# Patient Record
Sex: FEMALE | Race: WHITE | NOT HISPANIC OR LATINO | Employment: OTHER | URBAN - METROPOLITAN AREA
[De-identification: names, ages, dates, MRNs, and addresses within clinical notes are randomized per-mention and may not be internally consistent; named-entity substitution may affect disease eponyms.]

---

## 2019-06-17 ENCOUNTER — OFFICE VISIT (OUTPATIENT)
Dept: URGENT CARE | Facility: CLINIC | Age: 84
End: 2019-06-17
Payer: MEDICARE

## 2019-06-17 VITALS
OXYGEN SATURATION: 92 % | HEIGHT: 63 IN | TEMPERATURE: 98.2 F | RESPIRATION RATE: 18 BRPM | BODY MASS INDEX: 33.13 KG/M2 | DIASTOLIC BLOOD PRESSURE: 70 MMHG | SYSTOLIC BLOOD PRESSURE: 130 MMHG | HEART RATE: 72 BPM | WEIGHT: 187 LBS

## 2019-06-17 DIAGNOSIS — L23.7 ALLERGIC DERMATITIS DUE TO POISON IVY: Primary | ICD-10-CM

## 2019-06-17 PROCEDURE — G0463 HOSPITAL OUTPT CLINIC VISIT: HCPCS | Performed by: NURSE PRACTITIONER

## 2019-06-17 PROCEDURE — 99213 OFFICE O/P EST LOW 20 MIN: CPT | Performed by: NURSE PRACTITIONER

## 2019-06-17 RX ORDER — SIMVASTATIN 40 MG
TABLET ORAL
COMMUNITY
Start: 2019-05-01

## 2019-06-17 RX ORDER — CHLORAL HYDRATE 500 MG
1000 CAPSULE ORAL DAILY
COMMUNITY

## 2019-06-17 RX ORDER — MULTIVIT WITH MINERALS/LUTEIN
1000 TABLET ORAL DAILY
COMMUNITY

## 2019-06-17 RX ORDER — ACETAMINOPHEN,DIPHENHYDRAMINE HCL 500; 25 MG/1; MG/1
1 TABLET, FILM COATED ORAL
COMMUNITY

## 2019-06-17 RX ORDER — MULTIVITAMIN
1 TABLET ORAL DAILY
COMMUNITY

## 2019-06-17 RX ORDER — LISINOPRIL AND HYDROCHLOROTHIAZIDE 20; 12.5 MG/1; MG/1
TABLET ORAL
COMMUNITY
Start: 2019-06-11

## 2019-06-17 RX ORDER — PREDNISONE 10 MG/1
TABLET ORAL
Qty: 18 TABLET | Refills: 0 | Status: SHIPPED | OUTPATIENT
Start: 2019-06-17

## 2019-06-17 RX ORDER — SERTRALINE HYDROCHLORIDE 25 MG/1
TABLET, FILM COATED ORAL
COMMUNITY
Start: 2019-04-30

## 2019-06-17 RX ORDER — LANOLIN ALCOHOL/MO/W.PET/CERES
1 CREAM (GRAM) TOPICAL 2 TIMES DAILY
COMMUNITY

## 2021-03-15 ENCOUNTER — IMMUNIZATIONS (OUTPATIENT)
Dept: FAMILY MEDICINE CLINIC | Facility: HOSPITAL | Age: 86
End: 2021-03-15

## 2021-03-15 DIAGNOSIS — Z23 ENCOUNTER FOR IMMUNIZATION: Primary | ICD-10-CM

## 2021-03-15 PROCEDURE — 91300 SARS-COV-2 / COVID-19 MRNA VACCINE (PFIZER-BIONTECH) 30 MCG: CPT

## 2021-03-15 PROCEDURE — 0001A SARS-COV-2 / COVID-19 MRNA VACCINE (PFIZER-BIONTECH) 30 MCG: CPT

## 2021-04-08 ENCOUNTER — IMMUNIZATIONS (OUTPATIENT)
Dept: FAMILY MEDICINE CLINIC | Facility: HOSPITAL | Age: 86
End: 2021-04-08

## 2021-04-08 DIAGNOSIS — Z23 ENCOUNTER FOR IMMUNIZATION: Primary | ICD-10-CM

## 2021-04-08 PROCEDURE — 0002A SARS-COV-2 / COVID-19 MRNA VACCINE (PFIZER-BIONTECH) 30 MCG: CPT

## 2021-04-08 PROCEDURE — 91300 SARS-COV-2 / COVID-19 MRNA VACCINE (PFIZER-BIONTECH) 30 MCG: CPT

## 2024-01-01 ENCOUNTER — HOSPITAL ENCOUNTER (INPATIENT)
Facility: HOSPITAL | Age: 89
LOS: 7 days | Discharge: HOME HEALTH | DRG: 871 | End: 2024-01-08
Attending: EMERGENCY MEDICINE | Admitting: INTERNAL MEDICINE
Payer: MEDICARE

## 2024-01-01 ENCOUNTER — APPOINTMENT (EMERGENCY)
Dept: RADIOLOGY | Facility: HOSPITAL | Age: 89
DRG: 871 | End: 2024-01-01
Payer: MEDICARE

## 2024-01-01 ENCOUNTER — APPOINTMENT (INPATIENT)
Dept: CT IMAGING | Facility: HOSPITAL | Age: 89
DRG: 871 | End: 2024-01-01
Payer: MEDICARE

## 2024-01-01 DIAGNOSIS — J18.9 PNEUMONIA: Primary | ICD-10-CM

## 2024-01-01 DIAGNOSIS — J96.01 ACUTE HYPOXIC RESPIRATORY FAILURE (HCC): ICD-10-CM

## 2024-01-01 DIAGNOSIS — R09.02 HYPOXIA: ICD-10-CM

## 2024-01-01 DIAGNOSIS — J96.90 RESPIRATORY FAILURE (HCC): ICD-10-CM

## 2024-01-01 PROBLEM — R79.89 ELEVATED D-DIMER: Status: ACTIVE | Noted: 2024-01-01

## 2024-01-01 PROBLEM — A41.9 SEPSIS DUE TO PNEUMONIA (HCC): Status: ACTIVE | Noted: 2024-01-01

## 2024-01-01 PROBLEM — E87.1 HYPONATREMIA: Status: ACTIVE | Noted: 2024-01-01

## 2024-01-01 LAB
ALBUMIN SERPL BCP-MCNC: 4 G/DL (ref 3.5–5)
ALP SERPL-CCNC: 61 U/L (ref 34–104)
ALT SERPL W P-5'-P-CCNC: 23 U/L (ref 7–52)
ANION GAP SERPL CALCULATED.3IONS-SCNC: 9 MMOL/L
APTT PPP: 29 SECONDS (ref 23–37)
AST SERPL W P-5'-P-CCNC: 29 U/L (ref 13–39)
BASE EX.OXY STD BLDV CALC-SCNC: 93.9 % (ref 60–80)
BASE EXCESS BLDV CALC-SCNC: 2.4 MMOL/L
BASOPHILS # BLD AUTO: 0.04 THOUSANDS/ÂΜL (ref 0–0.1)
BASOPHILS NFR BLD AUTO: 0 % (ref 0–1)
BILIRUB SERPL-MCNC: 0.57 MG/DL (ref 0.2–1)
BUN SERPL-MCNC: 24 MG/DL (ref 5–25)
CALCIUM SERPL-MCNC: 9.2 MG/DL (ref 8.4–10.2)
CHLORIDE SERPL-SCNC: 85 MMOL/L (ref 96–108)
CO2 SERPL-SCNC: 27 MMOL/L (ref 21–32)
CREAT SERPL-MCNC: 1.11 MG/DL (ref 0.6–1.3)
D DIMER PPP FEU-MCNC: 2.98 UG/ML FEU
EOSINOPHIL # BLD AUTO: 0.03 THOUSAND/ÂΜL (ref 0–0.61)
EOSINOPHIL NFR BLD AUTO: 0 % (ref 0–6)
ERYTHROCYTE [DISTWIDTH] IN BLOOD BY AUTOMATED COUNT: 13.1 % (ref 11.6–15.1)
FLUAV RNA RESP QL NAA+PROBE: NEGATIVE
FLUBV RNA RESP QL NAA+PROBE: NEGATIVE
GFR SERPL CREATININE-BSD FRML MDRD: 43 ML/MIN/1.73SQ M
GLUCOSE SERPL-MCNC: 152 MG/DL (ref 65–140)
GLUCOSE SERPL-MCNC: 261 MG/DL (ref 65–140)
HCO3 BLDV-SCNC: 27.3 MMOL/L (ref 24–30)
HCT VFR BLD AUTO: 41.6 % (ref 34.8–46.1)
HGB BLD-MCNC: 13.7 G/DL (ref 11.5–15.4)
IMM GRANULOCYTES # BLD AUTO: 0.06 THOUSAND/UL (ref 0–0.2)
IMM GRANULOCYTES NFR BLD AUTO: 0 % (ref 0–2)
INR PPP: 1.06 (ref 0.84–1.19)
LACTATE SERPL-SCNC: 1.8 MMOL/L (ref 0.5–2)
LYMPHOCYTES # BLD AUTO: 1.22 THOUSANDS/ÂΜL (ref 0.6–4.47)
LYMPHOCYTES NFR BLD AUTO: 7 % (ref 14–44)
MAGNESIUM SERPL-MCNC: 1.8 MG/DL (ref 1.9–2.7)
MCH RBC QN AUTO: 30.3 PG (ref 26.8–34.3)
MCHC RBC AUTO-ENTMCNC: 32.9 G/DL (ref 31.4–37.4)
MCV RBC AUTO: 92 FL (ref 82–98)
MONOCYTES # BLD AUTO: 1.18 THOUSAND/ÂΜL (ref 0.17–1.22)
MONOCYTES NFR BLD AUTO: 7 % (ref 4–12)
NEUTROPHILS # BLD AUTO: 14.5 THOUSANDS/ÂΜL (ref 1.85–7.62)
NEUTS SEG NFR BLD AUTO: 86 % (ref 43–75)
NRBC BLD AUTO-RTO: 0 /100 WBCS
O2 CT BLDV-SCNC: 19.3 ML/DL
PCO2 BLDV: 43.5 MM HG (ref 42–50)
PH BLDV: 7.42 [PH] (ref 7.3–7.4)
PLATELET # BLD AUTO: 258 THOUSANDS/UL (ref 149–390)
PMV BLD AUTO: 9.3 FL (ref 8.9–12.7)
PO2 BLDV: 79.4 MM HG (ref 35–45)
POTASSIUM SERPL-SCNC: 3.7 MMOL/L (ref 3.5–5.3)
PROCALCITONIN SERPL-MCNC: 0.11 NG/ML
PROT SERPL-MCNC: 7.5 G/DL (ref 6.4–8.4)
PROTHROMBIN TIME: 13.9 SECONDS (ref 11.6–14.5)
RBC # BLD AUTO: 4.52 MILLION/UL (ref 3.81–5.12)
RSV RNA RESP QL NAA+PROBE: NEGATIVE
SARS-COV-2 RNA RESP QL NAA+PROBE: NEGATIVE
SODIUM SERPL-SCNC: 121 MMOL/L (ref 135–147)
WBC # BLD AUTO: 17.03 THOUSAND/UL (ref 4.31–10.16)

## 2024-01-01 PROCEDURE — 94762 N-INVAS EAR/PLS OXIMTRY CONT: CPT

## 2024-01-01 PROCEDURE — 71045 X-RAY EXAM CHEST 1 VIEW: CPT

## 2024-01-01 PROCEDURE — 80053 COMPREHEN METABOLIC PANEL: CPT | Performed by: EMERGENCY MEDICINE

## 2024-01-01 PROCEDURE — 87040 BLOOD CULTURE FOR BACTERIA: CPT | Performed by: EMERGENCY MEDICINE

## 2024-01-01 PROCEDURE — 5A1945Z RESPIRATORY VENTILATION, 24-96 CONSECUTIVE HOURS: ICD-10-PCS | Performed by: STUDENT IN AN ORGANIZED HEALTH CARE EDUCATION/TRAINING PROGRAM

## 2024-01-01 PROCEDURE — 71275 CT ANGIOGRAPHY CHEST: CPT

## 2024-01-01 PROCEDURE — 83735 ASSAY OF MAGNESIUM: CPT | Performed by: EMERGENCY MEDICINE

## 2024-01-01 PROCEDURE — 83036 HEMOGLOBIN GLYCOSYLATED A1C: CPT | Performed by: PHYSICIAN ASSISTANT

## 2024-01-01 PROCEDURE — 94664 DEMO&/EVAL PT USE INHALER: CPT

## 2024-01-01 PROCEDURE — 96365 THER/PROPH/DIAG IV INF INIT: CPT

## 2024-01-01 PROCEDURE — 96375 TX/PRO/DX INJ NEW DRUG ADDON: CPT

## 2024-01-01 PROCEDURE — 82948 REAGENT STRIP/BLOOD GLUCOSE: CPT

## 2024-01-01 PROCEDURE — 83605 ASSAY OF LACTIC ACID: CPT | Performed by: EMERGENCY MEDICINE

## 2024-01-01 PROCEDURE — 99291 CRITICAL CARE FIRST HOUR: CPT | Performed by: EMERGENCY MEDICINE

## 2024-01-01 PROCEDURE — 94760 N-INVAS EAR/PLS OXIMETRY 1: CPT

## 2024-01-01 PROCEDURE — 85610 PROTHROMBIN TIME: CPT | Performed by: EMERGENCY MEDICINE

## 2024-01-01 PROCEDURE — 36415 COLL VENOUS BLD VENIPUNCTURE: CPT | Performed by: EMERGENCY MEDICINE

## 2024-01-01 PROCEDURE — 0241U HB NFCT DS VIR RESP RNA 4 TRGT: CPT | Performed by: EMERGENCY MEDICINE

## 2024-01-01 PROCEDURE — 94644 CONT INHLJ TX 1ST HOUR: CPT

## 2024-01-01 PROCEDURE — 82805 BLOOD GASES W/O2 SATURATION: CPT | Performed by: EMERGENCY MEDICINE

## 2024-01-01 PROCEDURE — 99291 CRITICAL CARE FIRST HOUR: CPT

## 2024-01-01 PROCEDURE — 0BH17EZ INSERTION OF ENDOTRACHEAL AIRWAY INTO TRACHEA, VIA NATURAL OR ARTIFICIAL OPENING: ICD-10-PCS | Performed by: STUDENT IN AN ORGANIZED HEALTH CARE EDUCATION/TRAINING PROGRAM

## 2024-01-01 PROCEDURE — 84145 PROCALCITONIN (PCT): CPT | Performed by: EMERGENCY MEDICINE

## 2024-01-01 PROCEDURE — 99223 1ST HOSP IP/OBS HIGH 75: CPT | Performed by: PHYSICIAN ASSISTANT

## 2024-01-01 PROCEDURE — 85025 COMPLETE CBC W/AUTO DIFF WBC: CPT | Performed by: EMERGENCY MEDICINE

## 2024-01-01 PROCEDURE — 96367 TX/PROPH/DG ADDL SEQ IV INF: CPT

## 2024-01-01 PROCEDURE — NC001 PR NO CHARGE: Performed by: NURSE PRACTITIONER

## 2024-01-01 PROCEDURE — 94640 AIRWAY INHALATION TREATMENT: CPT

## 2024-01-01 PROCEDURE — 85730 THROMBOPLASTIN TIME PARTIAL: CPT | Performed by: EMERGENCY MEDICINE

## 2024-01-01 PROCEDURE — G1004 CDSM NDSC: HCPCS

## 2024-01-01 PROCEDURE — 85379 FIBRIN DEGRADATION QUANT: CPT | Performed by: EMERGENCY MEDICINE

## 2024-01-01 RX ORDER — INSULIN LISPRO 100 [IU]/ML
1-6 INJECTION, SOLUTION INTRAVENOUS; SUBCUTANEOUS
Status: DISCONTINUED | OUTPATIENT
Start: 2024-01-01 | End: 2024-01-02

## 2024-01-01 RX ORDER — LEVALBUTEROL INHALATION SOLUTION 1.25 MG/3ML
1.25 SOLUTION RESPIRATORY (INHALATION)
Status: DISCONTINUED | OUTPATIENT
Start: 2024-01-01 | End: 2024-01-08 | Stop reason: HOSPADM

## 2024-01-01 RX ORDER — HEPARIN SODIUM 5000 [USP'U]/ML
5000 INJECTION, SOLUTION INTRAVENOUS; SUBCUTANEOUS EVERY 8 HOURS SCHEDULED
Status: DISCONTINUED | OUTPATIENT
Start: 2024-01-01 | End: 2024-01-08 | Stop reason: HOSPADM

## 2024-01-01 RX ORDER — CEFTRIAXONE 1 G/50ML
1000 INJECTION, SOLUTION INTRAVENOUS EVERY 24 HOURS
Status: DISCONTINUED | OUTPATIENT
Start: 2024-01-02 | End: 2024-01-02

## 2024-01-01 RX ORDER — SODIUM CHLORIDE FOR INHALATION 0.9 %
12 VIAL, NEBULIZER (ML) INHALATION ONCE
Status: COMPLETED | OUTPATIENT
Start: 2024-01-01 | End: 2024-01-01

## 2024-01-01 RX ORDER — METHYLPREDNISOLONE SODIUM SUCCINATE 125 MG/2ML
125 INJECTION, POWDER, LYOPHILIZED, FOR SOLUTION INTRAMUSCULAR; INTRAVENOUS ONCE
Status: COMPLETED | OUTPATIENT
Start: 2024-01-01 | End: 2024-01-01

## 2024-01-01 RX ORDER — PRAVASTATIN SODIUM 40 MG
80 TABLET ORAL
Status: DISCONTINUED | OUTPATIENT
Start: 2024-01-01 | End: 2024-01-02

## 2024-01-01 RX ORDER — METHYLPREDNISOLONE SODIUM SUCCINATE 40 MG/ML
40 INJECTION, POWDER, LYOPHILIZED, FOR SOLUTION INTRAMUSCULAR; INTRAVENOUS EVERY 8 HOURS SCHEDULED
Status: DISCONTINUED | OUTPATIENT
Start: 2024-01-01 | End: 2024-01-02

## 2024-01-01 RX ORDER — ONDANSETRON 2 MG/ML
4 INJECTION INTRAMUSCULAR; INTRAVENOUS EVERY 6 HOURS PRN
Status: DISCONTINUED | OUTPATIENT
Start: 2024-01-01 | End: 2024-01-08 | Stop reason: HOSPADM

## 2024-01-01 RX ORDER — GUAIFENESIN/DEXTROMETHORPHAN 100-10MG/5
10 SYRUP ORAL EVERY 4 HOURS PRN
Status: DISCONTINUED | OUTPATIENT
Start: 2024-01-01 | End: 2024-01-07

## 2024-01-01 RX ORDER — SODIUM CHLORIDE 9 MG/ML
150 INJECTION, SOLUTION INTRAVENOUS CONTINUOUS
Status: DISCONTINUED | OUTPATIENT
Start: 2024-01-01 | End: 2024-01-02

## 2024-01-01 RX ORDER — HYDROCHLOROTHIAZIDE 12.5 MG/1
12.5 TABLET ORAL DAILY
Status: DISCONTINUED | OUTPATIENT
Start: 2024-01-02 | End: 2024-01-02

## 2024-01-01 RX ORDER — LISINOPRIL 20 MG/1
20 TABLET ORAL DAILY
Status: DISCONTINUED | OUTPATIENT
Start: 2024-01-02 | End: 2024-01-02

## 2024-01-01 RX ORDER — CHLORAL HYDRATE 500 MG
1000 CAPSULE ORAL DAILY
Status: DISCONTINUED | OUTPATIENT
Start: 2024-01-02 | End: 2024-01-02

## 2024-01-01 RX ORDER — INSULIN LISPRO 100 [IU]/ML
1-6 INJECTION, SOLUTION INTRAVENOUS; SUBCUTANEOUS
Status: DISCONTINUED | OUTPATIENT
Start: 2024-01-02 | End: 2024-01-02

## 2024-01-01 RX ORDER — ACETAMINOPHEN 325 MG/1
650 TABLET ORAL EVERY 6 HOURS PRN
Status: DISCONTINUED | OUTPATIENT
Start: 2024-01-01 | End: 2024-01-08 | Stop reason: HOSPADM

## 2024-01-01 RX ORDER — GUAIFENESIN 600 MG/1
600 TABLET, EXTENDED RELEASE ORAL EVERY 12 HOURS SCHEDULED
Status: DISCONTINUED | OUTPATIENT
Start: 2024-01-01 | End: 2024-01-02

## 2024-01-01 RX ORDER — CEFTRIAXONE 1 G/50ML
1000 INJECTION, SOLUTION INTRAVENOUS ONCE
Status: COMPLETED | OUTPATIENT
Start: 2024-01-01 | End: 2024-01-01

## 2024-01-01 RX ADMIN — METHYLPREDNISOLONE SODIUM SUCCINATE 40 MG: 40 INJECTION, POWDER, FOR SOLUTION INTRAMUSCULAR; INTRAVENOUS at 22:27

## 2024-01-01 RX ADMIN — GUAIFENESIN 600 MG: 600 TABLET ORAL at 22:51

## 2024-01-01 RX ADMIN — HEPARIN SODIUM 5000 UNITS: 5000 INJECTION INTRAVENOUS; SUBCUTANEOUS at 22:13

## 2024-01-01 RX ADMIN — IOHEXOL 85 ML: 350 INJECTION, SOLUTION INTRAVENOUS at 22:01

## 2024-01-01 RX ADMIN — LEVALBUTEROL HYDROCHLORIDE 1.25 MG: 1.25 SOLUTION RESPIRATORY (INHALATION) at 22:55

## 2024-01-01 RX ADMIN — IPRATROPIUM BROMIDE 1 MG: 0.5 SOLUTION RESPIRATORY (INHALATION) at 16:45

## 2024-01-01 RX ADMIN — GUAIFENESIN AND DEXTROMETHORPHAN 10 ML: 100; 10 SYRUP ORAL at 22:37

## 2024-01-01 RX ADMIN — SODIUM CHLORIDE 150 ML/HR: 0.9 INJECTION, SOLUTION INTRAVENOUS at 20:46

## 2024-01-01 RX ADMIN — IPRATROPIUM BROMIDE 0.5 MG: 0.5 SOLUTION RESPIRATORY (INHALATION) at 22:55

## 2024-01-01 RX ADMIN — AZITHROMYCIN MONOHYDRATE 500 MG: 500 INJECTION, POWDER, LYOPHILIZED, FOR SOLUTION INTRAVENOUS at 17:45

## 2024-01-01 RX ADMIN — INSULIN LISPRO 3 UNITS: 100 INJECTION, SOLUTION INTRAVENOUS; SUBCUTANEOUS at 22:27

## 2024-01-01 RX ADMIN — CEFTRIAXONE 1000 MG: 1 INJECTION, SOLUTION INTRAVENOUS at 17:20

## 2024-01-01 RX ADMIN — Medication 12 ML: at 16:45

## 2024-01-01 RX ADMIN — METHYLPREDNISOLONE SODIUM SUCCINATE 125 MG: 125 INJECTION, POWDER, FOR SOLUTION INTRAMUSCULAR; INTRAVENOUS at 16:42

## 2024-01-01 RX ADMIN — ALBUTEROL SULFATE 10 MG: 2.5 SOLUTION RESPIRATORY (INHALATION) at 16:45

## 2024-01-01 RX ADMIN — SODIUM CHLORIDE 1000 ML: 0.9 INJECTION, SOLUTION INTRAVENOUS at 17:12

## 2024-01-01 NOTE — ED PROVIDER NOTES
"History  Chief Complaint   Patient presents with    Shortness of Breath     Pt reports sob since tuesday 89F with longstanding smoking history presents with cough, wheezing over the last several days however getting worse.  Nonproductive however wheezing and harsh sounding.  History also obtained by daughter.  Patient hypoxic on room air, placed on nasal cannula.  She states she has been smoking \"a long time\" presently 8 cigarettes a day however denies any history of COPD or asthma.      Shortness of Breath      Prior to Admission Medications   Prescriptions Last Dose Informant Patient Reported? Taking?   Multiple Vitamin (MULTIVITAMIN) tablet 1/1/2024  Yes Yes   Sig: Take 1 tablet by mouth daily   Omega-3 Fatty Acids (FISH OIL) 1,000 mg 1/1/2024 Self Yes Yes   Sig: Take 1,000 mg by mouth daily   calcium citrate-vitamin D (CITRACAL+D) 315-200 MG-UNIT per tablet 1/1/2024  Yes Yes   Sig: Take 1 tablet by mouth 2 (two) times a day   diphenhydrAMINE-acetaminophen (TYLENOL PM)  MG TABS  Self Yes No   Sig: Take 1 tablet by mouth daily at bedtime as needed for sleep   lisinopril-hydrochlorothiazide (PRINZIDE,ZESTORETIC) 20-12.5 MG per tablet 1/1/2024  Yes Yes   predniSONE 10 mg tablet Not Taking  No No   Sig: 3 tabs daily for 3 days, 2 tabs daily for 3 days, 1 tab daily for 3 days   Patient not taking: Reported on 1/1/2024   sertraline (ZOLOFT) 25 mg tablet Not Taking  Yes No   Patient not taking: Reported on 1/1/2024   simvastatin (ZOCOR) 40 mg tablet 1/1/2024  Yes Yes   vitamin E, tocopherol, 1,000 units capsule 1/1/2024  Yes Yes   Sig: Take 1,000 Units by mouth daily      Facility-Administered Medications: None       Past Medical History:   Diagnosis Date    Hyperlipidemia     Hypertension        Past Surgical History:   Procedure Laterality Date    HYSTERECTOMY  1974       History reviewed. No pertinent family history.  I have reviewed and agree with the history as documented.    E-Cigarette/Vaping "     E-Cigarette/Vaping Substances     Social History     Tobacco Use    Smoking status: Every Day    Smokeless tobacco: Never   Substance Use Topics    Alcohol use: Not Currently    Drug use: Never       Review of Systems   Respiratory:  Positive for shortness of breath.        Physical Exam  Physical Exam  Vitals and nursing note reviewed.   Constitutional:       Appearance: Normal appearance. She is well-developed.   HENT:      Head: Normocephalic and atraumatic.   Eyes:      Conjunctiva/sclera: Conjunctivae normal.      Pupils: Pupils are equal, round, and reactive to light.   Neck:      Trachea: No tracheal deviation.   Cardiovascular:      Rate and Rhythm: Normal rate and regular rhythm.      Heart sounds: Normal heart sounds. No murmur heard.  Pulmonary:      Effort: Tachypnea and respiratory distress present.      Breath sounds: Wheezing and rhonchi present. No rales.   Abdominal:      General: Bowel sounds are normal. There is no distension.      Palpations: Abdomen is soft.      Tenderness: There is no abdominal tenderness.   Musculoskeletal:         General: No deformity.      Cervical back: Normal range of motion and neck supple.   Skin:     General: Skin is warm and dry.      Capillary Refill: Capillary refill takes less than 2 seconds.   Neurological:      General: No focal deficit present.      Mental Status: She is alert and oriented to person, place, and time.      Sensory: No sensory deficit.   Psychiatric:         Mood and Affect: Mood normal.         Judgment: Judgment normal.         Vital Signs  ED Triage Vitals   Temperature Pulse Respirations Blood Pressure SpO2   01/01/24 1601 01/01/24 1601 01/01/24 1601 01/01/24 1630 01/01/24 1601   98.8 °F (37.1 °C) 83 (!) 26 153/68 (!) 89 %      Temp Source Heart Rate Source Patient Position - Orthostatic VS BP Location FiO2 (%)   01/01/24 1601 01/01/24 1601 01/01/24 1630 01/01/24 1630 --   Temporal Monitor Sitting Left arm       Pain Score       01/01/24  1601       No Pain           Vitals:    01/01/24 2100 01/01/24 2212 01/01/24 2216 01/01/24 2245   BP: 122/72 (!) 175/118 (!) 167/131    Pulse: (!) 111 (!) 118 (!) 117 (!) 116   Patient Position - Orthostatic VS: Sitting            Visual Acuity      ED Medications  Medications   lisinopril (ZESTRIL) tablet 20 mg (has no administration in time range)     And   hydrochlorothiazide (HYDRODIURIL) tablet 12.5 mg (has no administration in time range)   multivitamin stress formula tablet 1 tablet (has no administration in time range)   fish oil capsule 1,000 mg (has no administration in time range)   pravastatin (PRAVACHOL) tablet 80 mg (0 mg Oral Hold 1/1/24 2045)   vitamin E (TOCOPHEROL) capsule 800 Units (has no administration in time range)   sodium chloride 0.9 % infusion (150 mL/hr Intravenous New Bag 1/1/24 2046)   acetaminophen (TYLENOL) tablet 650 mg (has no administration in time range)   ondansetron (ZOFRAN) injection 4 mg (has no administration in time range)   dextromethorphan-guaiFENesin (ROBITUSSIN DM) oral syrup 10 mL (10 mL Oral Given 1/1/24 2237)   heparin (porcine) subcutaneous injection 5,000 Units (5,000 Units Subcutaneous Given 1/1/24 2213)   insulin lispro (HumaLOG) 100 units/mL subcutaneous injection 1-6 Units (has no administration in time range)   insulin lispro (HumaLOG) 100 units/mL subcutaneous injection 1-6 Units (3 Units Subcutaneous Given 1/1/24 2227)   methylPREDNISolone sodium succinate (Solu-MEDROL) injection 40 mg (40 mg Intravenous Given 1/1/24 2227)   guaiFENesin (MUCINEX) 12 hr tablet 600 mg (600 mg Oral Given 1/1/24 2251)   levalbuterol (XOPENEX) inhalation solution 1.25 mg (1.25 mg Nebulization Given 1/1/24 2255)   ipratropium (ATROVENT) 0.02 % inhalation solution 0.5 mg (0.5 mg Nebulization Given 1/1/24 2255)   cefTRIAXone (ROCEPHIN) IVPB (premix in dextrose) 1,000 mg 50 mL (has no administration in time range)   albuterol inhalation solution 10 mg (10 mg Nebulization Given 1/1/24  1645)   ipratropium (ATROVENT) 0.02 % inhalation solution 1 mg (1 mg Nebulization Given 1/1/24 1645)   sodium chloride 0.9 % inhalation solution 12 mL (12 mL Nebulization Given 1/1/24 1645)   methylPREDNISolone sodium succinate (Solu-MEDROL) injection 125 mg (125 mg Intravenous Given 1/1/24 1642)   cefTRIAXone (ROCEPHIN) IVPB (premix in dextrose) 1,000 mg 50 mL (0 mg Intravenous Stopped 1/1/24 1750)   azithromycin (ZITHROMAX) 500 mg in sodium chloride 0.9 % 250 mL IVPB (0 mg Intravenous Stopped 1/1/24 1845)   sodium chloride 0.9 % bolus 1,000 mL (0 mL Intravenous Stopped 1/1/24 1912)   iohexol (OMNIPAQUE) 350 MG/ML injection (MULTI-DOSE) 85 mL (85 mL Intravenous Given 1/1/24 2201)       Diagnostic Studies  Results Reviewed       Procedure Component Value Units Date/Time    Hemoglobin A1c w/EAG Estimation (Orders if not completed within the last 90 days) [[id number]] Collected: 01/01/24 1643    Lab Status: In process Specimen: Blood from Arm, Right Updated: 01/01/24 2010    D-dimer, quantitative [[id number]]  (Abnormal) Collected: 01/01/24 1643    Lab Status: Final result Specimen: Blood from Arm, Right Updated: 01/01/24 1945     D-Dimer, Quant 2.98 ug/ml FEU     Narrative:      In the evaluation for possible pulmonary embolism, in the appropriate (Well's Score of 4 or less) patient, the age adjusted d-dimer cutoff for this patient can be calculated as:    Age x 0.01 (in ug/mL) for Age-adjusted D-dimer exclusion threshold for a patient over 50 years.    Legionella antigen, Urine [[id number]]     Lab Status: No result Specimen: Urine     Strep Pneumoniae, Urine [[id number]]     Lab Status: No result Specimen: Urine     Procalcitonin [[id number]]  (Normal) Collected: 01/01/24 1719    Lab Status: Final result Specimen: Blood from Arm, Right Updated: 01/01/24 1759     Procalcitonin 0.11 ng/ml     Lactic acid [[id number]]  (Normal) Collected: 01/01/24 1719    Lab Status: Final result Specimen: Blood from Arm, Right Updated:  01/01/24 1748     LACTIC ACID 1.8 mmol/L     Narrative:      Result may be elevated if tourniquet was used during collection.    Blood culture #1 [[id number]] Collected: 01/01/24 1719    Lab Status: In process Specimen: Blood from Arm, Right Updated: 01/01/24 1726    Blood culture #2 [[id number]] Collected: 01/01/24 1719    Lab Status: In process Specimen: Blood from Arm, Right Updated: 01/01/24 1726    Comprehensive metabolic panel [[id number]]  (Abnormal) Collected: 01/01/24 1643    Lab Status: Final result Specimen: Blood from Arm, Right Updated: 01/01/24 1713     Sodium 121 mmol/L      Potassium 3.7 mmol/L      Chloride 85 mmol/L      CO2 27 mmol/L      ANION GAP 9 mmol/L      BUN 24 mg/dL      Creatinine 1.11 mg/dL      Glucose 152 mg/dL      Calcium 9.2 mg/dL      AST 29 U/L      ALT 23 U/L      Alkaline Phosphatase 61 U/L      Total Protein 7.5 g/dL      Albumin 4.0 g/dL      Total Bilirubin 0.57 mg/dL      eGFR 43 ml/min/1.73sq m     Narrative:      National Kidney Disease Foundation guidelines for Chronic Kidney Disease (CKD):     Stage 1 with normal or high GFR (GFR > 90 mL/min/1.73 square meters)    Stage 2 Mild CKD (GFR = 60-89 mL/min/1.73 square meters)    Stage 3A Moderate CKD (GFR = 45-59 mL/min/1.73 square meters)    Stage 3B Moderate CKD (GFR = 30-44 mL/min/1.73 square meters)    Stage 4 Severe CKD (GFR = 15-29 mL/min/1.73 square meters)    Stage 5 End Stage CKD (GFR <15 mL/min/1.73 square meters)  Note: GFR calculation is accurate only with a steady state creatinine    Magnesium [[id number]]  (Abnormal) Collected: 01/01/24 1643    Lab Status: Final result Specimen: Blood from Arm, Right Updated: 01/01/24 1713     Magnesium 1.8 mg/dL     Protime-INR [[id number]]  (Normal) Collected: 01/01/24 1643    Lab Status: Final result Specimen: Blood from Arm, Right Updated: 01/01/24 1709     Protime 13.9 seconds      INR 1.06    APTT [[id number]]  (Normal) Collected: 01/01/24 1643    Lab Status: Final result  Specimen: Blood from Arm, Right Updated: 01/01/24 1709     PTT 29 seconds     FLU/RSV/COVID - if FLU/RSV clinically relevant [[id number]]  (Normal) Collected: 01/01/24 1625    Lab Status: Final result Specimen: Nares from Nose Updated: 01/01/24 1707     SARS-CoV-2 Negative     INFLUENZA A PCR Negative     INFLUENZA B PCR Negative     RSV PCR Negative    Narrative:      FOR PEDIATRIC PATIENTS - copy/paste COVID Guidelines URL to browser: [email or web address]    SARS-CoV-2 assay is a Nucleic Acid Amplification assay intended for the  qualitative detection of nucleic acid from SARS-CoV-2 in nasopharyngeal  swabs. Results are for the presumptive identification of SARS-CoV-2 RNA.    Positive results are indicative of infection with SARS-CoV-2, the virus  causing COVID-19, but do not rule out bacterial infection or co-infection  with other viruses. Laboratories within the [place] and its  territories are required to report all positive results to the appropriate  public health authorities. Negative results do not preclude SARS-CoV-2  infection and should not be used as the sole basis for treatment or other  patient management decisions. Negative results must be combined with  clinical observations, patient history, and epidemiological information.  This test has not been FDA cleared or approved.    This test has been authorized by FDA under an Emergency Use Authorization  (EUA). This test is only authorized for the duration of time the  declaration that circumstances exist justifying the authorization of the  emergency use of an in vitro diagnostic tests for detection of SARS-CoV-2  virus and/or diagnosis of COVID-19 infection under section 564(b)(1) of  the Act, 21 U.S.C. 360bbb-3(b)(1), unless the authorization is terminated  or revoked sooner. The test has been validated but independent review by FDA  and CLIA is pending.    Test performed using [employer]  GeneXpert: This RT-PCR assay targets N2,  a region unique to SARS-CoV-2. A conserved region in the E-gene was chosen  for pan-Sarbecovirus detection which includes SARS-CoV-2.    According to CMS-2020-01-R, this platform meets the definition of high-throughput technology.    Blood gas, venous [[id number]]  (Abnormal) Collected: 01/01/24 1643    Lab Status: Final result Specimen: Blood from Arm, Right Updated: 01/01/24 1656     pH, Damon 7.416     pCO2, Damon 43.5 mm Hg      pO2, Damon 79.4 mm Hg      HCO3, Damon 27.3 mmol/L      Base Excess, Damon 2.4 mmol/L      O2 Content, Damon 19.3 ml/dL      O2 HGB, VENOUS 93.9 %     UA w Reflex to Microscopic w Reflex to Culture [[id number]]     Lab Status: No result Specimen: Urine     CBC and differential [[id number]]  (Abnormal) Collected: 01/01/24 1643    Lab Status: Final result Specimen: Blood from Arm, Right Updated: 01/01/24 1652     WBC 17.03 Thousand/uL      RBC 4.52 Million/uL      Hemoglobin 13.7 g/dL      Hematocrit 41.6 %      MCV 92 fL      MCH 30.3 pg      MCHC 32.9 g/dL      RDW 13.1 %      MPV 9.3 fL      Platelets 258 Thousands/uL      nRBC 0 /100 WBCs      Neutrophils Relative 86 %      Immat GRANS % 0 %      Lymphocytes Relative 7 %      Monocytes Relative 7 %      Eosinophils Relative 0 %      Basophils Relative 0 %      Neutrophils Absolute 14.50 Thousands/µL      Immature Grans Absolute 0.06 Thousand/uL      Lymphocytes Absolute 1.22 Thousands/µL      Monocytes Absolute 1.18 Thousand/µL      Eosinophils Absolute 0.03 Thousand/µL      Basophils Absolute 0.04 Thousands/µL                    CTA ED chest PE Study   Final Result by Benito Blount DO (01/01 2313)      Some images are suboptimal secondary to respiratory motion which decreases sensitivity for evaluation of peripheral pulmonary emboli, subject to this, no central pulmonary embolism is seen.      Attenuation of multiple small airways in the right lower lobe which could be related to infectious or  inflammatory bronchitis, aspiration, and/or mucous plugging. Correlation with the patient's symptoms and laboratory values recommended      Coronary atherosclerosis, aortic atherosclerosis, saccular aneurysm of the abdominal aorta at the level of the kidneys measuring approximately 0.7 cm in size (axial image 262, series 2 and coronal image 105, series 601).      Other findings as above.      Workstation performed: [id number]         XR chest 1 view portable    (Results Pending)              Procedures  CriticalCare Time    Date/Time: 1/1/2024 4:49 PM    Performed by: Armani Rodriguez DO  Authorized by: Armani Rodriguez DO    Critical care provider statement:     Critical care time (minutes):  50    Critical care time was exclusive of:  Separately billable procedures and treating other patients and teaching time    Critical care was necessary to treat or prevent imminent or life-threatening deterioration of the following conditions:  Respiratory failure    Critical care was time spent personally by me on the following activities:  Examination of patient, evaluation of patient's response to treatment, development of treatment plan with patient or surrogate, obtaining history from patient or surrogate, review of old charts, re-evaluation of patient's condition, ordering and review of radiographic studies, ordering and review of laboratory studies and ordering and performing treatments and interventions  Comments:      Hypoxic respiratory failure requiring DONIS neb, cardiac telemetry, supplemental oxygen, ultimate admission to the hospital           ED Course  ED Course as of 01/01/24 2355   Mon Jan 01, 2024   1656 CXR viewed and interpreted independently by me, RLL infiltrate       Patient still symptomatic after treatment in the emergency department.  Will require admission to the hospital for IV antibiotics, nebulizers.  Will also send a D-dimer although very low likelihood that this is a  PE.                        SBIRT 20yo+      Flowsheet Row Most Recent Value   Initial Alcohol Screen: US AUDIT-C     1. How often do you have a drink containing alcohol? 0 Filed at: 01/01/2024 1606   2. How many drinks containing alcohol do you have on a typical day you are drinking?  0 Filed at: 01/01/2024 1606   3b. FEMALE Any Age, or MALE 65+: How often do you have 4 or more drinks on one occassion? 0 Filed at: 01/01/2024 1606   Audit-C Score 0 Filed at: 01/01/2024 1606   SMITH: How many times in the past year have you...    Used an illegal drug or used a prescription medication for non-medical reasons? Never Filed at: 01/01/2024 1606                      Medical Decision Making  89-year-old female presents with cough, wheezing on exam with tachypnea and mild respiratory distress including hypoxia.  Patient placed on nasal cannula.    She has no history of COPD however denies any smoking history, certainly likely a COPD exacerbation undiagnosed COPD.  Get a VBG to help confirm this test.  Will get a chest x-ray to look for pulmonary edema, effusion, pneumonia.  Doubt pneumonia clinically.  We have COVID flu RSV as well.  Will give IV steroids, telemetry, electrolytes to look for lab abnormalities including renal failure and electrolyte disturbances.  Patient will require close monitoring for her respiratory distress.    Patient has no chest pain abdominal pain or back pain to suggest PE or ACS or dissection/vascular cause.    Problems Addressed:  Hypoxia: acute illness or injury  Pneumonia: acute illness or injury  Respiratory failure (HCC): acute illness or injury    Amount and/or Complexity of Data Reviewed  Labs: ordered. Decision-making details documented in ED Course.  Radiology: ordered and independent interpretation performed. Decision-making details documented in ED Course.    Risk  Prescription drug management.  Drug therapy requiring intensive monitoring for toxicity.  Decision regarding  hospitalization.             Disposition  Final diagnoses:   Pneumonia   Hypoxia   Respiratory failure (HCC)     Time reflects when diagnosis was documented in both MDM as applicable and the Disposition within this note       Time User Action Codes Description Comment    1/1/2024  6:07 PM Armani Rodriguez [J18.9] Pneumonia     1/1/2024  6:07 PM Armani Rodriguez [R09.02] Hypoxia     1/1/2024  6:07 PM Armani Rodriguez [J96.90] Respiratory failure (HCC)           ED Disposition       ED Disposition   Admit    Condition   Stable    Date/Time   Mon Jan 1, 2024 1712    Comment   Case was discussed with yandel and the patient's admission status was agreed to be Admission Status: inpatient status to the service of Dr. humphries .               Follow-up Information    None         Current Discharge Medication List        CONTINUE these medications which have NOT CHANGED    Details   calcium citrate-vitamin D (CITRACAL+D) 315-200 MG-UNIT per tablet Take 1 tablet by mouth 2 (two) times a day      lisinopril-hydrochlorothiazide (PRINZIDE,ZESTORETIC) 20-12.5 MG per tablet       Multiple Vitamin (MULTIVITAMIN) tablet Take 1 tablet by mouth daily      Omega-3 Fatty Acids (FISH OIL) 1,000 mg Take 1,000 mg by mouth daily      simvastatin (ZOCOR) 40 mg tablet       vitamin E, tocopherol, 1,000 units capsule Take 1,000 Units by mouth daily      diphenhydrAMINE-acetaminophen (TYLENOL PM)  MG TABS Take 1 tablet by mouth daily at bedtime as needed for sleep      predniSONE 10 mg tablet 3 tabs daily for 3 days, 2 tabs daily for 3 days, 1 tab daily for 3 days  Qty: 18 tablet, Refills: 0    Associated Diagnoses: Allergic dermatitis due to poison ivy      sertraline (ZOLOFT) 25 mg tablet              No discharge procedures on file.    PDMP Review       None            ED Provider  Electronically Signed by             Armani Rodriguez DO  01/01/24 9980

## 2024-01-02 ENCOUNTER — APPOINTMENT (INPATIENT)
Dept: RADIOLOGY | Facility: HOSPITAL | Age: 89
DRG: 871 | End: 2024-01-02
Payer: MEDICARE

## 2024-01-02 PROBLEM — R65.20 SEVERE SEPSIS (HCC): Status: ACTIVE | Noted: 2024-01-01

## 2024-01-02 PROBLEM — J96.02 ACUTE RESPIRATORY FAILURE WITH HYPOXIA AND HYPERCAPNIA (HCC): Status: ACTIVE | Noted: 2024-01-01

## 2024-01-02 LAB
ALBUMIN SERPL BCP-MCNC: 3.6 G/DL (ref 3.5–5)
ALP SERPL-CCNC: 50 U/L (ref 34–104)
ALT SERPL W P-5'-P-CCNC: 49 U/L (ref 7–52)
ANION GAP SERPL CALCULATED.3IONS-SCNC: 11 MMOL/L
ANION GAP SERPL CALCULATED.3IONS-SCNC: 8 MMOL/L
ARTERIAL PATENCY WRIST A: YES
AST SERPL W P-5'-P-CCNC: 51 U/L (ref 13–39)
BACTERIA UR QL AUTO: ABNORMAL /HPF
BASE EX.OXY STD BLDV CALC-SCNC: 72.4 % (ref 60–80)
BASE EXCESS BLDA CALC-SCNC: -1.7 MMOL/L
BASE EXCESS BLDA CALC-SCNC: 0 MMOL/L (ref -2–3)
BASE EXCESS BLDV CALC-SCNC: -1.7 MMOL/L
BASOPHILS # BLD MANUAL: 0 THOUSAND/UL (ref 0–0.1)
BASOPHILS NFR MAR MANUAL: 0 % (ref 0–1)
BILIRUB DIRECT SERPL-MCNC: 0.11 MG/DL (ref 0–0.2)
BILIRUB SERPL-MCNC: 0.3 MG/DL (ref 0.2–1)
BILIRUB UR QL STRIP: NEGATIVE
BODY TEMPERATURE: 97.4 DEGREES FEHRENHEIT
BUN SERPL-MCNC: 23 MG/DL (ref 5–25)
BUN SERPL-MCNC: 26 MG/DL (ref 5–25)
CA-I BLD-SCNC: 1.11 MMOL/L (ref 1.12–1.32)
CA-I BLD-SCNC: 1.22 MMOL/L (ref 1.12–1.32)
CALCIUM SERPL-MCNC: 8.7 MG/DL (ref 8.4–10.2)
CALCIUM SERPL-MCNC: 8.9 MG/DL (ref 8.4–10.2)
CHLORIDE SERPL-SCNC: 87 MMOL/L (ref 96–108)
CHLORIDE SERPL-SCNC: 89 MMOL/L (ref 96–108)
CLARITY UR: CLEAR
CO2 SERPL-SCNC: 23 MMOL/L (ref 21–32)
CO2 SERPL-SCNC: 25 MMOL/L (ref 21–32)
COLOR UR: YELLOW
CREAT SERPL-MCNC: 1.05 MG/DL (ref 0.6–1.3)
CREAT SERPL-MCNC: 1.27 MG/DL (ref 0.6–1.3)
EOSINOPHIL # BLD MANUAL: 0 THOUSAND/UL (ref 0–0.4)
EOSINOPHIL NFR BLD MANUAL: 0 % (ref 0–6)
ERYTHROCYTE [DISTWIDTH] IN BLOOD BY AUTOMATED COUNT: 13.1 % (ref 11.6–15.1)
EST. AVERAGE GLUCOSE BLD GHB EST-MCNC: 148 MG/DL
FIO2 GAS DIL.REBREATH: 100 L
GFR SERPL CREATININE-BSD FRML MDRD: 36 ML/MIN/1.73SQ M
GFR SERPL CREATININE-BSD FRML MDRD: 46 ML/MIN/1.73SQ M
GLUCOSE SERPL-MCNC: 140 MG/DL (ref 65–140)
GLUCOSE SERPL-MCNC: 153 MG/DL (ref 65–140)
GLUCOSE SERPL-MCNC: 154 MG/DL (ref 65–140)
GLUCOSE SERPL-MCNC: 178 MG/DL (ref 65–140)
GLUCOSE SERPL-MCNC: 179 MG/DL (ref 65–140)
GLUCOSE SERPL-MCNC: 181 MG/DL (ref 65–140)
GLUCOSE UR STRIP-MCNC: ABNORMAL MG/DL
HBA1C MFR BLD: 6.8 %
HCO3 BLDA-SCNC: 23.3 MMOL/L (ref 22–28)
HCO3 BLDA-SCNC: 26.9 MMOL/L (ref 22–28)
HCO3 BLDV-SCNC: 25.2 MMOL/L (ref 24–30)
HCT VFR BLD AUTO: 37 % (ref 34.8–46.1)
HCT VFR BLD CALC: 33 % (ref 34.8–46.1)
HGB BLD-MCNC: 12.3 G/DL (ref 11.5–15.4)
HGB BLDA-MCNC: 11.2 G/DL (ref 11.5–15.4)
HGB UR QL STRIP.AUTO: ABNORMAL
HOROWITZ INDEX BLDA+IHG-RTO: 60 MM[HG]
IPAP: 15
KETONES UR STRIP-MCNC: NEGATIVE MG/DL
L PNEUMO1 AG UR QL IA.RAPID: NEGATIVE
LACTATE SERPL-SCNC: 0.9 MMOL/L (ref 0.5–2)
LEUKOCYTE ESTERASE UR QL STRIP: NEGATIVE
LYMPHOCYTES # BLD AUTO: 1.62 THOUSAND/UL (ref 0.6–4.47)
LYMPHOCYTES # BLD AUTO: 9 % (ref 14–44)
MAGNESIUM SERPL-MCNC: 1.8 MG/DL (ref 1.9–2.7)
MAGNESIUM SERPL-MCNC: 1.9 MG/DL (ref 1.9–2.7)
MCH RBC QN AUTO: 30.7 PG (ref 26.8–34.3)
MCHC RBC AUTO-ENTMCNC: 33.2 G/DL (ref 31.4–37.4)
MCV RBC AUTO: 92 FL (ref 82–98)
MONOCYTES # BLD AUTO: 0.54 THOUSAND/UL (ref 0–1.22)
MONOCYTES NFR BLD: 3 % (ref 4–12)
NEUTROPHILS # BLD MANUAL: 15.84 THOUSAND/UL (ref 1.85–7.62)
NEUTS BAND NFR BLD MANUAL: 5 % (ref 0–8)
NEUTS SEG NFR BLD AUTO: 83 % (ref 43–75)
NITRITE UR QL STRIP: NEGATIVE
NON VENT- BIPAP: ABNORMAL
NON-SQ EPI CELLS URNS QL MICRO: ABNORMAL /HPF
O2 CT BLDA-SCNC: 16.9 ML/DL (ref 16–23)
O2 CT BLDV-SCNC: 13.5 ML/DL
OSMOLALITY UR: 543 MMOL/KG
OXYHGB MFR BLDA: 95.7 % (ref 94–97)
PCO2 BLD: 29 MMOL/L (ref 21–32)
PCO2 BLD: 55 MM HG (ref 36–44)
PCO2 BLDA: 40.6 MM HG (ref 36–44)
PCO2 BLDV: 51.6 MM HG (ref 42–50)
PEEP MAX SETTING VENT: 8 CM[H2O]
PEEP RESPIRATORY: 6 CM[H2O]
PH BLD: 7.3 [PH] (ref 7.35–7.45)
PH BLDA: 7.38 [PH] (ref 7.35–7.45)
PH BLDV: 7.31 [PH] (ref 7.3–7.4)
PH UR STRIP.AUTO: 5.5 [PH]
PHOSPHATE SERPL-MCNC: 3.2 MG/DL (ref 2.3–4.1)
PHOSPHATE SERPL-MCNC: 3.7 MG/DL (ref 2.3–4.1)
PLATELET # BLD AUTO: 234 THOUSANDS/UL (ref 149–390)
PLATELET BLD QL SMEAR: ADEQUATE
PMV BLD AUTO: 9.2 FL (ref 8.9–12.7)
PO2 BLD: 249 MM HG (ref 75–129)
PO2 BLDA: 83.2 MM HG (ref 75–129)
PO2 BLDV: 39.9 MM HG (ref 35–45)
POTASSIUM BLD-SCNC: 4 MMOL/L (ref 3.5–5.3)
POTASSIUM SERPL-SCNC: 3.7 MMOL/L (ref 3.5–5.3)
POTASSIUM SERPL-SCNC: 4 MMOL/L (ref 3.5–5.3)
PROCALCITONIN SERPL-MCNC: 0.22 NG/ML
PROT SERPL-MCNC: 6.9 G/DL (ref 6.4–8.4)
PROT UR STRIP-MCNC: ABNORMAL MG/DL
RBC # BLD AUTO: 4.01 MILLION/UL (ref 3.81–5.12)
RBC #/AREA URNS AUTO: ABNORMAL /HPF
RBC MORPH BLD: NORMAL
S PNEUM AG UR QL: NEGATIVE
SAO2 % BLD FROM PO2: 100 % (ref 60–85)
SODIUM 24H UR-SCNC: 67 MOL/L
SODIUM BLD-SCNC: 122 MMOL/L (ref 136–145)
SODIUM SERPL-SCNC: 120 MMOL/L (ref 135–147)
SODIUM SERPL-SCNC: 123 MMOL/L (ref 135–147)
SP GR UR STRIP.AUTO: 1.01
SPECIMEN SOURCE: ABNORMAL
SPECIMEN SOURCE: NORMAL
UROBILINOGEN UR QL STRIP.AUTO: 0.2 E.U./DL
VENT AC: 18
VENT BIPAP FIO2: 70 %
VT SETTING VENT: 350 ML
WBC # BLD AUTO: 18 THOUSAND/UL (ref 4.31–10.16)
WBC #/AREA URNS AUTO: ABNORMAL /HPF

## 2024-01-02 PROCEDURE — 94640 AIRWAY INHALATION TREATMENT: CPT

## 2024-01-02 PROCEDURE — 31500 INSERT EMERGENCY AIRWAY: CPT | Performed by: STUDENT IN AN ORGANIZED HEALTH CARE EDUCATION/TRAINING PROGRAM

## 2024-01-02 PROCEDURE — 31500 INSERT EMERGENCY AIRWAY: CPT

## 2024-01-02 PROCEDURE — 82330 ASSAY OF CALCIUM: CPT

## 2024-01-02 PROCEDURE — 84295 ASSAY OF SERUM SODIUM: CPT

## 2024-01-02 PROCEDURE — 82805 BLOOD GASES W/O2 SATURATION: CPT | Performed by: NURSE PRACTITIONER

## 2024-01-02 PROCEDURE — 81001 URINALYSIS AUTO W/SCOPE: CPT | Performed by: EMERGENCY MEDICINE

## 2024-01-02 PROCEDURE — 82948 REAGENT STRIP/BLOOD GLUCOSE: CPT

## 2024-01-02 PROCEDURE — 83605 ASSAY OF LACTIC ACID: CPT | Performed by: NURSE PRACTITIONER

## 2024-01-02 PROCEDURE — 85014 HEMATOCRIT: CPT

## 2024-01-02 PROCEDURE — 36600 WITHDRAWAL OF ARTERIAL BLOOD: CPT

## 2024-01-02 PROCEDURE — 94002 VENT MGMT INPAT INIT DAY: CPT

## 2024-01-02 PROCEDURE — 85007 BL SMEAR W/DIFF WBC COUNT: CPT | Performed by: INTERNAL MEDICINE

## 2024-01-02 PROCEDURE — 83735 ASSAY OF MAGNESIUM: CPT | Performed by: NURSE PRACTITIONER

## 2024-01-02 PROCEDURE — 87205 SMEAR GRAM STAIN: CPT | Performed by: NURSE PRACTITIONER

## 2024-01-02 PROCEDURE — 82330 ASSAY OF CALCIUM: CPT | Performed by: NURSE PRACTITIONER

## 2024-01-02 PROCEDURE — 94664 DEMO&/EVAL PT USE INHALER: CPT

## 2024-01-02 PROCEDURE — 80048 BASIC METABOLIC PNL TOTAL CA: CPT | Performed by: PHYSICIAN ASSISTANT

## 2024-01-02 PROCEDURE — 80048 BASIC METABOLIC PNL TOTAL CA: CPT | Performed by: NURSE PRACTITIONER

## 2024-01-02 PROCEDURE — 94760 N-INVAS EAR/PLS OXIMETRY 1: CPT

## 2024-01-02 PROCEDURE — 80076 HEPATIC FUNCTION PANEL: CPT | Performed by: NURSE PRACTITIONER

## 2024-01-02 PROCEDURE — 83935 ASSAY OF URINE OSMOLALITY: CPT | Performed by: PHYSICIAN ASSISTANT

## 2024-01-02 PROCEDURE — 87449 NOS EACH ORGANISM AG IA: CPT | Performed by: PHYSICIAN ASSISTANT

## 2024-01-02 PROCEDURE — 85027 COMPLETE CBC AUTOMATED: CPT | Performed by: INTERNAL MEDICINE

## 2024-01-02 PROCEDURE — 82947 ASSAY GLUCOSE BLOOD QUANT: CPT

## 2024-01-02 PROCEDURE — 84132 ASSAY OF SERUM POTASSIUM: CPT

## 2024-01-02 PROCEDURE — 71045 X-RAY EXAM CHEST 1 VIEW: CPT

## 2024-01-02 PROCEDURE — 84300 ASSAY OF URINE SODIUM: CPT | Performed by: PHYSICIAN ASSISTANT

## 2024-01-02 PROCEDURE — 84100 ASSAY OF PHOSPHORUS: CPT | Performed by: NURSE PRACTITIONER

## 2024-01-02 PROCEDURE — 84145 PROCALCITONIN (PCT): CPT | Performed by: INTERNAL MEDICINE

## 2024-01-02 PROCEDURE — 82803 BLOOD GASES ANY COMBINATION: CPT

## 2024-01-02 PROCEDURE — 99233 SBSQ HOSP IP/OBS HIGH 50: CPT | Performed by: INTERNAL MEDICINE

## 2024-01-02 PROCEDURE — 87070 CULTURE OTHR SPECIMN AEROBIC: CPT | Performed by: NURSE PRACTITIONER

## 2024-01-02 PROCEDURE — 99223 1ST HOSP IP/OBS HIGH 75: CPT | Performed by: PHYSICIAN ASSISTANT

## 2024-01-02 PROCEDURE — 81003 URINALYSIS AUTO W/O SCOPE: CPT | Performed by: EMERGENCY MEDICINE

## 2024-01-02 RX ORDER — CEFTRIAXONE 2 G/50ML
2000 INJECTION, SOLUTION INTRAVENOUS EVERY 24 HOURS
Status: DISCONTINUED | OUTPATIENT
Start: 2024-01-02 | End: 2024-01-02

## 2024-01-02 RX ORDER — INSULIN LISPRO 100 [IU]/ML
1-6 INJECTION, SOLUTION INTRAVENOUS; SUBCUTANEOUS EVERY 6 HOURS SCHEDULED
Status: DISCONTINUED | OUTPATIENT
Start: 2024-01-02 | End: 2024-01-03

## 2024-01-02 RX ORDER — METHYLPREDNISOLONE SODIUM SUCCINATE 125 MG/2ML
60 INJECTION, POWDER, LYOPHILIZED, FOR SOLUTION INTRAMUSCULAR; INTRAVENOUS EVERY 6 HOURS SCHEDULED
Status: DISCONTINUED | OUTPATIENT
Start: 2024-01-02 | End: 2024-01-04

## 2024-01-02 RX ORDER — CEFEPIME HYDROCHLORIDE 2 G/50ML
2000 INJECTION, SOLUTION INTRAVENOUS EVERY 12 HOURS
Status: DISCONTINUED | OUTPATIENT
Start: 2024-01-02 | End: 2024-01-08 | Stop reason: HOSPADM

## 2024-01-02 RX ORDER — FENTANYL CITRATE 50 UG/ML
50 INJECTION, SOLUTION INTRAMUSCULAR; INTRAVENOUS
Status: DISCONTINUED | OUTPATIENT
Start: 2024-01-02 | End: 2024-01-04

## 2024-01-02 RX ORDER — FENTANYL CITRATE-0.9 % NACL/PF 10 MCG/ML
75 PLASTIC BAG, INJECTION (ML) INTRAVENOUS CONTINUOUS
Status: DISCONTINUED | OUTPATIENT
Start: 2024-01-02 | End: 2024-01-04

## 2024-01-02 RX ORDER — MIDAZOLAM HYDROCHLORIDE 2 MG/2ML
INJECTION, SOLUTION INTRAMUSCULAR; INTRAVENOUS
Status: DISPENSED
Start: 2024-01-02 | End: 2024-01-03

## 2024-01-02 RX ORDER — FENTANYL CITRATE 50 UG/ML
100 INJECTION, SOLUTION INTRAMUSCULAR; INTRAVENOUS ONCE
Status: DISCONTINUED | OUTPATIENT
Start: 2024-01-02 | End: 2024-01-03

## 2024-01-02 RX ORDER — SODIUM CHLORIDE FOR INHALATION 3 %
4 VIAL, NEBULIZER (ML) INHALATION
Status: DISCONTINUED | OUTPATIENT
Start: 2024-01-02 | End: 2024-01-05

## 2024-01-02 RX ORDER — ALBUMIN, HUMAN INJ 5% 5 %
SOLUTION INTRAVENOUS
Status: COMPLETED
Start: 2024-01-02 | End: 2024-01-02

## 2024-01-02 RX ORDER — CHLORHEXIDINE GLUCONATE ORAL RINSE 1.2 MG/ML
15 SOLUTION DENTAL EVERY 12 HOURS SCHEDULED
Status: DISCONTINUED | OUTPATIENT
Start: 2024-01-02 | End: 2024-01-06

## 2024-01-02 RX ORDER — BENZONATATE 100 MG/1
100 CAPSULE ORAL 3 TIMES DAILY PRN
Status: DISCONTINUED | OUTPATIENT
Start: 2024-01-02 | End: 2024-01-08

## 2024-01-02 RX ORDER — METHYLPREDNISOLONE SODIUM SUCCINATE 125 MG/2ML
INJECTION, POWDER, LYOPHILIZED, FOR SOLUTION INTRAMUSCULAR; INTRAVENOUS
Status: DISPENSED
Start: 2024-01-02 | End: 2024-01-02

## 2024-01-02 RX ORDER — ALBUMIN, HUMAN INJ 5% 5 %
12.5 SOLUTION INTRAVENOUS ONCE
Status: COMPLETED | OUTPATIENT
Start: 2024-01-02 | End: 2024-01-03

## 2024-01-02 RX ORDER — MIDAZOLAM HYDROCHLORIDE 2 MG/2ML
2 INJECTION, SOLUTION INTRAMUSCULAR; INTRAVENOUS ONCE
Status: DISCONTINUED | OUTPATIENT
Start: 2024-01-02 | End: 2024-01-03

## 2024-01-02 RX ORDER — PROPOFOL 10 MG/ML
5-50 INJECTION, EMULSION INTRAVENOUS
Status: DISCONTINUED | OUTPATIENT
Start: 2024-01-02 | End: 2024-01-04

## 2024-01-02 RX ADMIN — SODIUM CHLORIDE SOLN NEBU 3% 4 ML: 3 NEBU SOLN at 13:00

## 2024-01-02 RX ADMIN — CHLORHEXIDINE GLUCONATE, 0.12% ORAL RINSE 15 ML: 1.2 SOLUTION DENTAL at 21:20

## 2024-01-02 RX ADMIN — NOREPINEPHRINE BITARTRATE 3 MCG/MIN: 1 INJECTION, SOLUTION INTRAVENOUS at 15:53

## 2024-01-02 RX ADMIN — ALBUMIN (HUMAN) 12.5 G: 12.5 INJECTION, SOLUTION INTRAVENOUS at 14:59

## 2024-01-02 RX ADMIN — FENTANYL CITRATE 50 MCG: 50 INJECTION INTRAMUSCULAR; INTRAVENOUS at 14:10

## 2024-01-02 RX ADMIN — IPRATROPIUM BROMIDE 0.5 MG: 0.5 SOLUTION RESPIRATORY (INHALATION) at 19:52

## 2024-01-02 RX ADMIN — AZITHROMYCIN MONOHYDRATE 500 MG: 500 INJECTION, POWDER, LYOPHILIZED, FOR SOLUTION INTRAVENOUS at 10:00

## 2024-01-02 RX ADMIN — METHYLPREDNISOLONE SODIUM SUCCINATE 60 MG: 125 INJECTION, POWDER, FOR SOLUTION INTRAMUSCULAR; INTRAVENOUS at 13:01

## 2024-01-02 RX ADMIN — HEPARIN SODIUM 5000 UNITS: 5000 INJECTION INTRAVENOUS; SUBCUTANEOUS at 21:20

## 2024-01-02 RX ADMIN — CEFEPIME HYDROCHLORIDE 2000 MG: 2 INJECTION, SOLUTION INTRAVENOUS at 21:20

## 2024-01-02 RX ADMIN — LEVALBUTEROL HYDROCHLORIDE 1.25 MG: 1.25 SOLUTION RESPIRATORY (INHALATION) at 07:10

## 2024-01-02 RX ADMIN — HEPARIN SODIUM 5000 UNITS: 5000 INJECTION INTRAVENOUS; SUBCUTANEOUS at 13:13

## 2024-01-02 RX ADMIN — Medication 75 MCG/HR: at 14:08

## 2024-01-02 RX ADMIN — PROPOFOL 50 MCG/KG/MIN: 10 INJECTION, EMULSION INTRAVENOUS at 14:15

## 2024-01-02 RX ADMIN — CHLORHEXIDINE GLUCONATE, 0.12% ORAL RINSE 15 ML: 1.2 SOLUTION DENTAL at 13:01

## 2024-01-02 RX ADMIN — ALBUMIN, HUMAN INJ 5% 12.5 G: 5 SOLUTION at 14:59

## 2024-01-02 RX ADMIN — SODIUM CHLORIDE SOLN NEBU 3% 4 ML: 3 NEBU SOLN at 19:52

## 2024-01-02 RX ADMIN — METHYLPREDNISOLONE SODIUM SUCCINATE 60 MG: 125 INJECTION, POWDER, FOR SOLUTION INTRAMUSCULAR; INTRAVENOUS at 17:41

## 2024-01-02 RX ADMIN — INSULIN LISPRO 1 UNITS: 100 INJECTION, SOLUTION INTRAVENOUS; SUBCUTANEOUS at 19:20

## 2024-01-02 RX ADMIN — CEFEPIME HYDROCHLORIDE 2000 MG: 2 INJECTION, SOLUTION INTRAVENOUS at 09:42

## 2024-01-02 RX ADMIN — VANCOMYCIN HYDROCHLORIDE 1750 MG: 1 INJECTION, POWDER, LYOPHILIZED, FOR SOLUTION INTRAVENOUS at 17:41

## 2024-01-02 RX ADMIN — IPRATROPIUM BROMIDE 0.5 MG: 0.5 SOLUTION RESPIRATORY (INHALATION) at 07:10

## 2024-01-02 RX ADMIN — PROPOFOL 40 MCG/KG/MIN: 10 INJECTION, EMULSION INTRAVENOUS at 16:58

## 2024-01-02 RX ADMIN — PROPOFOL 40 MCG/KG/MIN: 10 INJECTION, EMULSION INTRAVENOUS at 21:31

## 2024-01-02 RX ADMIN — METHYLPREDNISOLONE SODIUM SUCCINATE 60 MG: 125 INJECTION, POWDER, FOR SOLUTION INTRAMUSCULAR; INTRAVENOUS at 23:59

## 2024-01-02 RX ADMIN — LEVALBUTEROL HYDROCHLORIDE 1.25 MG: 1.25 SOLUTION RESPIRATORY (INHALATION) at 13:00

## 2024-01-02 RX ADMIN — METHYLPREDNISOLONE SODIUM SUCCINATE 40 MG: 40 INJECTION, POWDER, FOR SOLUTION INTRAMUSCULAR; INTRAVENOUS at 05:19

## 2024-01-02 RX ADMIN — LEVALBUTEROL HYDROCHLORIDE 1.25 MG: 1.25 SOLUTION RESPIRATORY (INHALATION) at 19:52

## 2024-01-02 RX ADMIN — HEPARIN SODIUM 5000 UNITS: 5000 INJECTION INTRAVENOUS; SUBCUTANEOUS at 05:19

## 2024-01-02 RX ADMIN — IPRATROPIUM BROMIDE 0.5 MG: 0.5 SOLUTION RESPIRATORY (INHALATION) at 13:07

## 2024-01-02 NOTE — ASSESSMENT & PLAN NOTE
Sepsis parameters met with tachycardia, tachypnea, and leukocytosis   B Clx (1/1): Pending  Flu/RSV/COVID: Negative   Procal:   Imaging as above  Urine strep/legionella antigens pending  Increase Rocephin to 2G daily and add Azithromycin 500mg daily   Continue further supportive care

## 2024-01-02 NOTE — ASSESSMENT & PLAN NOTE
Lab Results   Component Value Date    HGBA1C 6.5 (H) 08/14/2023       Recent Labs     01/01/24 2207   POCGLU 261*       Blood Sugar Average: Last 72 hrs:  (P) 261    Placed on CCH type II diet  Obtain Accu-Cheks before meals and at bedtime with Humalog correction dose before meals and at bedtime

## 2024-01-02 NOTE — RESPIRATORY THERAPY NOTE
"RT Protocol Note  Dionne Zuluaga 89 y.o. female MRN: [id number]  Unit/Bed#: -01 Encounter: [id number]    Assessment    Principal Problem:    Sepsis due to pneumonia (HCC)  Active Problems:    Essential hypertension    Mixed hyperlipidemia    Tobacco use disorder    Type 2 diabetes mellitus with stage 3 chronic kidney disease, without long-term current use of insulin (HCC)    Acute hypoxic respiratory failure (HCC)    Hyponatremia    Elevated d-dimer      Home Pulmonary Medications:    Home Devices/Therapy: Other (Comment) (none)    Past Medical History:   Diagnosis Date    Hyperlipidemia     Hypertension      Social History     Socioeconomic History    Marital status:      Spouse name: None    Number of children: None    Years of education: None    Highest education level: None   Occupational History    None   Tobacco Use    Smoking status: Every Day    Smokeless tobacco: Never   Substance and Sexual Activity    Alcohol use: Not Currently    Drug use: Never    Sexual activity: Not Currently   Other Topics Concern    None   Social History Narrative    None     Social Determinants of Health     Financial Resource Strain: Not on file   Food Insecurity: Not on file   Transportation Needs: Not on file   Physical Activity: Not on file   Stress: Not on file   Social Connections: Not on file   Intimate Partner Violence: Not on file   Housing Stability: Not on file       Subjective         Objective    Physical Exam:   Assessment Type: Pre-treatment  General Appearance: Alert, Awake  Respiratory Pattern: Labored, Tachypneic  Chest Assessment: Chest expansion symmetrical  Bilateral Breath Sounds: Expiratory wheezes  Cough: None    Vitals:  Blood pressure (!) 167/131, pulse (!) (P) 116, temperature 99.5 °F (37.5 °C), resp. rate (!) 26, height 5' 3\" (1.6 m), weight 92 kg (202 lb 13.2 oz), SpO2 (P) 93%.          Imaging and other studies: I have personally reviewed pertinent reports.            Plan    Respiratory " Plan: Mild Distress pathway        Resp Comments: (P) called by nsg to assess pt, pt admitted for pnuemonia, hypoxia, wheezing, pt had heart neb in ed earlier, pt currently tachypnic on 8l/m midlfow, pt denies having any home resp meds or equipment, pt is a current smoker, pt has no other pulm hx, bs coarse wheezes, ordering pt on xop/atrovent and will give rx now, will continue to monitor

## 2024-01-02 NOTE — NUTRITION
01/02/24 1555   Biochemical Data,Medical Tests, and Procedures   Biochemical Data/Medical Tests/Procedures Lab values reviewed;Meds reviewed   Labs (Comment) 1/2/2024 magnesium 1.8, AST 51, hemoglobin A1c 6.8   Meds (Comment) Fentanyl, heparin, insulin, lisinopril, Versed, norepinephrine, propofol   Nutrition-Focused Physical Exam   Nutrition-Focused Physical Exam Findings RN skin assessment reviewed;No edema documented;No skin issues documented   Medical-Related Concerns acute respiratory failure with hypoxia and hypercapnia, hypertension, type 2 diabetes, stage III CKD, hyperlipidemia, tobacco use disorder   Adequacy of Intake   Nutrition Modality NPO;EN   Feeding Route   Formula Jevity 1.2   Formula rate Recently initiated, prescribed at 45 mL/h   Current PO Intake   Estimated calorie intake compared to estimated need Nutrient needs are not met acutely   Recommendations/Interventions   Malnutrition/BMI Present No   Summary Consult-tube feed.  Presents with shortness of breath.  Past medical history significant for acute respiratory failure with hypoxia and hypercapnia, hypertension, type 2 diabetes, stage III CKD, hyperlipidemia, tobacco use disorder.  Weight history reviewed.  Noted weight trending up over time.  No edema.  No pressure areas.  Was prescribed CCD 2 diet, thin liquids.  Intubated 1/2/2024 in setting of respiratory distress.  Newly prescribed Jevity 1.2 at 45 mL/h continuous.  50 mL water flush q6hr.  Recommend transitioning to Jevity 1.5 at 10 mL/hr continuous titrating up by 10 mL q4hr to goal of 30 mL/hr. Prosource Protein Liquid TID. Provides 1260 kcal, 91 g protein, 547 mL free water. Meets estimated kcal needs with propofol at current rate. Additional 1290 mL fluid needed via flushes/IV to meet low end estimated fluid needs.  RD to follow closely and make updated recommendations as hospital course progresses.   Interventions/Recommendations Tube feeding recs provided;Adjust EN/ PN;Monitor  I & O's   Recommendations to Provider Recommend transitioning to Jevity 1.5 at 10 mL/hr continuous titrating up by 10 mL q4hr to goal of 30 mL/hr. Prosource Protein Liquid TID. Provides 1260 kcal, 91 g protein, 547 mL free water. Meets estimated kcal needs with propofol at current rate. Additional 1290 mL fluid needed via flushes/IV to meet low end estimated fluid needs.   Education Assessment   Education Education not indicated at this time;Patient/caregiver not appropriate for education at this time   Patient Nutrition Goals   Goal Nutrition via appropriate route;Meet EN/PN needs

## 2024-01-02 NOTE — RESPIRATORY THERAPY NOTE
01/02/24 0920   Respiratory Assessment   Resp Comments Pt placed on bipap for inreased wob and poor O2 saturation. Pt izabel well at this time.   Non-Invasive Information   O2 Interface Device Full face mask   Non-Invasive Ventilation Mode BiPAP   $ Continous NIV Initial   SpO2 97 %   $ Pulse Oximetry Spot Check Charge Completed   Non-Invasive Settings   Max Pressure Set (cm H20) 30 cm H2O   IPAP (cm) 15 cm   EPAP (cm) 8 cm   FiO2 (%) 100   Trigger Sensitivity Flow (lpm) 5   Inspiratory Time (Set) 1.5   Expiratory Sensitivity (%) 25   Pressure Control Set (cm H20) 7   Non-Invasive Readings   Skin Intervention Skin intact   Total Rate 35   MV (Mech) 13.9   Peak Pressure (Obs) 17   Spontaneous Vt (mL) 498   I/E Ratio (Obs) 1:1.7   Leak (lpm) 11   Non-Invasive Alarms   Insp Pressure High (cm H20) 40   Insp Pressure Low (cm H20) 5   Low Insp Pressure Time (sec) 20 sec   MV High (L/min) 20   MV Low (L/min) 5   Vt High (mL) 1000   Vt Low (mL) 250   High Resp Rate (BPM) 40 BPM   Low Resp Rate (BPM) 8 BPM   Apnea Interval (sec) 20

## 2024-01-02 NOTE — ASSESSMENT & PLAN NOTE
Possibly in setting of decreased PO intake vs SIADH related to pneumonia vs outpatient HCTZ use  Currently NPO - holding on IVF  Continue to trend closely - plan for no more than 6-8 mEq change over 24H  Frequent neuro checks

## 2024-01-02 NOTE — ASSESSMENT & PLAN NOTE
Patient is somewhat hypertensive this morning.  This is in the setting of mild distress and coughing.  Will continue home lisinopril 20 mg daily  Home hydrochlorothiazide held at this time  Continue to monitor blood pressure trends and titrate medications as indicated

## 2024-01-02 NOTE — RESPIRATORY THERAPY NOTE
01/02/24 1342   Respiratory Assessment   Resp Comments Pt intubated for cont. sob on bipap for over 3hrs. Pt pre-oxygenated on 100% via bipap prior to intubation. Pt izabel well, sat 97% or greater during procedure. Good color change on Co2 monitor, bilat BS heard. Pt with a 7.5 tube at 22cm at the teeth, CXR pending.   Vent Information   Vent ID   [id number])   Vent type Hammond G5   Hammond C3/G5 Vent Mode (S)CMV   $ Vent Charge-INITIAL Yes   Ventilator Start Yes   $ Pulse Oximetry Spot Check Charge Completed   SpO2 98 %   (S)CMV Settings   Resp Rate (BPM) 14 BPM   VT (mL) 350 mL   FIO2 (%) 100 %   PEEP (cmH2O) 6 cmH2O   I:E Ratio 1:2.3   Insp Time (%) 1.3 %   Flow Trigger (LPM) 5   Humidification Heater   Pause Time (%) 35 %   (S)CMV Actuals   Resp Rate (BPM) 14 BPM   VT (mL) 365   MV 4.9   MAP (cmH2O) 1212 cmH2O   Peak Pressure (cmH2O) 24 cmH2O   I:E Ratio (Obs) 1:2.3   Insp Resistance 23   Heater Temperature (Obs) 95 °F (35 °C)   Static Compliance (mL/cmH20) 34.9 mL/cmH2O   (S)CMV Alarms   High Peak Pressure (cmH2O) 40   Low Pressure (cmH2O) 5 cm H2O   High Resp Rate (BPM) 40 BPM   Low Resp Rate (BPM) 8 BPM   High MV (L/min) 20 L/min   Low MV (L/min) 4 L/min   High VT (mL) 1000 mL   Low VT (mL) 250 mL   Apnea Time (s) 20 S   Maintenance   Alarm (pink) cable attached No   Resuscitation bag with peep valve at bedside Yes   Water bag changed No   Circuit changed No   IHI Ventilator Associated Pneumonia Bundle   Daily Assessment of Readiness to Extubate Yes   Head of Bed Elevated HOB 20

## 2024-01-02 NOTE — PROCEDURES
Intubation    Date/Time: 1/2/2024 1:48 PM    Performed by: TARUN Soni  Authorized by: TARUN Soni    Patient location:  Bedside  Consent:     Consent obtained:  Verbal    Consent given by:  Patient    Risks discussed:  Aspiration, brain injury, dental trauma, laryngeal injury, pneumothorax, hypoxia, death and bleeding  Universal protocol:     Procedure explained and questions answered to patient or proxy's satisfaction: yes      Site/side marked: yes      Immediately prior to procedure, a time out was called: yes      Patient identity confirmed:  Verbally with patient, hospital-assigned identification number and arm band  Pre-procedure details:     Patient status:  Awake    Pretreatment medications:  Etomidate    Paralytics:  Succinylcholine  Indications:     Indications for intubation: respiratory distress, respiratory failure and hypoxemia    Procedure details:     Preoxygenation:  Bag valve mask    Intubation method:  Oral    Oral intubation technique:  Direct and glidescope (Hoang)    Laryngoscope blade:  Mac 3    Tube size (mm):  7.5    Tube type:  Cuffed and hi-lo    Number of attempts:  1    Tube visualized through cords: yes    Placement assessment:     ETT to teeth:  22    Tube secured with:  ETT van    Breath sounds:  Equal and absent over the epigastrium    Placement verification: chest rise, condensation, colorimetric ETCO2 device, CXR verification, direct visualization, equal breath sounds, ETCO2 detector and tube exhalation      CXR findings:  ETT in proper place  Post-procedure details:     Patient tolerance of procedure:  Tolerated well, no immediate complications

## 2024-01-02 NOTE — SEPSIS NOTE
Sepsis Note   Dionne Zuluaga 89 y.o. female MRN: [id number]  Unit/Bed#: -01 Encounter: [id number]    Patient was noted to meet sepsis criteria in that she was tachycardic with a heart rate as high as 118, tachypneic with a respiratory rate as high as 26, leukocytosis of 17.03 with a known source of infection community-acquired pneumonia    Sepsis order set was initiated, patient received IV fluids, cultures are currently pending and sepsis protocol IV fluids began          Body mass index is 35.93 kg/m².  Wt Readings from Last 1 Encounters:   01/01/24 92 kg (202 lb 13.2 oz)     IBW (Ideal Body Weight): 52.4 kg    Ideal body weight: 52.4 kg (115 lb 8.3 oz)  Adjusted ideal body weight: 68.2 kg (150 lb 7.1 oz)

## 2024-01-02 NOTE — ASSESSMENT & PLAN NOTE
Met sepsis criteria on admission - now meeting severe sepsis given new need for BIPAP  In setting of pneumonia as noted above  Blood cultures pending  UA w/out evidence of infection  Urine strep/legionella negative  Will send sputum culture now if able to obtain  Lactic acid 0.9  Procalcitonin 0.11 - 0.22  Holding on IVF given tenuous respiratory status  Will expand antibiotics to IV Cefepime/Vancomycin/Azithromycin as noted above  Monitor fever and WBC curve  Trend procalcitonin and follow-up cutlures

## 2024-01-02 NOTE — ASSESSMENT & PLAN NOTE
Counseled patient on importance of smoking cessation  Offered nicotine replacement which she declined

## 2024-01-02 NOTE — ASSESSMENT & PLAN NOTE
Na: 120 (slight down trend from yesterday)  This could be multifactorial from HCTZ use in combination with SIADH due to PNA  Will hold HCTZ and IVF  Place on 2L fluid restriction  Trend BMP Q8H

## 2024-01-02 NOTE — PLAN OF CARE
Problem: Prexisting or High Potential for Compromised Skin Integrity  Goal: Skin integrity is maintained or improved  Description: INTERVENTIONS:  - Identify patients at risk for skin breakdown  - Assess and monitor skin integrity  - Assess and monitor nutrition and hydration status  - Monitor labs   - Assess for incontinence   - Turn and reposition patient  - Assist with mobility/ambulation  - Relieve pressure over bony prominences  - Avoid friction and shearing  - Provide appropriate hygiene as needed including keeping skin clean and dry  - Evaluate need for skin moisturizer/barrier cream  - Collaborate with interdisciplinary team   - Patient/family teaching  - Consider wound care consult   Outcome: Progressing     Problem: SAFETY,RESTRAINT: NV/NON-SELF DESTRUCTIVE BEHAVIOR  Goal: Remains free of harm/injury (restraint for non violent/non self-detsructive behavior)  Description: INTERVENTIONS:  - Instruct patient/family regarding restraint use   - Assess and monitor physiologic and psychological status   - Provide interventions and comfort measures to meet assessed patient needs   - Identify and implement measures to help patient regain control  - Assess readiness for release of restraint   Outcome: Progressing     Problem: SAFETY,RESTRAINT: NV/NON-SELF DESTRUCTIVE BEHAVIOR  Goal: Returns to optimal restraint-free functioning  Description: INTERVENTIONS:  - Assess the patient's behavior and symptoms that indicate continued need for restraint  - Identify and implement measures to help patient regain control  - Assess readiness for release of restraint   Outcome: Progressing

## 2024-01-02 NOTE — ASSESSMENT & PLAN NOTE
Likely multifactorial as patient likely has component of diagnosed underlying COPD  Patient is currently requiring mid flow O2 continue O2 saturation 92% and was hypoxic with an O2 sat as low as 86% on 6 L nasal cannula  Will give Solu-Medrol 40 mg IV every 8 hours  Placed on O2 and respiratory protocol  Explained to patient and her family importance of formal pulmonary consult after resolution of her acute pneumonia to establish possible diagnosis

## 2024-01-02 NOTE — PROGRESS NOTES
Deterioration Index Critical Care Recommendations  Room #:   Deterioration index score: 61.33%    Critical Care recommends No intervention    Spoke with AP from primary team    Brief summary:   Critical care was brought to the patient's bedside via deterioration index alert. The alert was concerning for age and respiratory rate.     Please contact critical care via [facility] with any questions or concerns.

## 2024-01-02 NOTE — PROGRESS NOTES
[facility]  Progress Note  Name: Dionne Zuluaga I  MRN: [id number]  Unit/Bed#: -01 I Date of Admission: 1/1/2024   Date of Service: 1/2/2024 I Hospital Day: 1    Assessment/Plan   * Acute hypoxic respiratory failure (HCC)  Assessment & Plan  Currently requiring 8L mid flow nasal cannula and does not require supplemental oxygen at baseline  CTA Chest (1/1): No central pulmonary embolism is seen. Attenuation of multiple small airways in the right lower lobe which could be related to infectious or inflammatory bronchitis, aspiration, and/or mucous plugging.  Continue Atrovent/Xopenex TID  Increase Solumedrol to 60mg Q6H  Wean oxygen as tolerated  Will upgrade the patient to SD2 level of care  Pulmonology consultation requested    Sepsis due to pneumonia ([facility])  Assessment & Plan  Sepsis parameters met with tachycardia, tachypnea, and leukocytosis   B Clx (1/1): Pending  Flu/RSV/COVID: Negative   Procal:   Imaging as above  Urine strep/legionella antigens pending  Increase Rocephin to 2G daily and add Azithromycin 500mg daily   Continue further supportive care    Elevated d-dimer  Assessment & Plan  D-dimer was 2.98  CTA was limited by motion artifact but no central PE was identified     Hyponatremia  Assessment & Plan  Na: 120 (slight down trend from yesterday)  This could be multifactorial from HCTZ use in combination with SIADH due to PNA  Will hold HCTZ and IVF  Place on 2L fluid restriction  Trend BMP Q8H    Type 2 diabetes mellitus with stage 3 chronic kidney disease, without long-term current use of insulin (HCC)  Assessment & Plan  Lab Results   Component Value Date    HGBA1C 6.5 (H) 08/14/2023       Recent Labs     01/01/24  2207 01/02/24  0710   POCGLU 261* 153*         Blood Sugar Average: Last 72 hrs:  (P) 207    Continue Accu-Cheks, corrective sliding scale insulin, and hypoglycemic protocol  Carb controlled diet    Essential hypertension  Assessment & Plan  Patient is somewhat  hypertensive this morning.  This is in the setting of mild distress and coughing.  Will continue home lisinopril 20 mg daily  Home hydrochlorothiazide held at this time  Continue to monitor blood pressure trends and titrate medications as indicated           VTE Pharmacologic Prophylaxis: VTE Score: 8 High Risk (Score >/= 5) - Pharmacological DVT Prophylaxis Ordered: heparin. Sequential Compression Devices Ordered.    Mobility:   Basic Mobility Inpatient Raw Score: 18  JH-HLM Goal: 6: Walk 10 steps or more  JH-HLM Achieved: 7: Walk 25 feet or more  HLM Goal achieved. Continue to encourage appropriate mobility.    Patient Centered Rounds: I performed bedside rounds with nursing staff today.   Discussions with Specialists or Other Care Team Provider: RT, Nursing, CM    Education and Discussions with Family / Patient: Updated  (daughter) at bedside.    Total Time Spent on Date of Encounter in care of patient: 45 mins. This time was spent on one or more of the following: performing physical exam; counseling and coordination of care; obtaining or reviewing history; documenting in the medical record; reviewing/ordering tests, medications or procedures; communicating with other healthcare professionals and discussing with patient's family/caregivers.    Current Length of Stay: 1 day(s)  Current Patient Status: Inpatient   Certification Statement: The patient will continue to require additional inpatient hospital stay due to acute hypoxic respiratory failure and sepsis due to pneumonia.  Discharge Plan:  TBD based on clinical improvement.    Code Status: Level 1 - Full Code    Subjective:   Patient is sitting up in bed. She noted that she was feeling somewhat better over night but when she awoke she felt her breathing was worse. She continues to endorse non-productive cough and was utilizes 8L mid flow (no home oxygen requirements at baseline). She noted very little improvement in her symptoms following  nebulizer treatments.     Objective:     Vitals:   Temp (24hrs), Av.8 °F (37.1 °C), Min:97.8 °F (36.6 °C), Max:99.5 °F (37.5 °C)    Temp:  [97.8 °F (36.6 °C)-99.5 °F (37.5 °C)] 97.8 °F (36.6 °C)  HR:  [] 93  Resp:  [18-26] 18  BP: (122-176)/() 176/95  SpO2:  [86 %-96 %] 90 %  Body mass index is 35.93 kg/m².     Input and Output Summary (last 24 hours):   No intake or output data in the 24 hours ending 24 0815    Physical Exam:   Physical Exam  Vitals and nursing note reviewed.   Constitutional:       General: She is in acute distress.      Appearance: She is well-developed. She is obese. She is ill-appearing.   HENT:      Head: Normocephalic and atraumatic.      Mouth/Throat:      Mouth: Mucous membranes are moist.      Pharynx: Oropharynx is clear.   Eyes:      Extraocular Movements: Extraocular movements intact.      Conjunctiva/sclera: Conjunctivae normal.   Cardiovascular:      Rate and Rhythm: Regular rhythm. Tachycardia present.      Pulses: Normal pulses.      Heart sounds: Normal heart sounds. No murmur heard.  Pulmonary:      Effort: Pulmonary effort is normal. No respiratory distress.      Breath sounds: Wheezing and rhonchi present.      Comments: 8L midflow  Abdominal:      General: Bowel sounds are normal. There is no distension.      Palpations: Abdomen is soft.      Tenderness: There is no abdominal tenderness.   Musculoskeletal:         General: No swelling. Normal range of motion.      Cervical back: Neck supple.   Skin:     General: Skin is warm and dry.      Capillary Refill: Capillary refill takes less than 2 seconds.   Neurological:      General: No focal deficit present.      Mental Status: She is alert and oriented to person, place, and time. Mental status is at baseline.          Labs:  Results from last 7 days   Lab Units 24  0238 24  1643   WBC Thousand/uL 18.00* 17.03*   HEMOGLOBIN g/dL 12.3 13.7   HEMATOCRIT % 37.0 41.6   PLATELETS Thousands/uL 234 258    BANDS PCT % 5  --    NEUTROS PCT %  --  86*   LYMPHS PCT %  --  7*   LYMPHO PCT % 9*  --    MONOS PCT %  --  7   MONO PCT % 3*  --    EOS PCT % 0 0     Results from last 7 days   Lab Units 01/02/24  0238 01/01/24  1643   SODIUM mmol/L 120* 121*   POTASSIUM mmol/L 3.7 3.7   CHLORIDE mmol/L 87* 85*   CO2 mmol/L 25 27   BUN mg/dL 23 24   CREATININE mg/dL 1.05 1.11   ANION GAP mmol/L 8 9   CALCIUM mg/dL 8.9 9.2   ALBUMIN g/dL  --  4.0   TOTAL BILIRUBIN mg/dL  --  0.57   ALK PHOS U/L  --  61   ALT U/L  --  23   AST U/L  --  29   GLUCOSE RANDOM mg/dL 178* 152*     Results from last 7 days   Lab Units 01/01/24  1643   INR  1.06     Results from last 7 days   Lab Units 01/02/24  0710 01/01/24  2207   POC GLUCOSE mg/dl 153* 261*         Results from last 7 days   Lab Units 01/02/24  0238 01/01/24  1719   LACTIC ACID mmol/L  --  1.8   PROCALCITONIN ng/ml 0.22 0.11       Lines/Drains:  Invasive Devices       Peripheral Intravenous Line  Duration             Peripheral IV 01/01/24 Dorsal (posterior);Right Hand <1 day    Peripheral IV 01/01/24 Proximal;Right;Ventral (anterior) Forearm <1 day                      Telemetry:  Telemetry Orders (From admission, onward)               24 Hour Telemetry Monitoring  Continuous x 24 Hours (Telem)        Question:  Reason for 24 Hour Telemetry  Answer:  Drug overdose known to cause cardiac arrhythmias (e.g. - Cocaine, TCA, Amphetamines, Phenothiazines, Digoxin, etc.) Meds known to need cardiac monitoring: Amiodarone, Dopamine, Dobutamine, Phenytoin                     Telemetry Reviewed: Sinus Tachycardia  Indication for Continued Telemetry Use: Metabolic/electrolyte disturbance with high probability of dysrhythmia    Imaging: Reviewed radiology reports from this admission including: chest CT scan    Recent Cultures (last 7 days):   Results from last 7 days   Lab Units 01/01/24  1719   BLOOD CULTURE  Received in Microbiology Lab. Culture in Progress.  Received in Microbiology Lab.  Culture in Progress.       Last 24 Hours Medication List:   Current Facility-Administered Medications   Medication Dose Route Frequency Provider Last Rate    acetaminophen  650 mg Oral Q6H PRN Lang Barry PA-C      azithromycin  500 mg Intravenous Q24H Anni Iniguez DO      benzonatate  100 mg Oral TID PRN Anni Iniguez DO      cefTRIAXone  2,000 mg Intravenous Q24H Anni Iniguez DO      dextromethorphan-guaiFENesin  10 mL Oral Q4H PRN Lang Barry PA-C      fish oil  1,000 mg Oral Daily Lang Barry PA-C      guaiFENesin  600 mg Oral Q12H RUTH Lang Barry PA-C      heparin (porcine)  5,000 Units Subcutaneous Q8H RUTH Lang Barry PA-C      insulin lispro  1-6 Units Subcutaneous TID AC Lang Barry PA-C      insulin lispro  1-6 Units Subcutaneous HS Lang Barry PA-C      ipratropium  0.5 mg Nebulization TID Anni Iniguez DO      levalbuterol  1.25 mg Nebulization TID Anni Iniguez DO      lisinopril  20 mg Oral Daily Lang Barry PA-C      methylPREDNISolone sodium succinate  60 mg Intravenous Q6H [facility] Anni Iniguez DO      multivitamin stress formula  1 tablet Oral Daily Lang Barry PA-C      ondansetron  4 mg Intravenous Q6H PRN Lang Barry PA-C      pravastatin  80 mg Oral Daily With Dinner Lang Barry PA-C      vitamin E  800 Units Oral Daily Lang Barry PA-C          Today, Patient Was Seen By: Anni Iniguez DO    **Please Note: This note may have been constructed using a voice recognition system.**

## 2024-01-02 NOTE — ASSESSMENT & PLAN NOTE
Admit to medicine  Blood cultures and urine cultures are currently pending  Obtain urine for strep pneumo and Legionella  Placed on O2 and respiratory protocol  Will give Rocephin 1 g IV daily  Will give Robitussin DM as needed and Mucinex 600 mg p.o. every 12 hours   Hydrate with normal saline 125 cc/h  Trend lactic acid and procalcitonin  Patient met sepsis criteria in that she was tachycardic with a heart rate as high as 118, tachypneic with a respiratory rate as high as 26, leukocytosis of 17.03 and a known source of infection being community-acquired pneumonia

## 2024-01-02 NOTE — ASSESSMENT & PLAN NOTE
Lab Results   Component Value Date    HGBA1C 6.8 (H) 01/01/2024       Recent Labs     01/01/24  2207 01/02/24  0710 01/02/24  1230 01/02/24  1743   POCGLU 261* 153* 140 179*       Blood Sugar Average: Last 72 hrs:  (P) 183.25    Does not appear to take antihyperglycemic agents as outpatient  Continue SSI w/fingersticks Q6H  Goal  - 180

## 2024-01-02 NOTE — CONSULTS
Consultation - Palliative and Supportive Care   Dionne Zuluaga 89 y.o. female [id number]  Assessment  Present on Admission:   Essential hypertension   Type 2 diabetes mellitus with stage 3 chronic kidney disease, without long-term current use of insulin (HCC)   Acute hypoxic respiratory failure (HCC)   Hyponatremia   Sepsis due to pneumonia (HCC)   Elevated d-dimer       Active issues addressed today:  Sepsis due to pneumonia  Acute hypoxic respiratory failure  CKD3  Palliative care consult  Goals of care counseling    Plan  Symptom management  Defer to primary treatment team    2.   Goals  Level 1 code status. Full code. Disease focused care.   Pt and family agree to trial of intubation - pt does not want to be kept alive indefinitely with mechanical ventilation  She does want to be resuscitated - Continue CODE 1 status - full code  She entrusts her daughters as her HCRs.   Has 5 wishes document at home that family will bring to hospital  Hospice/comfort care reviewed with patient and family        3.  Social support  Patient is supported by daughters, granddaughter  Supportive listening provided  Normalized experience of patient/family  Provided anxiety containment  Provided anticipatory guidance    4.  Follow up  Palliative Care will continue to follow for ongoing goals of care discussions.   Please reach out to on-call provider via [employer] if questions or concerns arise.                Decisional apparatus:  Patient is competent on exam today.  If competency is lost, patient's substitute decision maker would default to daughter Cheri and Latesha by [place] 169.  Advance Directive/Living Will: Had recently completed 5 wishes documented but not had it witnessed.   POLST: No  POA Forms: No    We appreciate the invitation to be involved in this patient's care.  We will continue to follow throughout this hospitalization.  Please do not hesitate to reach our on call provider through our clinic answering service at  [phone] should you have acute symptom control concerns.    Ryan Deal PA-C  Palliative and Supportive Care  Clinic/Answering Service: [phone]  You can find me on [employer]!     Identification:  Inpatient consult to Palliative Care  Consult performed by: Ryan Deal PA-C  Consult ordered by: TARUN Soni        Physician Requesting Consult:Anni Iniguez DO  Reason for Consult / Principal Problem: GOC counseling secondary to respiratory failure  Date of admission:1/1/2024  Length of stay:1    History of Present Illness    Dionne Zuluaga is a 89 y.o. female who presents with a palliative diagnosis of acute respiratory failure, sepsis due to pneumonia.   She was brought into the ED at [facility] on 01/01/2024 secondary to shortness of breath x6 days. Patient found to have pneumonia.  Admitted to ICU today due to worsening shortness of breath and hypoxia, was placed on Bipap. PSC team consulted for GOC.    Met with patient who was visited by her daughter Cheri and granddaughter, as well as her other daughter Latesha via phone call. We had an in depth GOC discussion. Patient revealed her desire to continue to live. We reviewed CODE status and she would like to remain a full code. Critical care team had discussed the impending need for intubation which patient was hesitant to agree to. She fears she would never be able to come off the ventilator and would not want to continue to be kept alive by artifical means. The patient and family would like to pursue BiPAP first. If needed, patient is agreeable to a trial of intubation.She entrusts her daughters has her HCRs. She does not have a living will. She had recently completed a 5 wishes document but had not had it witnessed. Family says they can bring this document to the hospital. Prior to this hospitalization she had been doing well and reports good QoL. We had reviewed comfort care and patient and family displayed relief in knowing her  symptoms could be managed if these measures would fail to improve her health status. [facility] services reviewed and all questions and concerns addressed to patient's and family's satisfaction. Contact information provided to family for further support if needed.       Review of Systems   Constitutional:  Negative for activity change, appetite change and fatigue.   Eyes: Negative.    Respiratory:  Positive for cough and shortness of breath.    Cardiovascular:  Negative for chest pain.   Gastrointestinal:  Negative for abdominal pain, constipation, diarrhea, nausea and vomiting.   Genitourinary: Negative.    Musculoskeletal:  Negative for arthralgias, back pain, gait problem and myalgias.   Skin:  Negative for color change, pallor and wound.   Neurological:  Negative for dizziness, weakness, light-headedness and headaches.   Hematological: Negative.    Psychiatric/Behavioral:  Negative for confusion and sleep disturbance. The patient is not nervous/anxious.      Past Medical History:   Diagnosis Date    Hyperlipidemia     Hypertension      Past Surgical History:   Procedure Laterality Date    HYSTERECTOMY  1974     Social History     Socioeconomic History    Marital status:      Spouse name: Not on file    Number of children: Not on file    Years of education: Not on file    Highest education level: Not on file   Occupational History    Not on file   Tobacco Use    Smoking status: Every Day     Current packs/day: 0.50     Types: Cigarettes    Smokeless tobacco: Never   Substance and Sexual Activity    Alcohol use: Not Currently    Drug use: Never    Sexual activity: Not Currently   Other Topics Concern    Not on file   Social History Narrative    Not on file     Social Determinants of Health     Financial Resource Strain: Not on file   Food Insecurity: Not on file   Transportation Needs: Not on file   Physical Activity: Not on file   Stress: Not on file   Social Connections: Not on file   Intimate Partner Violence:  Not on file   Housing Stability: Not on file     History reviewed. No pertinent family history.    Medications:  all current active meds have been reviewed, current meds:   Current Facility-Administered Medications   Medication Dose Route Frequency    acetaminophen (TYLENOL) tablet 650 mg  650 mg Oral Q6H PRN    azithromycin (ZITHROMAX) 500 mg in sodium chloride 0.9 % 250 mL IVPB  500 mg Intravenous Q24H    benzonatate (TESSALON PERLES) capsule 100 mg  100 mg Oral TID PRN    cefepime (MAXIPIME) IVPB (premix in dextrose) 2,000 mg 50 mL  2,000 mg Intravenous Q12H    chlorhexidine (PERIDEX) 0.12 % oral rinse 15 mL  15 mL Mouth/Throat Q12H RUTH    dextromethorphan-guaiFENesin (ROBITUSSIN DM) oral syrup 10 mL  10 mL Oral Q4H PRN    heparin (porcine) subcutaneous injection 5,000 Units  5,000 Units Subcutaneous Q8H RUTH    insulin lispro (HumaLOG) 100 units/mL subcutaneous injection 1-6 Units  1-6 Units Subcutaneous Q6H RUTH    ipratropium (ATROVENT) 0.02 % inhalation solution 0.5 mg  0.5 mg Nebulization TID    levalbuterol (XOPENEX) inhalation solution 1.25 mg  1.25 mg Nebulization TID    lisinopril (ZESTRIL) tablet 20 mg  20 mg Oral Daily    methylPREDNISolone sodium succinate (Solu-MEDROL) 125 MG injection **ADS Override Pull**        methylPREDNISolone sodium succinate (Solu-MEDROL) injection 60 mg  60 mg Intravenous Q6H RUTH    ondansetron (ZOFRAN) injection 4 mg  4 mg Intravenous Q6H PRN    sodium chloride 3 % inhalation solution 4 mL  4 mL Nebulization TID   , and PTA meds:   Prior to Admission Medications   Prescriptions Last Dose Informant Patient Reported? Taking?   Multiple Vitamin (MULTIVITAMIN) tablet 1/1/2024  Yes Yes   Sig: Take 1 tablet by mouth daily   Omega-3 Fatty Acids (FISH OIL) 1,000 mg 1/1/2024 Self Yes Yes   Sig: Take 1,000 mg by mouth daily   calcium citrate-vitamin D (CITRACAL+D) 315-200 MG-UNIT per tablet 1/1/2024  Yes Yes   Sig: Take 1 tablet by mouth 2 (two) times a day  "  diphenhydrAMINE-acetaminophen (TYLENOL PM)  MG TABS Unknown Self Yes No   Sig: Take 1 tablet by mouth daily at bedtime as needed for sleep   lisinopril-hydrochlorothiazide (PRINZIDE,ZESTORETIC) 20-12.5 MG per tablet 1/1/2024  Yes Yes   predniSONE 10 mg tablet Not Taking  No No   Sig: 3 tabs daily for 3 days, 2 tabs daily for 3 days, 1 tab daily for 3 days   Patient not taking: Reported on 1/1/2024   sertraline (ZOLOFT) 25 mg tablet Not Taking  Yes No   Patient not taking: Reported on 1/1/2024   simvastatin (ZOCOR) 40 mg tablet 1/1/2024  Yes Yes   vitamin E, tocopherol, 1,000 units capsule 1/1/2024  Yes Yes   Sig: Take 1,000 Units by mouth daily      Facility-Administered Medications: None       Allergies   Allergen Reactions    Alcohol - Food Allergy Hives       Objective:  BP (!) 176/95   Pulse 93   Temp 97.8 °F (36.6 °C)   Resp 18   Ht 5' 3\" (1.6 m)   Wt 92 kg (202 lb 13.2 oz)   SpO2 97%   BMI 35.93 kg/m²     Physical Exam  Vitals and nursing note reviewed.   Constitutional:       General: She is in acute distress.      Appearance: She is ill-appearing.      Comments: BiPAP   HENT:      Head: Normocephalic and atraumatic.   Cardiovascular:      Rate and Rhythm: Normal rate.   Pulmonary:      Effort: Pulmonary effort is normal.   Musculoskeletal:         General: No deformity.   Skin:     General: Skin is warm and dry.   Neurological:      Mental Status: She is alert and oriented to person, place, and time.   Psychiatric:         Mood and Affect: Mood is anxious.         Lab Results: I have personally reviewed pertinent labs., CBC:   Lab Results   Component Value Date    WBC 18.00 (H) 01/02/2024    HGB 12.3 01/02/2024    HCT 37.0 01/02/2024    MCV 92 01/02/2024     01/02/2024    RBC 4.01 01/02/2024    MCH 30.7 01/02/2024    MCHC 33.2 01/02/2024    RDW 13.1 01/02/2024    MPV 9.2 01/02/2024    NRBC 0 01/01/2024   , CMP:   Lab Results   Component Value Date    SODIUM 120 (L) 01/02/2024    K 3.7 " 01/02/2024    CL 87 (L) 01/02/2024    CO2 25 01/02/2024    BUN 23 01/02/2024    CREATININE 1.05 01/02/2024    CALCIUM 8.9 01/02/2024    AST 51 (H) 01/02/2024    ALT 49 01/02/2024    ALKPHOS 50 01/02/2024    EGFR 46 01/02/2024     Imaging Studies: I have personally reviewed pertinent reports.  CTA ED chest PE Study    Result Date: 1/1/2024  Narrative: CTA - CHEST WITH IV CONTRAST - PULMONARY ANGIOGRAM INDICATION:   hypoxia, elevated ddimer. COMPARISON: Chest x-ray dated the same TECHNIQUE: CTA examination of the chest was performed using angiographic technique according to a protocol specifically tailored to evaluate for pulmonary embolism.  Multiplanar 2D reformatted images were created from the source data. In addition, coronal 3D MIP postprocessing was performed on the acquisition scanner. Radiation dose length product (DLP) for this visit:  606 mGy-cm .  This examination, like all CT scans performed in the [facility], was performed utilizing techniques to minimize radiation dose exposure, including the use of iterative reconstruction and automated exposure control. IV Contrast:  85 mL of iohexol (OMNIPAQUE) FINDINGS: PULMONARY ARTERIAL TREE: Some images are suboptimal secondary to respiratory motion which decreases sensitivity for evaluation of peripheral pulmonary emboli, subject to this, no central pulmonary embolism is seen. LUNGS:  There is attenuation of multiple small airways in the right lower lobe which could be related to infectious or inflammatory bronchitis, aspiration, and/or mucous plugging. Linear atelectasis in the bilateral lower lung fields. Visualized lungs otherwise appear grossly clear. PLEURA:  Unremarkable. HEART/GREAT VESSELS: Heart appears normal in size. Mild to moderate. Coronary atherosclerosis. Mitral annular calcifications. Mild to moderate aortic atherosclerosis. Saccular aneurysm of the abdominal aorta at the level of the kidneys measuring approximately 0.7 cm in  "size (axial image 262, series 2 and coronal image 105, series 601). No thoracic aortic aneurysm. MEDIASTINUM AND LIA:  Unremarkable. CHEST WALL AND LOWER NECK:   Unremarkable. VISUALIZED STRUCTURES IN THE UPPER ABDOMEN: Several renal cysts are noted, largest is exophytic in the anterior left kidney measuring approximately 7 cm in size OSSEOUS STRUCTURES:  No acute fracture or destructive osseous lesion.     Impression: Some images are suboptimal secondary to respiratory motion which decreases sensitivity for evaluation of peripheral pulmonary emboli, subject to this, no central pulmonary embolism is seen. Attenuation of multiple small airways in the right lower lobe which could be related to infectious or inflammatory bronchitis, aspiration, and/or mucous plugging. Correlation with the patient's symptoms and laboratory values recommended Coronary atherosclerosis, aortic atherosclerosis, saccular aneurysm of the abdominal aorta at the level of the kidneys measuring approximately 0.7 cm in size (axial image 262, series 2 and coronal image 105, series 601). Other findings as above. Workstation performed: [id number]        EKG, Pathology, and Other Studies: I have personally reviewed pertinent reports.      Counseling / Coordination of Care  Total floor / unit time spent today 60 minutes. Greater than 50% of total time was spent with the patient and / or family counseling and / or coordination of care. A description of the counseling / coordination of care: Reviewed chart, provided medical updates, determined goals of care, discussed palliative care and symptom management, discussed comfort care and hospice care, discussed code status, provided anticipatory guidance, determined competency and POA/HCA, determined social/family support, provided psychosocial support. Reviewed with JAVIER, ICU team, RN.      Portions of this document may have been created using dictation software and as such some \"sound alike\" terms may " have been generated by the system. Do not hesitate to contact me with any questions or clarifications.

## 2024-01-02 NOTE — ASSESSMENT & PLAN NOTE
Currently requiring 8L mid flow nasal cannula and does not require supplemental oxygen at baseline  CTA Chest (1/1): No central pulmonary embolism is seen. Attenuation of multiple small airways in the right lower lobe which could be related to infectious or inflammatory bronchitis, aspiration, and/or mucous plugging.  Continue Atrovent/Xopenex TID  Increase Solumedrol to 60mg Q6H  Wean oxygen as tolerated  Will upgrade the patient to SD2 level of care  Pulmonology consultation requested

## 2024-01-02 NOTE — H&P
[facility]  H&P  Name: Dionne Zuluaga 89 y.o. female I MRN: [id number]  Unit/Bed#: -01 I Date of Admission: 1/1/2024   Date of Service: 1/1/2024 I Hospital Day: 0      Assessment/Plan   * Sepsis due to pneumonia (HCC)  Assessment & Plan  Admit to medicine  Blood cultures and urine cultures are currently pending  Obtain urine for strep pneumo and Legionella  Placed on O2 and respiratory protocol  Will give Rocephin 1 g IV daily  Will give Robitussin DM as needed and Mucinex 600 mg p.o. every 12 hours   Hydrate with normal saline 125 cc/h  Trend lactic acid and procalcitonin  Patient met sepsis criteria in that she was tachycardic with a heart rate as high as 118, tachypneic with a respiratory rate as high as 26, leukocytosis of 17.03 and a known source of infection being community-acquired pneumonia    Acute hypoxic respiratory failure (HCC)  Assessment & Plan  Likely multifactorial as patient likely has component of diagnosed underlying COPD  Patient is currently requiring mid flow O2 continue O2 saturation 92% and was hypoxic with an O2 sat as low as 86% on 6 L nasal cannula  Will give Solu-Medrol 40 mg IV every 8 hours  Placed on O2 and respiratory protocol  Explained to patient and her family importance of formal pulmonary consult after resolution of her acute pneumonia to establish possible diagnosis    Hyponatremia  Assessment & Plan  Last sodium available for review dated 8/14/2023 140 versus 121 today  Will obtain every 8 BMPs  Hydrate with normal saline 125 cc/h  Obtain urine sodium and osmolality    Elevated d-dimer  Assessment & Plan  D-dimer was 2.98  CTA is currently pending  No signs or symptoms of DVT  Will place on heparin 5000 subcu every 8 hours  Will escalate anticoagulation as needed based on CTA results    Type 2 diabetes mellitus with stage 3 chronic kidney disease, without long-term current use of insulin (HCC)  Assessment & Plan  Lab Results   Component Value Date     HGBA1C 6.5 (H) 08/14/2023       Recent Labs     01/01/24  2207   POCGLU 261*       Blood Sugar Average: Last 72 hrs:  (P) 261    Placed on [facility] type II diet  Obtain Accu-Cheks before meals and at bedtime with Humalog correction dose before meals and at bedtime    Tobacco use disorder  Assessment & Plan  Counseled patient on importance of smoking cessation  Offered nicotine replacement which she declined    Mixed hyperlipidemia  Assessment & Plan  Substitute Pravachol 80 mg p.o. daily for prehospital Zocor    Essential hypertension  Assessment & Plan  Continue prehospital lisinopril 20 mg p.o. daily and hydrochlorothiazide 12.5 mg p.o. daily         VTE Prophylaxis: Heparin  Code Status: Level 1  POLST: There is no POLST form on file for this patient (pre-hospital)  Discussion with family: Family who was present at bedside at time of exam, diagnosis and treatment plan were reviewed and all questions answered to their satisfaction    Anticipated Length of Stay:  Patient will be admitted on an Inpatient basis with an anticipated length of stay of  < 2 midnights.   Justification for Hospital Stay: Sepsis secondary to pneumonia with acute hypoxic respiratory failure requiring IV antibiotics, O2 and frequent nebulizer treatments    Chief Complaint:   Shortness of breath x 6 days    History of Present Illness:    Dionne Zuluaga is a 89 y.o. female who presents with redness of breath x 6 days.  Patient with longstanding history of smoking presents ER for further evaluation and treatment of 6-day history of increasing shortness of breath to the point of dyspnea at rest.  Patient was previously started on a short course of steroids by her PCP who presents to ER this evening with persistent cough which is productive of whitish sputum denies any fever or chills, no recent sick contacts.  Patient does not use any respiratory medications at home, she does not normally require O2 and does not follow with pulmonary as an  outpatient.    While in ER patient was noted to be hypoxic with an O2 saturation as low as 86% despite 6 L nasal cannula was subsequently transition to 8 L mid flow.  Patient is resting comfortably in bed at time of exam, she has a wet cough and is fatigued as she states that she has not been able to sleep for the past 2 nights due to her shortness of breath.  Additionally patient's D-dimer was noted to be elevated and a CTA is currently pending.    Review of Systems:  Review of Systems   Constitutional:  Negative for chills and fever.   HENT:  Negative for congestion and sore throat.    Respiratory:  Positive for cough and shortness of breath. Negative for wheezing.    Cardiovascular:  Negative for chest pain and palpitations.   Gastrointestinal:  Negative for abdominal pain, diarrhea, nausea and vomiting.   Genitourinary:  Negative for dysuria, frequency, hematuria and urgency.   Musculoskeletal:  Negative for arthralgias and myalgias.   Skin:  Negative for wound.   Neurological:  Negative for dizziness, syncope, light-headedness and headaches.   All other systems reviewed and are negative.      Past Medical and Surgical History:   Past Medical History:   Diagnosis Date    Hyperlipidemia     Hypertension        Past Surgical History:   Procedure Laterality Date    HYSTERECTOMY  1974       Meds/Allergies:  Prior to Admission medications    Medication Sig Start Date End Date Taking? Authorizing Provider   calcium citrate-vitamin D (CITRACAL+D) 315-200 MG-UNIT per tablet Take 1 tablet by mouth 2 (two) times a day   Yes Historical Provider, MD   lisinopril-hydrochlorothiazide (PRINZIDE,ZESTORETIC) 20-12.5 MG per tablet  6/11/19  Yes Historical Provider, MD   Multiple Vitamin (MULTIVITAMIN) tablet Take 1 tablet by mouth daily   Yes Historical Provider, MD   Omega-3 Fatty Acids (FISH OIL) 1,000 mg Take 1,000 mg by mouth daily   Yes Historical Provider, MD   simvastatin (ZOCOR) 40 mg tablet  5/1/19  Yes Historical  "Provider, MD   vitamin E, tocopherol, 1,000 units capsule Take 1,000 Units by mouth daily   Yes Historical Provider, MD   diphenhydrAMINE-acetaminophen (TYLENOL PM)  MG TABS Take 1 tablet by mouth daily at bedtime as needed for sleep    Historical Provider, MD   predniSONE 10 mg tablet 3 tabs daily for 3 days, 2 tabs daily for 3 days, 1 tab daily for 3 days  Patient not taking: Reported on 1/1/2024 6/17/19   Kelby Quinones PA-C   sertraline (ZOLOFT) 25 mg tablet  4/30/19   Historical Provider, MD     I have reviewed home medications with patient personally.    Allergies:   Allergies   Allergen Reactions    Alcohol - Food Allergy Hives       Social History:  Marital Status:    Occupation: Retired  Patient Pre-hospital Living Situation: Lives at home alone  Patient Pre-hospital Level of Mobility: Full without assist  Patient Pre-hospital Diet Restrictions: Diabetic    Social History     Substance and Sexual Activity   Alcohol Use Not Currently     Social History     Tobacco Use   Smoking Status Every Day   Smokeless Tobacco Never     Social History     Substance and Sexual Activity   Drug Use Never       Family History:  I have reviewed the patients family history    Physical Exam:   Vitals:   Blood Pressure: (!) 167/131 (01/01/24 2216)  Pulse: (!) 117 (01/01/24 2216)  Temperature: 99.5 °F (37.5 °C) (01/01/24 2212)  Temp Source: Temporal (01/01/24 2100)  Respirations: (!) 26 (01/01/24 2245)  Height: 5' 3\" (160 cm) (01/01/24 2114)  Weight - Scale: 92 kg (202 lb 13.2 oz) (01/01/24 2114)  SpO2: 92 % (01/01/24 2216)    Physical Exam  Vitals and nursing note reviewed.   Constitutional:       General: She is not in acute distress.     Appearance: Normal appearance.   HENT:      Head: Normocephalic and atraumatic.      Right Ear: Tympanic membrane normal.      Left Ear: Tympanic membrane normal.      Nose: Nose normal.      Mouth/Throat:      Mouth: Mucous membranes are moist.      Pharynx: Oropharynx is clear. " No posterior oropharyngeal erythema.   Eyes:      Extraocular Movements: Extraocular movements intact.      Conjunctiva/sclera: Conjunctivae normal.      Pupils: Pupils are equal, round, and reactive to light.   Cardiovascular:      Rate and Rhythm: Normal rate and regular rhythm.      Pulses: Normal pulses.      Heart sounds: Normal heart sounds. No murmur heard.  Pulmonary:      Effort: Pulmonary effort is normal. Tachypnea present. No respiratory distress.      Breath sounds: Wheezing and rhonchi present. No rales.   Abdominal:      General: Bowel sounds are normal.      Palpations: Abdomen is soft.      Tenderness: There is no abdominal tenderness.   Musculoskeletal:      Cervical back: Normal range of motion and neck supple. No muscular tenderness.      Left lower leg: No edema.   Skin:     General: Skin is warm and dry.      Capillary Refill: Capillary refill takes less than 2 seconds.   Neurological:      General: No focal deficit present.      Mental Status: She is alert and oriented to person, place, and time.         Additional Data:   Lab Results: I have personally reviewed pertinent reports.    Results from last 7 days   Lab Units 01/01/24  1643   WBC Thousand/uL 17.03*   HEMOGLOBIN g/dL 13.7   HEMATOCRIT % 41.6   PLATELETS Thousands/uL 258   NEUTROS PCT % 86*   LYMPHS PCT % 7*   MONOS PCT % 7   EOS PCT % 0     Results from last 7 days   Lab Units 01/01/24  1643   SODIUM mmol/L 121*   POTASSIUM mmol/L 3.7   CHLORIDE mmol/L 85*   CO2 mmol/L 27   BUN mg/dL 24   CREATININE mg/dL 1.11   CALCIUM mg/dL 9.2   ALK PHOS U/L 61   ALT U/L 23   AST U/L 29     Results from last 7 days   Lab Units 01/01/24  1643   INR  1.06     Results from last 7 days   Lab Units 01/01/24  2207   POC GLUCOSE mg/dl 261*           Imaging: I have personally reviewed pertinent reports.    XR chest 1 view portable    (Results Pending)   CTA ED chest PE Study    (Results Pending)       EKG, Pathology, and Other Studies Reviewed on  Admission:   EKG: N/A    Epic Records Reviewed: Yes     ** Please Note: This note has been constructed using a voice recognition system. **

## 2024-01-02 NOTE — ASSESSMENT & PLAN NOTE
Admitted on 01/01 for progressive SOB x6D - transferred to ICU today for respiratory distress  Suspect this is 2/2 pneumonia w/likely COPD exacerbation though no formal COPD diagnosis per chart review  Initial CT imaging revealed no PE, however did reveal attenuation of multiple small airways in right lower lobe which could be related to infectious or inflammatory bronchitis, aspiration, and/or mucous plugging  COVID/FLU/RSV negative  Urine strep/legionella negative  Will send sputum culture now  Currently on IV Solu Medrol 60 mg Q6H - would continue for now  Continue scheduled Xopenex/Atrovent nebs - will add HTS nebs  Will expand antibiotics to include IV Cefepime/Vancomycin/Azithromycin   Aggressive pulmonary hygiene  After GOC discussion w/family and patient at bedside, patient would like to try BIPAP rather than be intubated at this time and is aware that BIPAP may further push secretions into airways - she has decided that if she does not improve, she would, then, be agreeable to intubation

## 2024-01-02 NOTE — ASSESSMENT & PLAN NOTE
BP in 200s/80s on arrival to ICU  Suspect this is 2/2 respiratory distress given acute respiratory failure as noted above  Currently NPO given BIPAP - will plan for PRN IV Hydralazine, however if respiratory status does not improve, will likely require intubation   Holding home Lisinopril for now while NPO  Monitor hemodynamics closely

## 2024-01-02 NOTE — PROGRESS NOTES
Dionne Zuluaga is a 89 y.o. female who is currently ordered Vancomycin IV with management by the Pharmacy Consult service.  Relevant clinical data and objective / subjective history reviewed.  Vancomycin Assessment:  Indication and Goal AUC/Trough: Pneumonia (goal -600, trough >10)  Clinical Status: worsening  Micro:   Cx pending  Renal Function:  SCr: 1.05 mg/dL  CrCl: 37.1 mL/min  Renal replacement: Not on dialysis  Days of Therapy: 1  Current Dose: new start  Vancomycin Plan:  New Dosin mg LD then 1250 mg IV q24  Estimated AUC: 524 mcg*hr/mL  Estimated Trough: 16.9 mcg/mL  Next Level: 1/5 am  Renal Function Monitoring: Daily BMP and UOP  Pharmacy will continue to follow closely for s/sx of nephrotoxicity, infusion reactions and appropriateness of therapy.  BMP and CBC will be ordered per protocol. We will continue to follow the patient’s culture results and clinical progress daily.    Manoj Ocasio, Pharmacist

## 2024-01-02 NOTE — PLAN OF CARE
Problem: PAIN - ADULT  Goal: Verbalizes/displays adequate comfort level or baseline comfort level  Description: Interventions:  - Encourage patient to monitor pain and request assistance  - Assess pain using appropriate pain scale  - Administer analgesics based on type and severity of pain and evaluate response  - Implement non-pharmacological measures as appropriate and evaluate response  - Consider cultural and social influences on pain and pain management  - Notify physician/advanced practitioner if interventions unsuccessful or patient reports new pain  Outcome: Progressing     Problem: INFECTION - ADULT  Goal: Absence or prevention of progression during hospitalization  Description: INTERVENTIONS:  - Assess and monitor for signs and symptoms of infection  - Monitor lab/diagnostic results  - Monitor all insertion sites, i.e. indwelling lines, tubes, and drains  - Monitor endotracheal if appropriate and nasal secretions for changes in amount and color  - [place] appropriate cooling/warming therapies per order  - Administer medications as ordered  - Instruct and encourage patient and family to use good hand hygiene technique  - Identify and instruct in appropriate isolation precautions for identified infection/condition  Outcome: Progressing     Problem: SAFETY ADULT  Goal: Patient will remain free of falls  Description: INTERVENTIONS:  - Educate patient/family on patient safety including physical limitations  - Instruct patient to call for assistance with activity   - Consult OT/PT to assist with strengthening/mobility   - Keep Call bell within reach  - Keep bed low and locked with side rails adjusted as appropriate  - Keep care items and personal belongings within reach  - Initiate and maintain comfort rounds  - Make Fall Risk Sign visible to staff  - Offer Toileting every 2 Hours, in advance of need  - Initiate/Maintain bed alarm  - Apply yellow socks and bracelet for high fall risk patients  - Consider  moving patient to room near nurses station  Outcome: Progressing

## 2024-01-02 NOTE — CASE MANAGEMENT
Case Management Discharge Planning Note    Patient name Dionne Zuluaga  Location ICU 12/ICU  MRN [id number]  : 1931 Date 2024       Current Admission Date: 2024  Current Admission Diagnosis:Acute hypoxic respiratory failure (HCC)   Patient Active Problem List    Diagnosis Date Noted    Acute hypoxic respiratory failure (HCC) 2024    Hyponatremia 2024    Sepsis due to pneumonia (HCC) 2024    Elevated d-dimer 2024    Intrinsic eczema 2016    CKD stage G3b/A1, GFR 30-44 and albumin creatinine ratio <30 mg/g (HCC) 2013    Type 2 diabetes mellitus with stage 3 chronic kidney disease, without long-term current use of insulin (HCC) 10/25/2012    Tobacco use disorder 2012    Depression, major, in remission ([facility]) 2012    Hematuria 2012    Essential hypertension 2012    Mixed hyperlipidemia 10/31/2011      LOS (days): 1  Geometric Mean LOS (GMLOS) (days):   Days to GMLOS:     OBJECTIVE:  Risk of Unplanned Readmission Score: 10.66         Current admission status: Inpatient   Preferred Pharmacy:   [id number] - [place], [place] - [address] [address]  Phone: [phone] Fax: [phone]    [id number] - [place], [place] - [address]  [address] [address]  Phone: [phone] Fax: [phone]    Primary Care Provider: Megan Barros DO    Primary Insurance: MEDICARE  Secondary Insurance: [facility]    DISCHARGE DETAILS:      Additional Comments: Pt to ICU for increased work of breathing; patient placed on 8 liters mid flow nasal cannula. Message left for daughter Cheri Mendez [phone] to complete initial assessment. CM to follow.

## 2024-01-02 NOTE — ASSESSMENT & PLAN NOTE
Last sodium available for review dated 8/14/2023 140 versus 121 today  Will obtain every 8 BMPs  Hydrate with normal saline 125 cc/h  Obtain urine sodium and osmolality

## 2024-01-02 NOTE — ASSESSMENT & PLAN NOTE
Lab Results   Component Value Date    HGBA1C 6.5 (H) 08/14/2023       Recent Labs     01/01/24 2207 01/02/24  0710   POCGLU 261* 153*         Blood Sugar Average: Last 72 hrs:  (P) 207    Continue Accu-Cheks, corrective sliding scale insulin, and hypoglycemic protocol  Carb controlled diet

## 2024-01-02 NOTE — NURSING NOTE
Patient received from medical floor with increased work of breathing; patient placed on 8 liters mid flow nasal cannula; SAO2 85-88% ; patient currently able to answer questions appropriately.

## 2024-01-02 NOTE — ASSESSMENT & PLAN NOTE
D-dimer was 2.98  CTA is currently pending  No signs or symptoms of DVT  Will place on heparin 5000 subcu every 8 hours  Will escalate anticoagulation as needed based on CTA results

## 2024-01-03 ENCOUNTER — APPOINTMENT (INPATIENT)
Dept: NON INVASIVE DIAGNOSTICS | Facility: HOSPITAL | Age: 89
DRG: 871 | End: 2024-01-03
Payer: MEDICARE

## 2024-01-03 LAB
ALBUMIN SERPL BCP-MCNC: 3.3 G/DL (ref 3.5–5)
ALP SERPL-CCNC: 42 U/L (ref 34–104)
ALT SERPL W P-5'-P-CCNC: 52 U/L (ref 7–52)
ANION GAP SERPL CALCULATED.3IONS-SCNC: 8 MMOL/L
ANION GAP SERPL CALCULATED.3IONS-SCNC: 9 MMOL/L
AST SERPL W P-5'-P-CCNC: 43 U/L (ref 13–39)
BASOPHILS # BLD AUTO: 0.02 THOUSANDS/ÂΜL (ref 0–0.1)
BASOPHILS NFR BLD AUTO: 0 % (ref 0–1)
BILIRUB SERPL-MCNC: 0.48 MG/DL (ref 0.2–1)
BUN SERPL-MCNC: 30 MG/DL (ref 5–25)
BUN SERPL-MCNC: 39 MG/DL (ref 5–25)
CA-I BLD-SCNC: 1.14 MMOL/L (ref 1.12–1.32)
CA-I BLD-SCNC: 1.15 MMOL/L (ref 1.12–1.32)
CALCIUM ALBUM COR SERPL-MCNC: 9.2 MG/DL (ref 8.3–10.1)
CALCIUM SERPL-MCNC: 8.6 MG/DL (ref 8.4–10.2)
CALCIUM SERPL-MCNC: 8.8 MG/DL (ref 8.4–10.2)
CHLORIDE SERPL-SCNC: 91 MMOL/L (ref 96–108)
CHLORIDE SERPL-SCNC: 92 MMOL/L (ref 96–108)
CO2 SERPL-SCNC: 22 MMOL/L (ref 21–32)
CO2 SERPL-SCNC: 24 MMOL/L (ref 21–32)
CREAT SERPL-MCNC: 1.3 MG/DL (ref 0.6–1.3)
CREAT SERPL-MCNC: 1.58 MG/DL (ref 0.6–1.3)
EOSINOPHIL # BLD AUTO: 0 THOUSAND/ÂΜL (ref 0–0.61)
EOSINOPHIL NFR BLD AUTO: 0 % (ref 0–6)
ERYTHROCYTE [DISTWIDTH] IN BLOOD BY AUTOMATED COUNT: 13.2 % (ref 11.6–15.1)
GFR SERPL CREATININE-BSD FRML MDRD: 28 ML/MIN/1.73SQ M
GFR SERPL CREATININE-BSD FRML MDRD: 35 ML/MIN/1.73SQ M
GLUCOSE SERPL-MCNC: 177 MG/DL (ref 65–140)
GLUCOSE SERPL-MCNC: 190 MG/DL (ref 65–140)
GLUCOSE SERPL-MCNC: 201 MG/DL (ref 65–140)
GLUCOSE SERPL-MCNC: 211 MG/DL (ref 65–140)
GLUCOSE SERPL-MCNC: 223 MG/DL (ref 65–140)
GLUCOSE SERPL-MCNC: 226 MG/DL (ref 65–140)
HCT VFR BLD AUTO: 34.2 % (ref 34.8–46.1)
HGB BLD-MCNC: 11.3 G/DL (ref 11.5–15.4)
IMM GRANULOCYTES # BLD AUTO: 0.14 THOUSAND/UL (ref 0–0.2)
IMM GRANULOCYTES NFR BLD AUTO: 1 % (ref 0–2)
LYMPHOCYTES # BLD AUTO: 1.16 THOUSANDS/ÂΜL (ref 0.6–4.47)
LYMPHOCYTES NFR BLD AUTO: 7 % (ref 14–44)
MAGNESIUM SERPL-MCNC: 2.1 MG/DL (ref 1.9–2.7)
MAGNESIUM SERPL-MCNC: 2.2 MG/DL (ref 1.9–2.7)
MCH RBC QN AUTO: 30.7 PG (ref 26.8–34.3)
MCHC RBC AUTO-ENTMCNC: 33 G/DL (ref 31.4–37.4)
MCV RBC AUTO: 93 FL (ref 82–98)
MONOCYTES # BLD AUTO: 0.68 THOUSAND/ÂΜL (ref 0.17–1.22)
MONOCYTES NFR BLD AUTO: 4 % (ref 4–12)
NEUTROPHILS # BLD AUTO: 15.62 THOUSANDS/ÂΜL (ref 1.85–7.62)
NEUTS SEG NFR BLD AUTO: 88 % (ref 43–75)
NRBC BLD AUTO-RTO: 0 /100 WBCS
PHOSPHATE SERPL-MCNC: 3 MG/DL (ref 2.3–4.1)
PHOSPHATE SERPL-MCNC: 3.4 MG/DL (ref 2.3–4.1)
PLATELET # BLD AUTO: 247 THOUSANDS/UL (ref 149–390)
PMV BLD AUTO: 9.7 FL (ref 8.9–12.7)
POTASSIUM SERPL-SCNC: 3.8 MMOL/L (ref 3.5–5.3)
POTASSIUM SERPL-SCNC: 4.1 MMOL/L (ref 3.5–5.3)
PROCALCITONIN SERPL-MCNC: 0.31 NG/ML
PROT SERPL-MCNC: 6.2 G/DL (ref 6.4–8.4)
RBC # BLD AUTO: 3.68 MILLION/UL (ref 3.81–5.12)
SODIUM SERPL-SCNC: 123 MMOL/L (ref 135–147)
SODIUM SERPL-SCNC: 123 MMOL/L (ref 135–147)
WBC # BLD AUTO: 17.62 THOUSAND/UL (ref 4.31–10.16)

## 2024-01-03 PROCEDURE — 80053 COMPREHEN METABOLIC PANEL: CPT | Performed by: NURSE PRACTITIONER

## 2024-01-03 PROCEDURE — 82330 ASSAY OF CALCIUM: CPT | Performed by: NURSE PRACTITIONER

## 2024-01-03 PROCEDURE — 82948 REAGENT STRIP/BLOOD GLUCOSE: CPT

## 2024-01-03 PROCEDURE — 93970 EXTREMITY STUDY: CPT

## 2024-01-03 PROCEDURE — 94664 DEMO&/EVAL PT USE INHALER: CPT

## 2024-01-03 PROCEDURE — 94003 VENT MGMT INPAT SUBQ DAY: CPT

## 2024-01-03 PROCEDURE — 80048 BASIC METABOLIC PNL TOTAL CA: CPT | Performed by: NURSE PRACTITIONER

## 2024-01-03 PROCEDURE — 83735 ASSAY OF MAGNESIUM: CPT | Performed by: NURSE PRACTITIONER

## 2024-01-03 PROCEDURE — 87081 CULTURE SCREEN ONLY: CPT | Performed by: NURSE PRACTITIONER

## 2024-01-03 PROCEDURE — 85025 COMPLETE CBC W/AUTO DIFF WBC: CPT | Performed by: NURSE PRACTITIONER

## 2024-01-03 PROCEDURE — 93970 EXTREMITY STUDY: CPT | Performed by: SURGERY

## 2024-01-03 PROCEDURE — 84100 ASSAY OF PHOSPHORUS: CPT | Performed by: NURSE PRACTITIONER

## 2024-01-03 PROCEDURE — 94640 AIRWAY INHALATION TREATMENT: CPT

## 2024-01-03 PROCEDURE — 84145 PROCALCITONIN (PCT): CPT | Performed by: NURSE PRACTITIONER

## 2024-01-03 PROCEDURE — 94760 N-INVAS EAR/PLS OXIMETRY 1: CPT

## 2024-01-03 RX ORDER — AMOXICILLIN 250 MG
2 CAPSULE ORAL
Status: DISCONTINUED | OUTPATIENT
Start: 2024-01-03 | End: 2024-01-08 | Stop reason: HOSPADM

## 2024-01-03 RX ORDER — INSULIN LISPRO 100 [IU]/ML
2-12 INJECTION, SOLUTION INTRAVENOUS; SUBCUTANEOUS EVERY 6 HOURS SCHEDULED
Status: DISCONTINUED | OUTPATIENT
Start: 2024-01-03 | End: 2024-01-05

## 2024-01-03 RX ORDER — ALBUMIN, HUMAN INJ 5% 5 %
25 SOLUTION INTRAVENOUS ONCE
Status: COMPLETED | OUTPATIENT
Start: 2024-01-03 | End: 2024-01-03

## 2024-01-03 RX ORDER — VANCOMYCIN HYDROCHLORIDE 1 G/200ML
1000 INJECTION, SOLUTION INTRAVENOUS EVERY 24 HOURS
Status: DISCONTINUED | OUTPATIENT
Start: 2024-01-03 | End: 2024-01-04

## 2024-01-03 RX ADMIN — METHYLPREDNISOLONE SODIUM SUCCINATE 60 MG: 125 INJECTION, POWDER, FOR SOLUTION INTRAMUSCULAR; INTRAVENOUS at 23:32

## 2024-01-03 RX ADMIN — INSULIN LISPRO 1 UNITS: 100 INJECTION, SOLUTION INTRAVENOUS; SUBCUTANEOUS at 00:00

## 2024-01-03 RX ADMIN — HEPARIN SODIUM 5000 UNITS: 5000 INJECTION INTRAVENOUS; SUBCUTANEOUS at 21:33

## 2024-01-03 RX ADMIN — CEFEPIME HYDROCHLORIDE 2000 MG: 2 INJECTION, SOLUTION INTRAVENOUS at 09:58

## 2024-01-03 RX ADMIN — PROPOFOL 40 MCG/KG/MIN: 10 INJECTION, EMULSION INTRAVENOUS at 01:58

## 2024-01-03 RX ADMIN — PROPOFOL 30 MCG/KG/MIN: 10 INJECTION, EMULSION INTRAVENOUS at 06:41

## 2024-01-03 RX ADMIN — PROPOFOL 25 MCG/KG/MIN: 10 INJECTION, EMULSION INTRAVENOUS at 23:32

## 2024-01-03 RX ADMIN — AZITHROMYCIN MONOHYDRATE 500 MG: 500 INJECTION, POWDER, LYOPHILIZED, FOR SOLUTION INTRAVENOUS at 07:38

## 2024-01-03 RX ADMIN — VANCOMYCIN HYDROCHLORIDE 1000 MG: 1 INJECTION, SOLUTION INTRAVENOUS at 18:44

## 2024-01-03 RX ADMIN — SODIUM CHLORIDE SOLN NEBU 3% 4 ML: 3 NEBU SOLN at 20:31

## 2024-01-03 RX ADMIN — INSULIN LISPRO 4 UNITS: 100 INJECTION, SOLUTION INTRAVENOUS; SUBCUTANEOUS at 11:41

## 2024-01-03 RX ADMIN — LEVALBUTEROL HYDROCHLORIDE 1.25 MG: 1.25 SOLUTION RESPIRATORY (INHALATION) at 13:36

## 2024-01-03 RX ADMIN — LEVALBUTEROL HYDROCHLORIDE 1.25 MG: 1.25 SOLUTION RESPIRATORY (INHALATION) at 07:24

## 2024-01-03 RX ADMIN — PROPOFOL 25 MCG/KG/MIN: 10 INJECTION, EMULSION INTRAVENOUS at 13:06

## 2024-01-03 RX ADMIN — PROPOFOL 25 MCG/KG/MIN: 10 INJECTION, EMULSION INTRAVENOUS at 18:19

## 2024-01-03 RX ADMIN — SODIUM CHLORIDE SOLN NEBU 3% 4 ML: 3 NEBU SOLN at 07:24

## 2024-01-03 RX ADMIN — METHYLPREDNISOLONE SODIUM SUCCINATE 60 MG: 125 INJECTION, POWDER, FOR SOLUTION INTRAMUSCULAR; INTRAVENOUS at 06:01

## 2024-01-03 RX ADMIN — IPRATROPIUM BROMIDE 0.5 MG: 0.5 SOLUTION RESPIRATORY (INHALATION) at 13:47

## 2024-01-03 RX ADMIN — IPRATROPIUM BROMIDE 0.5 MG: 0.5 SOLUTION RESPIRATORY (INHALATION) at 07:32

## 2024-01-03 RX ADMIN — SODIUM CHLORIDE SOLN NEBU 3% 4 ML: 3 NEBU SOLN at 13:36

## 2024-01-03 RX ADMIN — Medication 75 MCG/HR: at 13:06

## 2024-01-03 RX ADMIN — DOCUSATE SODIUM AND SENNOSIDES 2 TABLET: 8.6; 5 TABLET, FILM COATED ORAL at 21:33

## 2024-01-03 RX ADMIN — INSULIN LISPRO 2 UNITS: 100 INJECTION, SOLUTION INTRAVENOUS; SUBCUTANEOUS at 05:59

## 2024-01-03 RX ADMIN — INSULIN LISPRO 4 UNITS: 100 INJECTION, SOLUTION INTRAVENOUS; SUBCUTANEOUS at 17:30

## 2024-01-03 RX ADMIN — METHYLPREDNISOLONE SODIUM SUCCINATE 60 MG: 125 INJECTION, POWDER, FOR SOLUTION INTRAMUSCULAR; INTRAVENOUS at 11:39

## 2024-01-03 RX ADMIN — METHYLPREDNISOLONE SODIUM SUCCINATE 60 MG: 125 INJECTION, POWDER, FOR SOLUTION INTRAMUSCULAR; INTRAVENOUS at 17:31

## 2024-01-03 RX ADMIN — CHLORHEXIDINE GLUCONATE, 0.12% ORAL RINSE 15 ML: 1.2 SOLUTION DENTAL at 21:31

## 2024-01-03 RX ADMIN — HEPARIN SODIUM 5000 UNITS: 5000 INJECTION INTRAVENOUS; SUBCUTANEOUS at 14:13

## 2024-01-03 RX ADMIN — CEFEPIME HYDROCHLORIDE 2000 MG: 2 INJECTION, SOLUTION INTRAVENOUS at 21:33

## 2024-01-03 RX ADMIN — FENTANYL CITRATE 50 MCG: 50 INJECTION INTRAMUSCULAR; INTRAVENOUS at 08:56

## 2024-01-03 RX ADMIN — Medication 75 MCG/HR: at 02:20

## 2024-01-03 RX ADMIN — LEVALBUTEROL HYDROCHLORIDE 1.25 MG: 1.25 SOLUTION RESPIRATORY (INHALATION) at 20:31

## 2024-01-03 RX ADMIN — CHLORHEXIDINE GLUCONATE, 0.12% ORAL RINSE 15 ML: 1.2 SOLUTION DENTAL at 08:04

## 2024-01-03 RX ADMIN — HEPARIN SODIUM 5000 UNITS: 5000 INJECTION INTRAVENOUS; SUBCUTANEOUS at 06:01

## 2024-01-03 RX ADMIN — ALBUMIN (HUMAN) 25 G: 12.5 INJECTION, SOLUTION INTRAVENOUS at 20:06

## 2024-01-03 RX ADMIN — IPRATROPIUM BROMIDE 0.5 MG: 0.5 SOLUTION RESPIRATORY (INHALATION) at 20:31

## 2024-01-03 NOTE — ASSESSMENT & PLAN NOTE
Lab Results   Component Value Date    EGFR 36 01/02/2024    EGFR 46 01/02/2024    EGFR 43 01/01/2024    CREATININE 1.27 01/02/2024    CREATININE 1.05 01/02/2024    CREATININE 1.11 01/01/2024     Baseline creatinine appears to be around 1.4 - 1.9  Avoid nephrotoxic agents and hypotension  Trend renal indices  Strict I/O   Daily weights

## 2024-01-03 NOTE — PLAN OF CARE
Problem: SAFETY ADULT  Goal: Patient will remain free of falls  Description: INTERVENTIONS:  - Educate patient/family on patient safety including physical limitations  - Instruct patient to call for assistance with activity   - Consult OT/PT to assist with strengthening/mobility   - Keep Call bell within reach  - Keep bed low and locked with side rails adjusted as appropriate  - Keep care items and personal belongings within reach  - Initiate and maintain comfort rounds  - Make Fall Risk Sign visible to staff  - Offer Toileting every 2 Hours, in advance of need  - Initiate/Maintain bed alarm  - Obtain necessary fall risk management equipment:   - Apply yellow socks and bracelet for high fall risk patients  - Consider moving patient to room near nurses station  Outcome: Progressing     Problem: Nutrition/Hydration-ADULT  Goal: Nutrient/Hydration intake appropriate for improving, restoring or maintaining nutritional needs  Description: Monitor and assess patient's nutrition/hydration status for malnutrition. Collaborate with interdisciplinary team and initiate plan and interventions as ordered.  Monitor patient's weight and dietary intake as ordered or per policy. Utilize nutrition screening tool and intervene as necessary. Determine patient's food preferences and provide high-protein, high-caloric foods as appropriate.     INTERVENTIONS:  - Monitor oral intake, urinary output, labs, and treatment plans  - Assess nutrition and hydration status and recommend course of action  - Evaluate amount of meals eaten  - Assist patient with eating if necessary   - Allow adequate time for meals  - Recommend/ encourage appropriate diets, oral nutritional supplements, and vitamin/mineral supplements  - Order, calculate, and assess calorie counts as needed  - Recommend, monitor, and adjust tube feedings and TPN/PPN based on assessed needs  - Assess need for intravenous fluids  - Provide specific nutrition/hydration education as  appropriate  - Include patient/family/caregiver in decisions related to nutrition  Outcome: Progressing     Problem: Prexisting or High Potential for Compromised Skin Integrity  Goal: Skin integrity is maintained or improved  Description: INTERVENTIONS:  - Identify patients at risk for skin breakdown  - Assess and monitor skin integrity  - Assess and monitor nutrition and hydration status  - Monitor labs   - Assess for incontinence   - Turn and reposition patient  - Assist with mobility/ambulation  - Relieve pressure over bony prominences  - Avoid friction and shearing  - Provide appropriate hygiene as needed including keeping skin clean and dry  - Evaluate need for skin moisturizer/barrier cream  - Collaborate with interdisciplinary team   - Patient/family teaching  - Consider wound care consult   Outcome: Progressing     Problem: SAFETY,RESTRAINT: NV/NON-SELF DESTRUCTIVE BEHAVIOR  Goal: Remains free of harm/injury (restraint for non violent/non self-detsructive behavior)  Description: INTERVENTIONS:  - Instruct patient/family regarding restraint use   - Assess and monitor physiologic and psychological status   - Provide interventions and comfort measures to meet assessed patient needs   - Identify and implement measures to help patient regain control  - Assess readiness for release of restraint   Outcome: Progressing  Goal: Returns to optimal restraint-free functioning  Description: INTERVENTIONS:  - Assess the patient's behavior and symptoms that indicate continued need for restraint  - Identify and implement measures to help patient regain control  - Assess readiness for release of restraint   Outcome: Progressing     Problem: RESPIRATORY - ADULT  Goal: Achieves optimal ventilation and oxygenation  Description: INTERVENTIONS:  - Assess for changes in respiratory status  - Assess for changes in mentation and behavior  - Position to facilitate oxygenation and minimize respiratory effort  - Oxygen administered by  appropriate delivery if ordered  - Initiate smoking cessation education as indicated  - Encourage broncho-pulmonary hygiene including cough, deep breathe, Incentive Spirometry  - Assess the need for suctioning and aspirate as needed  - Assess and instruct to report SOB or any respiratory difficulty  - Respiratory Therapy support as indicated  Outcome: Progressing

## 2024-01-03 NOTE — NURSING NOTE
Pt is sedated and resting.  Responds to tactile stimuli.  Cough and gag reflex preserved.  No focal neuro deficit though difficult to assess.  No spontaneous movement but grimaces and becomes rigid with care.  Pupils are equal but poorly reactive.  Heart tones clear with palpable pulses and no edema.  Skin is warm and dry with supple texture and good color.  Synchronous with the vent.  Lung sounds are very coarse with rhonchi throughout. Suctioned via ETT for large amount of thick yellow/creme secretions.  Oral care provided.  Abdomen is large and somewhat distended with hypoactive bowel sounds.  OGT patent with placement by auscultation. Small smear of green/bilious BM.  Urinary catheter draining clear pale yellow urine.  IV sites intact.  Skin is intact.  Bilateral soft wrist restraints in place to protect airway from accidental extubation.

## 2024-01-03 NOTE — PROGRESS NOTES
[facility]  Progress Note  Name: Dionne Zuluaga I  MRN: [id number]  Unit/Bed#: ICU 12-01 I Date of Admission: 1/1/2024   Date of Service: 1/3/2024 I Hospital Day: 2    Assessment/Plan   * Acute respiratory failure with hypoxia and hypercapnia (HCC)  Assessment & Plan  Admitted on 01/01 for progressive SOB x6D - transferred to ICU today for respiratory distress  Suspect this is 2/2 pneumonia w/likely COPD exacerbation though no formal COPD diagnosis per chart review  Initial CT imaging revealed no PE, however did reveal attenuation of multiple small airways in right lower lobe which could be related to infectious or inflammatory bronchitis, aspiration, and/or mucous plugging  COVID/FLU/RSV negative  Urine strep/legionella negative  Sputum Culture Pending  Currently on IV Solu Medrol 60 mg Q6H  Continue scheduled Xopenex/Atrovent HTS nebs  Continue IV Cefepime/Vancomycin/Azithromycin   Aggressive pulmonary hygiene  After GOC discussion w/family and patient at bedside, patient tried BIPAP rather than intubaation, but failed and thus  be agreed to intubation trial but would not want long term vent requirement    Severe sepsis (HCC)  Assessment & Plan  Met sepsis criteria on admission - now meeting severe sepsis given new need for BIPAP  In setting of pneumonia as noted above  Blood cultures NGTD  UA w/out evidence of infection  Urine strep/legionella negative  Will send sputum culture Pending  Lactic acid 0.9  Procalcitonin 0.11 - 0.22  Holding on IVF given tenuous respiratory status  Continues on IV Cefepime/Vancomycin/Azithromycin as noted above  Monitor fever and WBC curve  Trend procalcitonin and follow-up cutlures    Hypertensive emergency  Assessment & Plan  BP in 200s/80s on arrival to ICU  Suspect this is 2/2 respiratory distress given acute respiratory failure as noted above  Currently NPO given BIPAP - will plan for PRN IV Hydralazine, however if respiratory status does not improve,  will likely require intubation   Holding home Lisinopril for now while NPO  Monitor hemodynamics closely    Elevated d-dimer  Assessment & Plan  Imaging negative for PE  Check bilateral LE venous duplexes  Possibly elevated in setting of severe CAP    Hyponatremia  Assessment & Plan  Possibly in setting of decreased PO intake vs SIADH related to pneumonia vs outpatient HCTZ use  Currently NPO - holding on IVF  Continue to trend closely - plan for no more than 6-8 mEq change over 24H  Frequent neuro checks    Type 2 diabetes mellitus with stage 3 chronic kidney disease, without long-term current use of insulin ([facility])  Assessment & Plan  Lab Results   Component Value Date    HGBA1C 6.8 (H) 01/01/2024       Recent Labs     01/02/24  0710 01/02/24  1230 01/02/24  1743 01/02/24  2357   POCGLU 153* 140 179* 177*         Blood Sugar Average: Last 72 hrs:  (P) 182    Does not appear to take antihyperglycemic agents as outpatient  Continue SSI w/fingersticks Q6H  Goal  - 180    Tobacco use disorder  Assessment & Plan  NRT  Encourage cessation when appropriate    Mixed hyperlipidemia  Assessment & Plan  Holding home statin while NPO    Depression, major, in remission ([facility])  Assessment & Plan  Holding home Zoloft while NPO    CKD stage G3b/A1, GFR 30-44 and albumin creatinine ratio <30 mg/g ([facility])  Assessment & Plan  Lab Results   Component Value Date    EGFR 36 01/02/2024    EGFR 46 01/02/2024    EGFR 43 01/01/2024    CREATININE 1.27 01/02/2024    CREATININE 1.05 01/02/2024    CREATININE 1.11 01/01/2024     Baseline creatinine appears to be around 1.4 - 1.9  Avoid nephrotoxic agents and hypotension  Trend renal indices  Strict I/O   Daily weights             Disposition: Critical care    ICU Core Measures     Vented Patient  VAP Bundle  VAP bundle ordered     A: Assess, Prevent, and Manage Pain Has pain been assessed? Yes  Need for changes to pain regimen? No   B: Both Spontaneous Awakening Trials (SATs) and Spontaneous  Breathing Trials (SBTs) Plan to perform spontaneous awakening trial today? Yes   Plan to perform spontaneous breathing trial today? Yes   Obvious barriers to extubation? No   C: Choice of Sedation RASS Goal: -1 Drowsy  Need for changes to sedation or analgesia regimen? No   D: Delirium CAM-ICU: Negative   E: Early Mobility  Plan for early mobility? Yes   F: Family Engagement Plan for family engagement today? Yes       Antibiotic Review: Awaiting culture results.     Review of Invasive Devices:    Ruby Plan: Continue for accurate I/O monitoring for 48 hours        Prophylaxis:  VTE VTE covered by:  heparin (porcine), Subcutaneous, 5,000 Units at 01/02/24 2120       Stress Ulcer  not ordered         Significant 24hr Events     24hr events:   Brought over to ICU for respiratory failure  Attempted short course of BiPAP then was intubated  On low dose levo this AM     Subjective   Review of Systems   Unable to perform ROS: Intubated      Objective                            Vitals I/O      Most Recent Min/Max in 24hrs   Temp (!) 96.6 °F (35.9 °C) Temp  Min: 96.4 °F (35.8 °C)  Max: 98.2 °F (36.8 °C)   Pulse 57 Pulse  Min: 52  Max: 98   Resp 18 Resp  Min: 17  Max: 50   /56 BP  Min: 75/39  Max: 203/85   O2 Sat 95 % SpO2  Min: 82 %  Max: 98 %      Intake/Output Summary (Last 24 hours) at 1/3/2024 0552  Last data filed at 1/3/2024 0415  Gross per 24 hour   Intake 1597.66 ml   Output 1255 ml   Net 342.66 ml       Diet Enteral/Parenteral; Tube Feeding No Oral Diet; Jevity 1.2 Sebastián; Continuous; 45; 50; Water; Every 6 hours    Invasive Monitoring           Physical Exam   Physical Exam  Vitals and nursing note reviewed.   Eyes:      Conjunctiva/sclera: Conjunctivae normal.   Skin:     General: Skin is warm and dry.      Capillary Refill: Capillary refill takes less than 2 seconds.   HENT:      Head: Normocephalic and atraumatic.   Neck:      Vascular: No JVD.   Cardiovascular:      Rate and Rhythm: Regular rhythm.  Bradycardia present.      Pulses: Normal pulses.   Musculoskeletal:         General: Normal range of motion.      Right lower leg: Trace Edema present.      Left lower leg: Trace Edema present.   Abdominal: General: There is no distension.      Palpations: Abdomen is soft.      Tenderness: There is no abdominal tenderness.   Constitutional:       Appearance: She is well-developed and well-nourished.      Interventions: She is intubated and restrained.   Pulmonary:      Effort: Pulmonary effort is normal. She is intubated.      Breath sounds: Rhonchi present.   Neurological:      Motor: Strength full and intact in all extremities.      Comments: GCS 10 T (3, 1T, 6)   Genitourinary/Anorectal:     Comments: Deferred           Diagnostic Studies      EKG:   Imaging:  I have personally reviewed pertinent films in PACS     Medications:  Scheduled PRN   azithromycin, 500 mg, Q24H  cefepime, 2,000 mg, Q12H  chlorhexidine, 15 mL, Q12H RUTH  fentanyl citrate (PF), 100 mcg, Once  heparin (porcine), 5,000 Units, Q8H RUTH  insulin lispro, 1-6 Units, Q6H RUTH  ipratropium, 0.5 mg, TID  levalbuterol, 1.25 mg, TID  methylPREDNISolone sodium succinate, 60 mg, Q6H RUTH  midazolam, 2 mg, Once  sodium chloride, 4 mL, TID  vancomycin, 17.5 mg/kg (Adjusted), Q24H      acetaminophen, 650 mg, Q6H PRN  benzonatate, 100 mg, TID PRN  dextromethorphan-guaiFENesin, 10 mL, Q4H PRN  fentanyl citrate (PF), 50 mcg, Q1H PRN  ondansetron, 4 mg, Q6H PRN       Continuous    fentaNYL, 75 mcg/hr, Last Rate: 75 mcg/hr (01/03/24 0220)  norepinephrine, 1-30 mcg/min, Last Rate: 1 mcg/min (01/03/24 0507)  propofol, 5-50 mcg/kg/min, Last Rate: 35 mcg/kg/min (01/03/24 0400)         Labs:    CBC    Recent Labs     01/02/24  0238 01/02/24  1458 01/03/24  0441   WBC 18.00*  --  17.62*   HGB 12.3 11.2* 11.3*   HCT 37.0 33* 34.2*     --  247   BANDSPCT 5  --   --      BMP    Recent Labs     01/02/24  1655 01/03/24  0441   SODIUM 123* 123*   K 4.0 3.8   CL 89* 91*    CO2 23 24   AGAP 11 8   BUN 26* 30*   CREATININE 1.27 1.30   CALCIUM 8.7 8.6       Coags    Recent Labs     01/01/24  1643   INR 1.06   PTT 29        Additional Electrolytes  Recent Labs     01/02/24  1655 01/03/24  0441   MG 1.9 2.1   PHOS 3.7 3.4   CAIONIZED 1.11* 1.14          Blood Gas    Recent Labs     01/02/24 1959   PHART 7.377   VKI6JVA 40.6   PO2ART 83.2   NZJ4QXE 23.3   BEART -1.7   SOURCE Radial, Left     Recent Labs     01/02/24  1131 01/02/24 1959   PHVEN 7.307  --    NNF6CMD 51.6*  --    PO2VEN 39.9  --    JAY1GIX 25.2  --    BEVEN -1.7  --    Q2JNJAZ 72.4  --    SOURCE  --  Radial, Left    LFTs  Recent Labs     01/02/24  0238 01/03/24  0441   ALT 49 52   AST 51* 43*   ALKPHOS 50 42   ALB 3.6 3.3*   TBILI 0.30 0.48       Infectious  Recent Labs     01/02/24  0238 01/03/24  0441   PROCALCITONI 0.22 0.31*     Glucose  Recent Labs     01/01/24  1643 01/02/24  0238 01/02/24  1655 01/03/24  0441   GLUC 152* 178* 181* 211*               Critical Care Time Statement: Upon my evaluation, this patient had a high probability of imminent or life-threatening deterioration due to SHock state, hypoxic respiratory failure, which required my direct attention, intervention, and personal management.  I spent a total of 35 minutes directly providing critical care services, including interpretation of complex medical databases, evaluating for the presence of life-threatening injuries or illnesses, management of organ system failure(s) , complex medical decision making (to support/prevent further life-threatening deterioration)., interpretation of hemodynamic data, titration of vasoactive medications, and ventilator management. This time is exclusive of procedures, teaching, family meetings, and any prior time recorded by providers other than myself.      Migue Dempsey PA-C

## 2024-01-03 NOTE — PLAN OF CARE
Problem: PAIN - ADULT  Goal: Verbalizes/displays adequate comfort level or baseline comfort level  Description: Interventions:  - Encourage patient to monitor pain and request assistance  - Assess pain using appropriate pain scale  - Administer analgesics based on type and severity of pain and evaluate response  - Implement non-pharmacological measures as appropriate and evaluate response  - Consider cultural and social influences on pain and pain management  - Notify physician/advanced practitioner if interventions unsuccessful or patient reports new pain  Outcome: Progressing     Problem: INFECTION - ADULT  Goal: Absence or prevention of progression during hospitalization  Description: INTERVENTIONS:  - Assess and monitor for signs and symptoms of infection  - Monitor lab/diagnostic results  - Monitor all insertion sites, i.e. indwelling lines, tubes, and drains  - Monitor endotracheal if appropriate and nasal secretions for changes in amount and color  - [place] appropriate cooling/warming therapies per order  - Administer medications as ordered  - Instruct and encourage patient and family to use good hand hygiene technique  - Identify and instruct in appropriate isolation precautions for identified infection/condition  Outcome: Progressing     Problem: SAFETY ADULT  Goal: Patient will remain free of falls  Description: INTERVENTIONS:  - Educate patient/family on patient safety including physical limitations  - Instruct patient to call for assistance with activity   - Consult OT/PT to assist with strengthening/mobility   - Keep Call bell within reach  - Keep bed low and locked with side rails adjusted as appropriate  - Keep care items and personal belongings within reach  - Initiate and maintain comfort rounds  - Make Fall Risk Sign visible to staff  - Offer Toileting every 2 Hours, in advance of need  - Initiate/Maintain bed alarm  - Obtain necessary fall risk management equipment:   Problem: DISCHARGE  PLANNING  Goal: Discharge to home or other facility with appropriate resources  Description: INTERVENTIONS:  - Identify barriers to discharge w/patient and caregiver  - Arrange for needed discharge resources and transportation as appropriate  - Identify discharge learning needs (meds, wound care, etc.)  - Arrange for interpretive services to assist at discharge as needed  - Refer to Case Management Department for coordinating discharge planning if the patient needs post-hospital services based on physician/advanced practitioner order or complex needs related to functional status, cognitive ability, or social support system  Outcome: Progressing     Problem: Knowledge Deficit  Goal: Patient/family/caregiver demonstrates understanding of disease process, treatment plan, medications, and discharge instructions  Description: Complete learning assessment and assess knowledge base.  Interventions:  - Provide teaching at level of understanding  - Provide teaching via preferred learning methods  Outcome: Progressing     Problem: Nutrition/Hydration-ADULT  Goal: Nutrient/Hydration intake appropriate for improving, restoring or maintaining nutritional needs  Description: Monitor and assess patient's nutrition/hydration status for malnutrition. Collaborate with interdisciplinary team and initiate plan and interventions as ordered.  Monitor patient's weight and dietary intake as ordered or per policy. Utilize nutrition screening tool and intervene as necessary. Determine patient's food preferences and provide high-protein, high-caloric foods as appropriate.     INTERVENTIONS:  - Monitor oral intake, urinary output, labs, and treatment plans  - Assess nutrition and hydration status and recommend course of action  - Evaluate amount of meals eaten  - Assist patient with eating if necessary   - Allow adequate time for meals  - Recommend/ encourage appropriate diets, oral nutritional supplements, and vitamin/mineral supplements  -  Order, calculate, and assess calorie counts as needed  - Recommend, monitor, and adjust tube feedings and TPN/PPN based on assessed needs  - Assess need for intravenous fluids  - Provide specific nutrition/hydration education as appropriate  - Include patient/family/caregiver in decisions related to nutrition  Outcome: Progressing     Problem: Prexisting or High Potential for Compromised Skin Integrity  Goal: Skin integrity is maintained or improved  Description: INTERVENTIONS:  - Identify patients at risk for skin breakdown  - Assess and monitor skin integrity  - Assess and monitor nutrition and hydration status  - Monitor labs   - Assess for incontinence   - Turn and reposition patient  - Assist with mobility/ambulation  - Relieve pressure over bony prominences  - Avoid friction and shearing  - Provide appropriate hygiene as needed including keeping skin clean and dry  - Evaluate need for skin moisturizer/barrier cream  - Collaborate with interdisciplinary team   - Patient/family teaching  - Consider wound care consult   Outcome: Progressing     Problem: SAFETY,RESTRAINT: NV/NON-SELF DESTRUCTIVE BEHAVIOR  Goal: Remains free of harm/injury (restraint for non violent/non self-detsructive behavior)  Description: INTERVENTIONS:  - Instruct patient/family regarding restraint use   - Assess and monitor physiologic and psychological status   - Provide interventions and comfort measures to meet assessed patient needs   - Identify and implement measures to help patient regain control  - Assess readiness for release of restraint   Outcome: Progressing  Goal: Returns to optimal restraint-free functioning  Description: INTERVENTIONS:  - Assess the patient's behavior and symptoms that indicate continued need for restraint  - Identify and implement measures to help patient regain control  - Assess readiness for release of restraint   Outcome: Progressing     Problem: RESPIRATORY - ADULT  Goal: Achieves optimal ventilation and  oxygenation  Description: INTERVENTIONS:  - Assess for changes in respiratory status  - Assess for changes in mentation and behavior  - Position to facilitate oxygenation and minimize respiratory effort  - Oxygen administered by appropriate delivery if ordered  - Initiate smoking cessation education as indicated  - Encourage broncho-pulmonary hygiene including cough, deep breathe, Incentive Spirometry  - Assess the need for suctioning and aspirate as needed  - Assess and instruct to report SOB or any respiratory difficulty  - Respiratory Therapy support as indicated  Outcome: Progressing     - Apply yellow socks and bracelet for high fall risk patients  - Consider moving patient to room near nurses station  Outcome: Progressing  Goal: Maintain or return to baseline ADL function  Description: INTERVENTIONS:  -  Assess patient's ability to carry out ADLs; assess patient's baseline for ADL function and identify physical deficits which impact ability to perform ADLs (bathing, care of mouth/teeth, toileting, grooming, dressing, etc.)  - Assess/evaluate cause of self-care deficits   - Assess range of motion  - Assess patient's mobility; develop plan if impaired  - Assess patient's need for assistive devices and provide as appropriate  - Encourage maximum independence but intervene and supervise when necessary  - Involve family in performance of ADLs  - Assess for home care needs following discharge   - Consider OT consult to assist with ADL evaluation and planning for discharge  - Provide patient education as appropriate  Outcome: Progressing  Goal: Maintains/Returns to pre admission functional level  Description: INTERVENTIONS:  - Perform AM-PAC 6 Click Basic Mobility/ Daily Activity assessment daily.  - Set and communicate daily mobility goal to care team and patient/family/caregiver.   - Collaborate with rehabilitation services on mobility goals if consulted  - Perform Range of Motion 3 times a day.  - Reposition  patient every 2 hours.  - Dangle patient 3 times a day  - Stand patient 3 times a day  - Ambulate patient 3 times a day  - Out of bed to chair 3 times a day   - Out of bed for meals 3 times a day  - Out of bed for toileting  - Record patient progress and toleration of activity level   Outcome: Progressing

## 2024-01-03 NOTE — QUICK NOTE
Patient's daughter updated at bedside by Dr. Anand. All questions/concerns were answered and addressed.

## 2024-01-03 NOTE — CONSULTS
[facility]  ICU Acceptance Note  Name: Dionne Zuluaga 89 y.o. female I MRN: [id number]  Unit/Bed#: ICU 12-01 I Date of Admission: 1/1/2024   Date of Service: 1/2/2024 I Hospital Day: 1    Consults: Upgraded to ICU for respiratory failure    Assessment/Plan   * Acute respiratory failure with hypoxia and hypercapnia (HCC)  Assessment & Plan  Admitted on 01/01 for progressive SOB x6D - transferred to ICU today for respiratory distress  Suspect this is 2/2 pneumonia w/likely COPD exacerbation though no formal COPD diagnosis per chart review  Initial CT imaging revealed no PE, however did reveal attenuation of multiple small airways in right lower lobe which could be related to infectious or inflammatory bronchitis, aspiration, and/or mucous plugging  COVID/FLU/RSV negative  Urine strep/legionella negative  Will send sputum culture now  Currently on IV Solu Medrol 60 mg Q6H - would continue for now  Continue scheduled Xopenex/Atrovent nebs - will add HTS nebs  Will expand antibiotics to include IV Cefepime/Vancomycin/Azithromycin   Aggressive pulmonary hygiene  After GOC discussion w/family and patient at bedside, patient would like to try BIPAP rather than be intubated at this time and is aware that BIPAP may further push secretions into airways - she has decided that if she does not improve, she would, then, be agreeable to intubation    Severe sepsis (HCC)  Assessment & Plan  Met sepsis criteria on admission - now meeting severe sepsis given new need for BIPAP  In setting of pneumonia as noted above  Blood cultures pending  UA w/out evidence of infection  Urine strep/legionella negative  Will send sputum culture now if able to obtain  Lactic acid 0.9  Procalcitonin 0.11 - 0.22  Holding on IVF given tenuous respiratory status  Will expand antibiotics to IV Cefepime/Vancomycin/Azithromycin as noted above  Monitor fever and WBC curve  Trend procalcitonin and follow-up cutlures    Hypertensive  emergency  Assessment & Plan  BP in 200s/80s on arrival to ICU  Suspect this is 2/2 respiratory distress given acute respiratory failure as noted above  Currently NPO given BIPAP - will plan for PRN IV Hydralazine, however if respiratory status does not improve, will likely require intubation   Holding home Lisinopril for now while NPO  Monitor hemodynamics closely    Elevated d-dimer  Assessment & Plan  Imaging negative for PE  Check bilateral LE venous duplexes  Possibly elevated in setting of severe CAP    Hyponatremia  Assessment & Plan  Possibly in setting of decreased PO intake vs SIADH related to pneumonia vs outpatient HCTZ use  Currently NPO - holding on IVF  Continue to trend closely - plan for no more than 6-8 mEq change over 24H  Frequent neuro checks    Type 2 diabetes mellitus with stage 3 chronic kidney disease, without long-term current use of insulin ([facility])  Assessment & Plan  Lab Results   Component Value Date    HGBA1C 6.8 (H) 01/01/2024       Recent Labs     01/01/24  2207 01/02/24  0710 01/02/24  1230 01/02/24  1743   POCGLU 261* 153* 140 179*       Blood Sugar Average: Last 72 hrs:  (P) 183.25    Does not appear to take antihyperglycemic agents as outpatient  Continue SSI w/fingersticks Q6H  Goal  - 180    Tobacco use disorder  Assessment & Plan  NRT  Encourage cessation when appropriate    Mixed hyperlipidemia  Assessment & Plan  Holding home statin while NPO    Depression, major, in remission ([facility])  Assessment & Plan  Holding home Zoloft while NPO    CKD stage G3b/A1, GFR 30-44 and albumin creatinine ratio <30 mg/g ([facility])  Assessment & Plan  Lab Results   Component Value Date    EGFR 36 01/02/2024    EGFR 46 01/02/2024    EGFR 43 01/01/2024    CREATININE 1.27 01/02/2024    CREATININE 1.05 01/02/2024    CREATININE 1.11 01/01/2024     Baseline creatinine appears to be around 1.4 - 1.9  Avoid nephrotoxic agents and hypotension  Trend renal indices  Strict I/O   Daily weights            History of Present Illness     HPI: Dionne Zuluaga is a 89 y.o. female w/PMHx of HTN, HLD, CKD3, depression, DM2, tobacco abuse who initially presented on 01/01 w/progressive SOB x6D. Patient was admitted to [facility] for sepsis related to pneumonia and was only requiring 2L NC at that time. She was started on IV antibiotics for CAP as well as IV steroids for possible COPD exacerbation (does not have formal COPD diagnosis, however has smoked for many years). This AM, patient had escalating O2 requirements and was initially upgraded to SD2. Upon arrival to ICU, patient was found to be in respiratory distress. GOC were discussed with family and patient at bedside. Ultimately, patient decided to try BIPAP in hopes she could avoid breathing tube, however was aware that BIPAP could potentially lead to worsening secretion burden as well as inability to expectorate. Patient has now been upgraded to ICU for airway management and further work-up.     History obtained from chart review and the patient.  Review of Systems   Constitutional:  Positive for fatigue. Negative for chills and fever.   HENT:  Positive for congestion.    Eyes:  Negative for visual disturbance.   Respiratory:  Positive for cough, chest tightness, shortness of breath and wheezing.    Cardiovascular:  Negative for chest pain and leg swelling.   Gastrointestinal:  Negative for abdominal distention, abdominal pain, nausea and vomiting.   Genitourinary:  Negative for difficulty urinating.   Musculoskeletal:  Negative for back pain and neck pain.   Neurological:  Negative for dizziness and headaches.   Psychiatric/Behavioral:  Negative for confusion.      Disposition: Critical care   Historical Information   Past Medical History:  No date: Hyperlipidemia  No date: Hypertension Past Surgical History:  1974: HYSTERECTOMY   Current Outpatient Medications   Medication Instructions    calcium citrate-vitamin D (CITRACAL+D) 315-200 MG-UNIT per tablet 1 tablet, Oral,  2 times daily    diphenhydrAMINE-acetaminophen (TYLENOL PM)  MG TABS 1 tablet, Oral, Daily at bedtime PRN    fish oil 1,000 mg, Oral, Daily    lisinopril-hydrochlorothiazide (PRINZIDE,ZESTORETIC) 20-12.5 MG per tablet No dose, route, or frequency recorded.    Multiple Vitamin (MULTIVITAMIN) tablet 1 tablet, Oral, Daily    predniSONE 10 mg tablet 3 tabs daily for 3 days, 2 tabs daily for 3 days, 1 tab daily for 3 days    sertraline (ZOLOFT) 25 mg tablet No dose, route, or frequency recorded.    simvastatin (ZOCOR) 40 mg tablet No dose, route, or frequency recorded.    vitamin E (tocopherol) 1,000 Units, Oral, Daily    Allergies   Allergen Reactions    Alcohol - Food Allergy Hives      Social History     Tobacco Use    Smoking status: Every Day     Current packs/day: 0.50     Types: Cigarettes    Smokeless tobacco: Never   Substance Use Topics    Alcohol use: Not Currently    Drug use: Never    History reviewed. No pertinent family history.     Objective                            Vitals I/O      Most Recent Min/Max in 24hrs   Temp 97.9 °F (36.6 °C) Temp  Min: 97.8 °F (36.6 °C)  Max: 99.5 °F (37.5 °C)   Pulse 63 Pulse  Min: 61  Max: 118   Resp 18 Resp  Min: 18  Max: 50   /56 BP  Min: 75/39  Max: 203/85   O2 Sat 96 % SpO2  Min: 82 %  Max: 98 %      Intake/Output Summary (Last 24 hours) at 1/2/2024 1906  Last data filed at 1/2/2024 1700  Gross per 24 hour   Intake 103.18 ml   Output 500 ml   Net -396.82 ml       Diet Enteral/Parenteral; Tube Feeding No Oral Diet; Jevity 1.2 Sebastián; Continuous; 45; 50; Water; Every 6 hours    Invasive Monitoring           Physical Exam   Physical Exam  Vitals and nursing note reviewed.   Eyes:      Extraocular Movements: Extraocular movements intact.      Pupils: Pupils are equal, round, and reactive to light.   Skin:     General: Skin is warm and dry.      Capillary Refill: Capillary refill takes less than 2 seconds.   HENT:      Head: Normocephalic.      Mouth/Throat:       Mouth: Mucous membranes are dry.   Cardiovascular:      Rate and Rhythm: Normal rate and regular rhythm.      Pulses: Normal pulses.      Heart sounds: Normal heart sounds.   Musculoskeletal:      Right lower leg: No edema.      Left lower leg: No edema.   Abdominal: General: Bowel sounds are normal. There is no distension.      Palpations: Abdomen is soft.      Tenderness: There is no abdominal tenderness.   Constitutional:       General: She is awake. She is in acute distress.      Appearance: She is well-developed. She is ill-appearing.      Interventions: Face mask in place.      Comments: Placed on BIPAP   Pulmonary:      Effort: Tachypnea, accessory muscle usage and accessory muscle usage present.      Breath sounds: Decreased breath sounds, wheezing and rhonchi present.   Psychiatric:         Behavior: Behavior is cooperative.   Neurological:      General: No focal deficit present.      Mental Status: She is alert and oriented to person, place and time.            Diagnostic Studies      EKG: sinus rhythm  Imaging:  I have personally reviewed pertinent reports.   and I have personally reviewed pertinent films in PACS     Medications:  Scheduled PRN   azithromycin, 500 mg, Q24H  cefepime, 2,000 mg, Q12H  chlorhexidine, 15 mL, Q12H RUTH  fentanyl citrate (PF), 100 mcg, Once  heparin (porcine), 5,000 Units, Q8H RUTH  insulin lispro, 1-6 Units, Q6H RUTH  ipratropium, 0.5 mg, TID  levalbuterol, 1.25 mg, TID  methylPREDNISolone sodium succinate, ,   methylPREDNISolone sodium succinate, 60 mg, Q6H RUTH  midazolam, 2 mg, Once  sodium chloride, 4 mL, TID  [START ON 1/3/2024] vancomycin, 17.5 mg/kg (Adjusted), Q24H  vancomycin, 25 mg/kg (Adjusted), Once      acetaminophen, 650 mg, Q6H PRN  benzonatate, 100 mg, TID PRN  dextromethorphan-guaiFENesin, 10 mL, Q4H PRN  fentanyl citrate (PF), 50 mcg, Q1H PRN  methylPREDNISolone sodium succinate, ,   ondansetron, 4 mg, Q6H PRN       Continuous    fentaNYL, 75 mcg/hr, Last Rate:  75 mcg/hr (01/02/24 1700)  norepinephrine, 1-30 mcg/min, Last Rate: 2 mcg/min (01/02/24 1733)  propofol, 5-50 mcg/kg/min, Last Rate: 40 mcg/kg/min (01/02/24 1700)         Labs:    CBC    Recent Labs     01/01/24  1643 01/02/24  0238 01/02/24  1458   WBC 17.03* 18.00*  --    HGB 13.7 12.3 11.2*   HCT 41.6 37.0 33*    234  --    BANDSPCT  --  5  --      BMP    Recent Labs     01/02/24  0238 01/02/24  1458 01/02/24  1655   SODIUM 120*  --  123*   K 3.7  --  4.0   CL 87*  --  89*   CO2 25 29 23   AGAP 8  --  11   BUN 23  --  26*   CREATININE 1.05  --  1.27   CALCIUM 8.9  --  8.7       Coags    Recent Labs     01/01/24  1643   INR 1.06   PTT 29        Additional Electrolytes  Recent Labs     01/02/24  0238 01/02/24  1458 01/02/24  1655   MG 1.8*  --  1.9   PHOS 3.2  --  3.7   CAIONIZED  --  1.22 1.11*          Blood Gas    No recent results  Recent Labs     01/02/24  1131   PHVEN 7.307   [id number] 51.6*   PO2VEN 39.9   QWS7ICB 25.2   BEVEN -1.7   [id number] 72.4    LFTs  Recent Labs     01/01/24  1643 01/02/24  0238   ALT 23 49   AST 29 51*   ALKPHOS 61 50   ALB 4.0 3.6   TBILI 0.57 0.30       Infectious  Recent Labs     01/01/24  1719 01/02/24  0238   PROCALCITONI 0.11 0.22     Glucose  Recent Labs     01/01/24  1643 01/02/24  0238 01/02/24  1655   GLUC 152* 178* 181*             Critical Care Time Statement: Upon my evaluation, this patient had a high probability of imminent or life-threatening deterioration due to acute respiratory failure, pneumonia, severe sepsis, hyponatremia, which required my direct attention, intervention, and personal management.  I spent a total of 46 minutes directly providing critical care services, including interpretation of complex medical databases, evaluating for the presence of life-threatening injuries or illnesses, management of organ system failure(s) , and complex medical decision making (to support/prevent further life-threatening deterioration).. This time is exclusive of  procedures, teaching, family meetings, and any prior time recorded by providers other than myself.    TARUN Pickett

## 2024-01-03 NOTE — PROGRESS NOTES
Dionne Zuluaga is a 89 y.o. female who is currently ordered Vancomycin IV with management by the Pharmacy Consult service.  Relevant clinical data and objective / subjective history reviewed.  Vancomycin Assessment:  Indication and Goal AUC/Trough: Pneumonia (goal -600, trough >10)  Clinical Status: stable  Micro:     Renal Function:  SCr: 1.3 mg/dL  CrCl: 29.7 mL/min  Renal replacement: Not on dialysis  Days of Therapy: 2  Current Dose: 1250 mg iv q 24 hrs  Vancomycin Plan:  New Dosin mg iv q 24 hrs  Estimated AUC: 517 mcg*hr/mL  Estimated Trough: 17.3 mcg/mL  Next Level: 24 @ 0600  Renal Function Monitoring: Daily BMP and UOP  Pharmacy will continue to follow closely for s/sx of nephrotoxicity, infusion reactions and appropriateness of therapy.  BMP and CBC will be ordered per protocol. We will continue to follow the patient’s culture results and clinical progress daily.    Amanda Farmer, Pharmacist

## 2024-01-03 NOTE — NURSING NOTE
0800: During morning assessment patient resting in bed, daughter at bedside. Patient unable to follow commands.Will attempt Sedation wean to assess Neuro status.    0850: Patient opening eyes and able to follow commands. Patient having coughing fit. Discussed with Critical Care AP and will suspend wean at this time.    1200: CC AP aware of low urine out, BMP ordered for 1600

## 2024-01-03 NOTE — ASSESSMENT & PLAN NOTE
Imaging negative for PE  Check bilateral LE venous duplexes  Possibly elevated in setting of severe CAP

## 2024-01-03 NOTE — CASE MANAGEMENT
Case Management Discharge Planning Note    Patient name Dionne Zuluaga  Location ICU 12/ICU  MRN [id number]  : 1931 Date 1/3/2024       Current Admission Date: 2024  Current Admission Diagnosis:Acute respiratory failure with hypoxia and hypercapnia (HCC)   Patient Active Problem List    Diagnosis Date Noted    Acute respiratory failure with hypoxia and hypercapnia (HCC) 2024    Hyponatremia 2024    Severe sepsis (HCC) 2024    Elevated d-dimer 2024    Intrinsic eczema 2016    CKD stage G3b/A1, GFR 30-44 and albumin creatinine ratio <30 mg/g (HCC) 2013    Type 2 diabetes mellitus with stage 3 chronic kidney disease, without long-term current use of insulin (HCC) 10/25/2012    Tobacco use disorder 2012    Depression, major, in remission ([facility]) 2012    Hematuria 2012    Hypertensive emergency 2012    Mixed hyperlipidemia 10/31/2011      LOS (days): 2  Geometric Mean LOS (GMLOS) (days): 5.1  Days to GMLOS:3.3     OBJECTIVE:  Risk of Unplanned Readmission Score: 15.94         Current admission status: Inpatient   Preferred Pharmacy:   [id number] - [place], [place] - [address]  [address] [address]  Phone: [phone] Fax: [phone]    [id number] - [place], [place] - [address]  [address] [address]  Phone: [phone] Fax: [phone]    Primary Care Provider: Megan Barros DO    Primary Insurance: MEDICARE  Secondary Insurance: [facility]    DISCHARGE DETAILS:       Additional Comments: Pt intubated and restrained at present. Message left for daughter Cheri Mendez [phone] to complete initial assessment. CM to follow.

## 2024-01-03 NOTE — SEPSIS NOTE
"  Sepsis Note   Dionne Zuluaga 89 y.o. female MRN: [id number]  Unit/Bed#: ICU 12-01 Encounter: [id number]       Initial Sepsis Screening       Row Name 01/02/24 1906 01/02/24 1131             Is the patient's history suggestive of a new or worsening infection? No  No worsening infection  -MR Yes (Proceed)  -BA       Suspected source of infection -- pneumonia  -BA       Indicate SIRS criteria -- Tachypnea > 20 resp per min;Leukocytosis (WBC > 15250 IJL) OR Leukopenia (WBC <4000 IJL) OR Bandemia (WBC >10% bands)  -BA       Are two or more of the above signs & symptoms of infection both present and new to the patient? -- Yes (Proceed)  -BA       Assess for evidence of organ dysfunction: Are any of the below criteria present within 6 hours of suspected infection and SIRS criteria that are NOT considered to be chronic conditions? -- New need for invasive/non-invasive ventilation  -BA       Date of presentation of severe sepsis -- 01/03/24  -BA       Time of presentation of severe sepsis -- 1131  -BA       Sepsis Note: Click \"NEXT\" below (NOT \"close\") to generate sepsis note based on above information. -- --                 User Key  (r) = Recorded By, (t) = Taken By, (c) = Cosigned By      Initials Name Provider Type     Migue Dempsey PA-C Physician Assistant    TARUN Cunningham Nurse Practitioner                        Body mass index is 36.71 kg/m².  Wt Readings from Last 1 Encounters:   01/03/24 94 kg (207 lb 3.7 oz)        Ideal body weight: 52.4 kg (115 lb 8.3 oz)  Adjusted ideal body weight: 69 kg (152 lb 3.3 oz)    Blood cultures pending. Lactic acid being sent. Will expand antibiotics to include IV Cefepime/Vancomycin/Azithromcyin. Hold on IVF given tenuous respiratory status.  "

## 2024-01-03 NOTE — RESPIRATORY THERAPY NOTE
RT Ventilator Management Note  Dionne Zuluaga 89 y.o. female MRN: [id number]  Unit/Bed#: ICU 12-01 Encounter: [id number]      Daily Screen    No data found in the last 10 encounters.           Physical Exam:   Assessment Type: During-treatment  General Appearance: Sedated      Resp Comments: (P) no changes made this shift, pt intubated yesterday, pt remains stable on current settings

## 2024-01-03 NOTE — ASSESSMENT & PLAN NOTE
Admitted on 01/01 for progressive SOB x6D - transferred to ICU today for respiratory distress  Suspect this is 2/2 pneumonia w/likely COPD exacerbation though no formal COPD diagnosis per chart review  Initial CT imaging revealed no PE, however did reveal attenuation of multiple small airways in right lower lobe which could be related to infectious or inflammatory bronchitis, aspiration, and/or mucous plugging  COVID/FLU/RSV negative  Urine strep/legionella negative  Sputum Culture Pending  Currently on IV Solu Medrol 60 mg Q6H  Continue scheduled Xopenex/Atrovent HTS nebs  Continue IV Cefepime/Vancomycin/Azithromycin   Aggressive pulmonary hygiene  After GOC discussion w/family and patient at bedside, patient tried BIPAP rather than intubaation, but failed and thus  be agreed to intubation trial but would not want long term vent requirement

## 2024-01-03 NOTE — ASSESSMENT & PLAN NOTE
Met sepsis criteria on admission - now meeting severe sepsis given new need for BIPAP  In setting of pneumonia as noted above  Blood cultures NGTD  UA w/out evidence of infection  Urine strep/legionella negative  Will send sputum culture Pending  Lactic acid 0.9  Procalcitonin 0.11 - 0.22  Holding on IVF given tenuous respiratory status  Continues on IV Cefepime/Vancomycin/Azithromycin as noted above  Monitor fever and WBC curve  Trend procalcitonin and follow-up cutlures

## 2024-01-03 NOTE — ASSESSMENT & PLAN NOTE
Lab Results   Component Value Date    HGBA1C 6.8 (H) 01/01/2024       Recent Labs     01/02/24  0710 01/02/24  1230 01/02/24  1743 01/02/24  2357   POCGLU 153* 140 179* 177*         Blood Sugar Average: Last 72 hrs:  (P) 182    Does not appear to take antihyperglycemic agents as outpatient  Continue SSI w/fingersticks Q6H  Goal  - 180

## 2024-01-04 LAB
ALBUMIN SERPL BCP-MCNC: 3.6 G/DL (ref 3.5–5)
ALP SERPL-CCNC: 51 U/L (ref 34–104)
ALT SERPL W P-5'-P-CCNC: 53 U/L (ref 7–52)
ANION GAP SERPL CALCULATED.3IONS-SCNC: 9 MMOL/L
AST SERPL W P-5'-P-CCNC: 28 U/L (ref 13–39)
BASOPHILS # BLD AUTO: 0.02 THOUSANDS/ÂΜL (ref 0–0.1)
BASOPHILS NFR BLD AUTO: 0 % (ref 0–1)
BILIRUB SERPL-MCNC: 0.4 MG/DL (ref 0.2–1)
BUN SERPL-MCNC: 44 MG/DL (ref 5–25)
CA-I BLD-SCNC: 1.17 MMOL/L (ref 1.12–1.32)
CALCIUM SERPL-MCNC: 8.6 MG/DL (ref 8.4–10.2)
CHLORIDE SERPL-SCNC: 93 MMOL/L (ref 96–108)
CO2 SERPL-SCNC: 23 MMOL/L (ref 21–32)
CREAT SERPL-MCNC: 1.59 MG/DL (ref 0.6–1.3)
EOSINOPHIL # BLD AUTO: 0 THOUSAND/ÂΜL (ref 0–0.61)
EOSINOPHIL NFR BLD AUTO: 0 % (ref 0–6)
ERYTHROCYTE [DISTWIDTH] IN BLOOD BY AUTOMATED COUNT: 13.6 % (ref 11.6–15.1)
GFR SERPL CREATININE-BSD FRML MDRD: 27 ML/MIN/1.73SQ M
GLUCOSE SERPL-MCNC: 141 MG/DL (ref 65–140)
GLUCOSE SERPL-MCNC: 162 MG/DL (ref 65–140)
GLUCOSE SERPL-MCNC: 176 MG/DL (ref 65–140)
GLUCOSE SERPL-MCNC: 205 MG/DL (ref 65–140)
GLUCOSE SERPL-MCNC: 217 MG/DL (ref 65–140)
GLUCOSE SERPL-MCNC: 220 MG/DL (ref 65–140)
HCT VFR BLD AUTO: 33.1 % (ref 34.8–46.1)
HGB BLD-MCNC: 10.7 G/DL (ref 11.5–15.4)
IMM GRANULOCYTES # BLD AUTO: 0.38 THOUSAND/UL (ref 0–0.2)
IMM GRANULOCYTES NFR BLD AUTO: 2 % (ref 0–2)
LYMPHOCYTES # BLD AUTO: 1.03 THOUSANDS/ÂΜL (ref 0.6–4.47)
LYMPHOCYTES NFR BLD AUTO: 5 % (ref 14–44)
MAGNESIUM SERPL-MCNC: 2.4 MG/DL (ref 1.9–2.7)
MCH RBC QN AUTO: 30.3 PG (ref 26.8–34.3)
MCHC RBC AUTO-ENTMCNC: 32.3 G/DL (ref 31.4–37.4)
MCV RBC AUTO: 94 FL (ref 82–98)
MONOCYTES # BLD AUTO: 1.25 THOUSAND/ÂΜL (ref 0.17–1.22)
MONOCYTES NFR BLD AUTO: 6 % (ref 4–12)
MRSA NOSE QL CULT: NORMAL
NEUTROPHILS # BLD AUTO: 17.26 THOUSANDS/ÂΜL (ref 1.85–7.62)
NEUTS SEG NFR BLD AUTO: 87 % (ref 43–75)
NRBC BLD AUTO-RTO: 0 /100 WBCS
PHOSPHATE SERPL-MCNC: 3.6 MG/DL (ref 2.3–4.1)
PLATELET # BLD AUTO: 263 THOUSANDS/UL (ref 149–390)
PMV BLD AUTO: 9.6 FL (ref 8.9–12.7)
POTASSIUM SERPL-SCNC: 4.5 MMOL/L (ref 3.5–5.3)
PROCALCITONIN SERPL-MCNC: 0.29 NG/ML
PROT SERPL-MCNC: 6.3 G/DL (ref 6.4–8.4)
RBC # BLD AUTO: 3.53 MILLION/UL (ref 3.81–5.12)
SODIUM SERPL-SCNC: 125 MMOL/L (ref 135–147)
WBC # BLD AUTO: 19.94 THOUSAND/UL (ref 4.31–10.16)

## 2024-01-04 PROCEDURE — 94664 DEMO&/EVAL PT USE INHALER: CPT

## 2024-01-04 PROCEDURE — 82948 REAGENT STRIP/BLOOD GLUCOSE: CPT

## 2024-01-04 PROCEDURE — 83735 ASSAY OF MAGNESIUM: CPT | Performed by: NURSE PRACTITIONER

## 2024-01-04 PROCEDURE — 92610 EVALUATE SWALLOWING FUNCTION: CPT

## 2024-01-04 PROCEDURE — 99233 SBSQ HOSP IP/OBS HIGH 50: CPT | Performed by: PHYSICIAN ASSISTANT

## 2024-01-04 PROCEDURE — 84100 ASSAY OF PHOSPHORUS: CPT | Performed by: NURSE PRACTITIONER

## 2024-01-04 PROCEDURE — 80053 COMPREHEN METABOLIC PANEL: CPT | Performed by: NURSE PRACTITIONER

## 2024-01-04 PROCEDURE — 85025 COMPLETE CBC W/AUTO DIFF WBC: CPT | Performed by: NURSE PRACTITIONER

## 2024-01-04 PROCEDURE — 94003 VENT MGMT INPAT SUBQ DAY: CPT

## 2024-01-04 PROCEDURE — C9113 INJ PANTOPRAZOLE SODIUM, VIA: HCPCS

## 2024-01-04 PROCEDURE — 82330 ASSAY OF CALCIUM: CPT | Performed by: NURSE PRACTITIONER

## 2024-01-04 PROCEDURE — 94760 N-INVAS EAR/PLS OXIMETRY 1: CPT

## 2024-01-04 PROCEDURE — 84145 PROCALCITONIN (PCT): CPT | Performed by: NURSE PRACTITIONER

## 2024-01-04 PROCEDURE — 94669 MECHANICAL CHEST WALL OSCILL: CPT

## 2024-01-04 PROCEDURE — 94640 AIRWAY INHALATION TREATMENT: CPT

## 2024-01-04 RX ORDER — METHYLPREDNISOLONE SODIUM SUCCINATE 125 MG/2ML
60 INJECTION, POWDER, LYOPHILIZED, FOR SOLUTION INTRAMUSCULAR; INTRAVENOUS EVERY 12 HOURS SCHEDULED
Status: DISCONTINUED | OUTPATIENT
Start: 2024-01-04 | End: 2024-01-08

## 2024-01-04 RX ORDER — VANCOMYCIN HYDROCHLORIDE 750 MG/150ML
750 INJECTION, SOLUTION INTRAVENOUS EVERY 24 HOURS
Status: DISCONTINUED | OUTPATIENT
Start: 2024-01-04 | End: 2024-01-05

## 2024-01-04 RX ORDER — PANTOPRAZOLE SODIUM 40 MG/10ML
40 INJECTION, POWDER, LYOPHILIZED, FOR SOLUTION INTRAVENOUS
Status: DISCONTINUED | OUTPATIENT
Start: 2024-01-04 | End: 2024-01-08 | Stop reason: HOSPADM

## 2024-01-04 RX ADMIN — PROPOFOL 30 MCG/KG/MIN: 10 INJECTION, EMULSION INTRAVENOUS at 05:56

## 2024-01-04 RX ADMIN — HEPARIN SODIUM 5000 UNITS: 5000 INJECTION INTRAVENOUS; SUBCUTANEOUS at 21:28

## 2024-01-04 RX ADMIN — SODIUM CHLORIDE SOLN NEBU 3% 4 ML: 3 NEBU SOLN at 08:12

## 2024-01-04 RX ADMIN — SODIUM CHLORIDE SOLN NEBU 3% 4 ML: 3 NEBU SOLN at 19:33

## 2024-01-04 RX ADMIN — VANCOMYCIN HYDROCHLORIDE 750 MG: 1 INJECTION, SOLUTION INTRAVENOUS at 19:56

## 2024-01-04 RX ADMIN — IPRATROPIUM BROMIDE 0.5 MG: 0.5 SOLUTION RESPIRATORY (INHALATION) at 19:33

## 2024-01-04 RX ADMIN — LEVALBUTEROL HYDROCHLORIDE 1.25 MG: 1.25 SOLUTION RESPIRATORY (INHALATION) at 19:33

## 2024-01-04 RX ADMIN — PANTOPRAZOLE SODIUM 40 MG: 40 INJECTION, POWDER, FOR SOLUTION INTRAVENOUS at 08:25

## 2024-01-04 RX ADMIN — CEFEPIME HYDROCHLORIDE 2000 MG: 2 INJECTION, SOLUTION INTRAVENOUS at 21:29

## 2024-01-04 RX ADMIN — INSULIN LISPRO 2 UNITS: 100 INJECTION, SOLUTION INTRAVENOUS; SUBCUTANEOUS at 11:59

## 2024-01-04 RX ADMIN — Medication 75 MCG/HR: at 01:33

## 2024-01-04 RX ADMIN — IPRATROPIUM BROMIDE 0.5 MG: 0.5 SOLUTION RESPIRATORY (INHALATION) at 08:12

## 2024-01-04 RX ADMIN — METHYLPREDNISOLONE SODIUM SUCCINATE 60 MG: 125 INJECTION, POWDER, FOR SOLUTION INTRAMUSCULAR; INTRAVENOUS at 05:56

## 2024-01-04 RX ADMIN — CEFEPIME HYDROCHLORIDE 2000 MG: 2 INJECTION, SOLUTION INTRAVENOUS at 08:30

## 2024-01-04 RX ADMIN — HEPARIN SODIUM 5000 UNITS: 5000 INJECTION INTRAVENOUS; SUBCUTANEOUS at 14:21

## 2024-01-04 RX ADMIN — METHYLPREDNISOLONE SODIUM SUCCINATE 60 MG: 125 INJECTION, POWDER, FOR SOLUTION INTRAMUSCULAR; INTRAVENOUS at 21:28

## 2024-01-04 RX ADMIN — INSULIN LISPRO 4 UNITS: 100 INJECTION, SOLUTION INTRAVENOUS; SUBCUTANEOUS at 01:06

## 2024-01-04 RX ADMIN — LEVALBUTEROL HYDROCHLORIDE 1.25 MG: 1.25 SOLUTION RESPIRATORY (INHALATION) at 14:53

## 2024-01-04 RX ADMIN — HEPARIN SODIUM 5000 UNITS: 5000 INJECTION INTRAVENOUS; SUBCUTANEOUS at 05:56

## 2024-01-04 RX ADMIN — CHLORHEXIDINE GLUCONATE, 0.12% ORAL RINSE 15 ML: 1.2 SOLUTION DENTAL at 08:25

## 2024-01-04 RX ADMIN — CHLORHEXIDINE GLUCONATE, 0.12% ORAL RINSE 15 ML: 1.2 SOLUTION DENTAL at 21:29

## 2024-01-04 RX ADMIN — AZITHROMYCIN MONOHYDRATE 500 MG: 500 INJECTION, POWDER, LYOPHILIZED, FOR SOLUTION INTRAVENOUS at 07:20

## 2024-01-04 RX ADMIN — INSULIN LISPRO 4 UNITS: 100 INJECTION, SOLUTION INTRAVENOUS; SUBCUTANEOUS at 06:07

## 2024-01-04 RX ADMIN — SODIUM CHLORIDE SOLN NEBU 3% 4 ML: 3 NEBU SOLN at 14:53

## 2024-01-04 RX ADMIN — IPRATROPIUM BROMIDE 0.5 MG: 0.5 SOLUTION RESPIRATORY (INHALATION) at 14:53

## 2024-01-04 RX ADMIN — LEVALBUTEROL HYDROCHLORIDE 1.25 MG: 1.25 SOLUTION RESPIRATORY (INHALATION) at 08:12

## 2024-01-04 NOTE — PLAN OF CARE
Problem: PAIN - ADULT  Goal: Verbalizes/displays adequate comfort level or baseline comfort level  Description: Interventions:  - Encourage patient to monitor pain and request assistance  - Assess pain using appropriate pain scale  - Administer analgesics based on type and severity of pain and evaluate response  - Implement non-pharmacological measures as appropriate and evaluate response  - Consider cultural and social influences on pain and pain management  - Notify physician/advanced practitioner if interventions unsuccessful or patient reports new pain  Outcome: Progressing     Problem: INFECTION - ADULT  Goal: Absence or prevention of progression during hospitalization  Description: INTERVENTIONS:  - Assess and monitor for signs and symptoms of infection  - Monitor lab/diagnostic results  - Monitor all insertion sites, i.e. indwelling lines, tubes, and drains  - Monitor endotracheal if appropriate and nasal secretions for changes in amount and color  - [place] appropriate cooling/warming therapies per order  - Administer medications as ordered  - Instruct and encourage patient and family to use good hand hygiene technique  - Identify and instruct in appropriate isolation precautions for identified infection/condition  Outcome: Progressing     Problem: SAFETY ADULT  Goal: Patient will remain free of falls  Description: INTERVENTIONS:  - Educate patient/family on patient safety including physical limitations  - Instruct patient to call for assistance with activity   - Consult OT/PT to assist with strengthening/mobility   - Keep Call bell within reach  - Keep bed low and locked with side rails adjusted as appropriate  - Keep care items and personal belongings within reach  - Initiate and maintain comfort rounds  - Make Fall Risk Sign visible to staff  - Offer Toileting every 2 Hours, in advance of need  - Initiate/Maintain bed alarm  - Obtain necessary fall risk management equipment:   - Apply yellow socks and  bracelet for high fall risk patients  - Consider moving patient to room near nurses station  Outcome: Progressing  Goal: Maintain or return to baseline ADL function  Description: INTERVENTIONS:  -  Assess patient's ability to carry out ADLs; assess patient's baseline for ADL function and identify physical deficits which impact ability to perform ADLs (bathing, care of mouth/teeth, toileting, grooming, dressing, etc.)  - Assess/evaluate cause of self-care deficits   - Assess range of motion  - Assess patient's mobility; develop plan if impaired  - Assess patient's need for assistive devices and provide as appropriate  - Encourage maximum independence but intervene and supervise when necessary  - Involve family in performance of ADLs  - Assess for home care needs following discharge   - Consider OT consult to assist with ADL evaluation and planning for discharge  - Provide patient education as appropriate  Outcome: Progressing  Goal: Maintains/Returns to pre admission functional level  Description: INTERVENTIONS:  - Perform AM-PAC 6 Click Basic Mobility/ Daily Activity assessment daily.  - Set and communicate daily mobility goal to care team and patient/family/caregiver.   - Collaborate with rehabilitation services on mobility goals if consulted  - Perform Range of Motion 3 times a day.  - Reposition patient every 2 hours.  - Dangle patient 3 times a day  - Stand patient 3 times a day  - Ambulate patient 3 times a day  - Out of bed for meals   - Out of bed for toileting  - Record patient progress and toleration of activity level   Outcome: Progressing     Problem: DISCHARGE PLANNING  Goal: Discharge to home or other facility with appropriate resources  Description: INTERVENTIONS:  - Identify barriers to discharge w/patient and caregiver  - Arrange for needed discharge resources and transportation as appropriate  - Identify discharge learning needs (meds, wound care, etc.)  - Arrange for interpretive services to assist  at discharge as needed  - Refer to Case Management Department for coordinating discharge planning if the patient needs post-hospital services based on physician/advanced practitioner order or complex needs related to functional status, cognitive ability, or social support system  Outcome: Progressing     Problem: Knowledge Deficit  Goal: Patient/family/caregiver demonstrates understanding of disease process, treatment plan, medications, and discharge instructions  Description: Complete learning assessment and assess knowledge base.  Interventions:  - Provide teaching at level of understanding  - Provide teaching via preferred learning methods  Outcome: Progressing     Problem: Nutrition/Hydration-ADULT  Goal: Nutrient/Hydration intake appropriate for improving, restoring or maintaining nutritional needs  Description: Monitor and assess patient's nutrition/hydration status for malnutrition. Collaborate with interdisciplinary team and initiate plan and interventions as ordered.  Monitor patient's weight and dietary intake as ordered or per policy. Utilize nutrition screening tool and intervene as necessary. Determine patient's food preferences and provide high-protein, high-caloric foods as appropriate.     INTERVENTIONS:  - Monitor oral intake, urinary output, labs, and treatment plans  - Assess nutrition and hydration status and recommend course of action  - Evaluate amount of meals eaten  - Assist patient with eating if necessary   - Allow adequate time for meals  - Recommend/ encourage appropriate diets, oral nutritional supplements, and vitamin/mineral supplements  - Order, calculate, and assess calorie counts as needed  - Recommend, monitor, and adjust tube feedings and TPN/PPN based on assessed needs  - Assess need for intravenous fluids  - Provide specific nutrition/hydration education as appropriate  - Include patient/family/caregiver in decisions related to nutrition  Outcome: Progressing     Problem:  Prexisting or High Potential for Compromised Skin Integrity  Goal: Skin integrity is maintained or improved  Description: INTERVENTIONS:  - Identify patients at risk for skin breakdown  - Assess and monitor skin integrity  - Assess and monitor nutrition and hydration status  - Monitor labs   - Assess for incontinence   - Turn and reposition patient  - Assist with mobility/ambulation  - Relieve pressure over bony prominences  - Avoid friction and shearing  - Provide appropriate hygiene as needed including keeping skin clean and dry  - Evaluate need for skin moisturizer/barrier cream  - Collaborate with interdisciplinary team   - Patient/family teaching  - Consider wound care consult   Outcome: Progressing     Problem: SAFETY,RESTRAINT: NV/NON-SELF DESTRUCTIVE BEHAVIOR  Goal: Remains free of harm/injury (restraint for non violent/non self-detsructive behavior)  Description: INTERVENTIONS:  - Instruct patient/family regarding restraint use   - Assess and monitor physiologic and psychological status   - Provide interventions and comfort measures to meet assessed patient needs   - Identify and implement measures to help patient regain control  - Assess readiness for release of restraint   Outcome: Progressing  Goal: Returns to optimal restraint-free functioning  Description: INTERVENTIONS:  - Assess the patient's behavior and symptoms that indicate continued need for restraint  - Identify and implement measures to help patient regain control  - Assess readiness for release of restraint   Outcome: Progressing     Problem: RESPIRATORY - ADULT  Goal: Achieves optimal ventilation and oxygenation  Description: INTERVENTIONS:  - Assess for changes in respiratory status  - Assess for changes in mentation and behavior  - Position to facilitate oxygenation and minimize respiratory effort  - Oxygen administered by appropriate delivery if ordered  - Initiate smoking cessation education as indicated  - Encourage broncho-pulmonary  hygiene including cough, deep breathe, Incentive Spirometry  - Assess the need for suctioning and aspirate as needed  - Assess and instruct to report SOB or any respiratory difficulty  - Respiratory Therapy support as indicated  Outcome: Progressing

## 2024-01-04 NOTE — PROGRESS NOTES
Progress Note - Palliative & Supportive Care  Dionne Zuluaga  89 y.o.  female  ICU 12/ICU 12-01   MRN: [id number]  Encounter: [id number]     Assessment  Present on Admission:   Hypertensive emergency   Type 2 diabetes mellitus with stage 3 chronic kidney disease, without long-term current use of insulin (HCC)   Acute respiratory failure with hypoxia and hypercapnia (HCC)   Hyponatremia   Severe sepsis (HCC)   Elevated d-dimer   CKD stage G3b/A1, GFR 30-44 and albumin creatinine ratio <30 mg/g (HCC)   Mixed hyperlipidemia   Tobacco use disorder   Depression, major, in remission (HCC)     Active issues addressed today:  Acute respiratory failure with hypoxia and hypercapnia  Severe sepsis  Palliative care by specialist  Goals of care counseling    Plan  Symptom management  Defer to primary treatment team  Denies any pain    2.   Goals  Level 1 code status. Full code. Disease focused care.   Does not want to be kept alive indefinitely with mechanical ventilation   Patient agrees to continued trial of intubation  If extubation would fail, patient agrees to re-intubation for a trial period     3.  Social support  Patient is supported by daughters/granddaughter  Supportive listening provided  Normalized experience of patient/family  Provided anxiety containment  Provided anticipatory guidance    4.  Follow up  Palliative Care will continue to follow for ongoing goals of care.     Please reach out to on-call provider via [employer] if questions or concerns arise.      Interval History  Chart reviewed before visit.  Patient intubated x2 days, tolerating well. Reports cough. No pain complaints. Plan for extubation today. Brief GOC discussion with patient and family regarding re-intubation if needed. Pt agrees to re-intubation for trial period.     Current pain location(s): n/a  Pain Scale:   0  PRN Use of Pain Medications: 0      Review of Systems  All other systems negative.     Medications    Current  Facility-Administered Medications:     acetaminophen (TYLENOL) tablet 650 mg, 650 mg, Oral, Q6H PRN, TARUN Soni    azithromycin (ZITHROMAX) 500 mg in sodium chloride 0.9 % 250 mL IVPB, 500 mg, Intravenous, Q24H, TARUN Soni, Last Rate: 250 mL/hr at 01/04/24 0720, 500 mg at 01/04/24 0720    benzonatate (TESSALON PERLES) capsule 100 mg, 100 mg, Oral, TID PRN, TARUN Soni    cefepime (MAXIPIME) IVPB (premix in dextrose) 2,000 mg 50 mL, 2,000 mg, Intravenous, Q12H, TARUN Soni, Last Rate: 100 mL/hr at 01/04/24 0830, 2,000 mg at 01/04/24 0830    chlorhexidine (PERIDEX) 0.12 % oral rinse 15 mL, 15 mL, Mouth/Throat, Q12H RUTH, TARUN Soni, 15 mL at 01/04/24 0825    dextromethorphan-guaiFENesin (ROBITUSSIN DM) oral syrup 10 mL, 10 mL, Oral, Q4H PRN, TARUN Soni, 10 mL at 01/01/24 2237    fentaNYL 1000 mcg in sodium chloride 0.9% 100mL infusion, 75 mcg/hr, Intravenous, Continuous, TARUN Soni, Last Rate: 7.5 mL/hr at 01/04/24 0400, 75 mcg/hr at 01/04/24 0400    fentanyl citrate (PF) 100 MCG/2ML 50 mcg, 50 mcg, Intravenous, Q1H PRN, TARUN Soni, 50 mcg at 01/03/24 0856    heparin (porcine) subcutaneous injection 5,000 Units, 5,000 Units, Subcutaneous, Q8H RUTH, TARUN Soni, 5,000 Units at 01/04/24 0556    insulin lispro (HumaLOG) 100 units/mL subcutaneous injection 2-12 Units, 2-12 Units, Subcutaneous, Q6H RUTH, 4 Units at 01/04/24 0607 **AND** Fingerstick Glucose (POCT), , , Q6H, TARUN Soni    ipratropium (ATROVENT) 0.02 % inhalation solution 0.5 mg, 0.5 mg, Nebulization, TID, TARUN Soni, 0.5 mg at 01/04/24 0812    levalbuterol (XOPENEX) inhalation solution 1.25 mg, 1.25 mg, Nebulization, TID, TARUN Soni, 1.25 mg at 01/04/24 0812    methylPREDNISolone sodium succinate (Solu-MEDROL) injection 60 mg, 60 mg, Intravenous, Q6H RUTH, TARUN Soni, 60 mg at 01/04/24  "0556    NOREPINEPHRINE 4 MG  ML NSS (CMPD ORDER) infusion, 1-30 mcg/min, Intravenous, Titrated, Amadeo Anand DO, Stopped at 01/04/24 0030    ondansetron (ZOFRAN) injection 4 mg, 4 mg, Intravenous, Q6H PRN, TARUN Soni    pantoprazole (PROTONIX) injection 40 mg, 40 mg, Intravenous, Q24H RUTH, TARUN Mckeon, 40 mg at 01/04/24 0825    propofol (DIPRIVAN) 1000 mg in 100 mL infusion (premix), 5-50 mcg/kg/min, Intravenous, Titrated, TARUN Soni, Stopped at 01/04/24 0830    senna-docusate sodium (SENOKOT S) 8.6-50 mg per tablet 2 tablet, 2 tablet, Oral, HS, TARUN Soni, 2 tablet at 01/03/24 2133    sodium chloride 3 % inhalation solution 4 mL, 4 mL, Nebulization, TID, TARUN Soni, 4 mL at 01/04/24 0812    vancomycin (VANCOCIN) IVPB (premix in dextrose) 1,000 mg 200 mL, 1,000 mg, Intravenous, Q24H, TARUN Soni, Last Rate: 200 mL/hr at 01/04/24 0000, Rate Verify at 01/04/24 0000    Objective  /59   Pulse 81   Temp 98.1 °F (36.7 °C)   Resp (!) 23   Ht 5' 3\" (1.6 m)   Wt 94 kg (207 lb 3.7 oz)   SpO2 95%   BMI 36.71 kg/m²   Physical Exam  Vitals and nursing note reviewed.   Constitutional:       General: She is not in acute distress.     Appearance: She is well-developed.      Interventions: She is intubated.   HENT:      Head: Normocephalic and atraumatic.      Right Ear: External ear normal.      Left Ear: External ear normal.      Nose: Nose normal.      Mouth/Throat:      Pharynx: Oropharynx is clear.   Eyes:      Conjunctiva/sclera: Conjunctivae normal.   Cardiovascular:      Rate and Rhythm: Normal rate.   Pulmonary:      Effort: Pulmonary effort is normal. No respiratory distress. She is intubated.      Comments: cough  Abdominal:      General: Abdomen is flat.   Musculoskeletal:         General: No deformity.      Cervical back: Normal range of motion.   Skin:     General: Skin is warm and dry.      Capillary Refill: Capillary " refill takes less than 2 seconds.   Neurological:      Mental Status: She is alert.   Psychiatric:         Mood and Affect: Mood normal.       Lab Results: I have personally reviewed pertinent labs., CBC:   Lab Results   Component Value Date    WBC 19.94 (H) 01/04/2024    HGB 10.7 (L) 01/04/2024    HCT 33.1 (L) 01/04/2024    MCV 94 01/04/2024     01/04/2024    RBC 3.53 (L) 01/04/2024    MCH 30.3 01/04/2024    MCHC 32.3 01/04/2024    RDW 13.6 01/04/2024    MPV 9.6 01/04/2024    NRBC 0 01/04/2024   , CMP:   Lab Results   Component Value Date    SODIUM 125 (L) 01/04/2024    K 4.5 01/04/2024    CL 93 (L) 01/04/2024    CO2 23 01/04/2024    BUN 44 (H) 01/04/2024    CREATININE 1.59 (H) 01/04/2024    CALCIUM 8.6 01/04/2024    AST 28 01/04/2024    ALT 53 (H) 01/04/2024    ALKPHOS 51 01/04/2024    EGFR 27 01/04/2024     Imaging Studies: I have personally reviewed pertinent reports.  VAS lower limb venous duplex study, complete bilateral    Result Date: 1/3/2024  Narrative:  [facility] REPORT CLINICAL: Indications: Patient presents with respiratory failure, elevated D-Dimer, shortness of breath and COPD. Assess for DVT. Risk Factors The patient has history of HTN, Diabetes (NIDDM (Diet)) and Hyperlipidemia.   CONCLUSION:  Impression: RIGHT LOWER LIMB: No evidence of acute or chronic deep vein thrombosis. No evidence of superficial thrombophlebitis noted. Doppler evaluation shows a normal response to augmentation maneuvers.. Popliteal, posterior tibial and anterior tibial arterial Doppler waveform's are triphasic/biphasic  LEFT LOWER LIMB: No evidence of acute or chronic deep vein thrombosis. No evidence of superficial thrombophlebitis noted. Doppler evaluation shows a normal response to augmentation maneuvers. Popliteal, posterior tibial and anterior tibial arterial Doppler waveform's are triphasic/biphasic.  SIGNATURE: Electronically Signed by: DIXON CH MD on 2024-01-03 12:44:37 PM    XR chest portable  ICU    Result Date: 1/3/2024  Narrative: CHEST INDICATION:  Intubation. COMPARISON: Chest radiograph and CT chest 1/2/2024 EXAM PERFORMED/VIEWS:  XR CHEST PORTABLE ICU  AP semierect Images: 2 FINDINGS: The patient is rotated to the right. Study limited by patient body habitus and portable technique. Monitoring wires and leads project over the chest. -ET tube tip terminates approximately 3 cm above the dean. -Enteric tube courses to the stomach, distal tip not visualized. Cardiomediastinal silhouette appears stable, noting calcified uncoiled thoracic aorta. Question mild pulmonary venous congestion with small left pleural effusion. Left retrocardiac opacity may represent volume loss. No pneumothorax. Paravertebral ossifications thoracic spine.     Impression: Life-support tubes as above. Question mild pulmonary venous congestion with possible small left pleural effusion and left retrocardiac atelectasis. Workstation performed: [id number]     XR chest portable ICU    Result Date: 1/2/2024  Narrative: CHEST INDICATION:   resp failure. COMPARISON: Chest x-ray and CT thorax previous day EXAM PERFORMED/VIEWS:  XR CHEST PORTABLE ICU FINDINGS: Cardiomediastinal silhouette appears unremarkable. Nonspecific interstitial thickening suggesting developing edema with retrocardiac atelectasis. No pneumothorax or pleural effusion. Osseous structures appear within normal limits for patient age.     Impression: Interstitial edema with development of retrocardiac opacity. Workstation performed: [id number]     XR chest 1 view portable    Result Date: 1/2/2024  Narrative: CHEST INDICATION:   cough, wheezing, hx tobacco. COMPARISON:  None EXAM PERFORMED/VIEWS:  XR CHEST PORTABLE FINDINGS: Normal cardiac silhouette.  Aortic calcification is present. Minimal bibasilar subsegmental atelectasis, right greater than left. No pneumothorax, pulmonary edema, or large pleural effusion. Mild degenerative changes of bilateral shoulders.  Multilevel thoracic spondylosis.     Impression: Minimal bibasilar subsegmental atelectasis. Workstation performed: [id number]     CTA ED chest PE Study    Result Date: 1/1/2024  Narrative: CTA - CHEST WITH IV CONTRAST - PULMONARY ANGIOGRAM INDICATION:   hypoxia, elevated ddimer. COMPARISON: Chest x-ray dated the same TECHNIQUE: CTA examination of the chest was performed using angiographic technique according to a protocol specifically tailored to evaluate for pulmonary embolism.  Multiplanar 2D reformatted images were created from the source data. In addition, coronal 3D MIP postprocessing was performed on the acquisition scanner. Radiation dose length product (DLP) for this visit:  606 mGy-cm .  This examination, like all CT scans performed in the [facility], was performed utilizing techniques to minimize radiation dose exposure, including the use of iterative reconstruction and automated exposure control. IV Contrast:  85 mL of iohexol (OMNIPAQUE) FINDINGS: PULMONARY ARTERIAL TREE: Some images are suboptimal secondary to respiratory motion which decreases sensitivity for evaluation of peripheral pulmonary emboli, subject to this, no central pulmonary embolism is seen. LUNGS:  There is attenuation of multiple small airways in the right lower lobe which could be related to infectious or inflammatory bronchitis, aspiration, and/or mucous plugging. Linear atelectasis in the bilateral lower lung fields. Visualized lungs otherwise appear grossly clear. PLEURA:  Unremarkable. HEART/GREAT VESSELS: Heart appears normal in size. Mild to moderate. Coronary atherosclerosis. Mitral annular calcifications. Mild to moderate aortic atherosclerosis. Saccular aneurysm of the abdominal aorta at the level of the kidneys measuring approximately 0.7 cm in size (axial image 262, series 2 and coronal image 105, series 601). No thoracic aortic aneurysm. MEDIASTINUM AND LIA:  Unremarkable. CHEST WALL AND LOWER NECK:    "Unremarkable. VISUALIZED STRUCTURES IN THE UPPER ABDOMEN: Several renal cysts are noted, largest is exophytic in the anterior left kidney measuring approximately 7 cm in size OSSEOUS STRUCTURES:  No acute fracture or destructive osseous lesion.     Impression: Some images are suboptimal secondary to respiratory motion which decreases sensitivity for evaluation of peripheral pulmonary emboli, subject to this, no central pulmonary embolism is seen. Attenuation of multiple small airways in the right lower lobe which could be related to infectious or inflammatory bronchitis, aspiration, and/or mucous plugging. Correlation with the patient's symptoms and laboratory values recommended Coronary atherosclerosis, aortic atherosclerosis, saccular aneurysm of the abdominal aorta at the level of the kidneys measuring approximately 0.7 cm in size (axial image 262, series 2 and coronal image 105, series 601). Other findings as above. Workstation performed: [id number]        EKG, Pathology, and Other Studies: I have personally reviewed pertinent reports.      Counseling / Coordination of Care  Total floor / unit time spent today 30 minutes. Greater than 50% of total time was spent with the patient and / or family counseling and / or coordinating of care. A description of the counseling / coordination of care: Chart reviewed,  provided medical updates, determined goals of care, discussed palliative care and symptom management,  provided anticipatory guidance, determined competency and POA/HCA, determined social/family support, provided psychosocial support. Reviewed with ICU team.    Ryan Deal PA-C  Palliative and Supportive Care  Clinic/Answering Service: [phone]  You can find me on [employer]!     Portions of this document may have been created using dictation software and as such some \"sound alike\" terms may have been generated by the system. Do not hesitate to contact me with any questions or clarifications.    "

## 2024-01-04 NOTE — RESPIRATORY THERAPY NOTE
RT Ventilator Management Note  Dionne Zuluaga 89 y.o. female MRN: [id number]  Unit/Bed#: ICU 12-01 Encounter: [id number]      Daily Screen         1/3/2024  0724 1/4/2024  0425          Patient safety screen outcome:: Failed Passed (P)       Not Ready for Weaning due to:: Underline problem not resolved --                Physical Exam:   Assessment Type: During-treatment  General Appearance: Sedated  Respiratory Pattern: Assisted  Chest Assessment: Chest expansion symmetrical  Cough: None      Resp Comments: (P) no changes made this shift, pt remains stable on current settings

## 2024-01-04 NOTE — ASSESSMENT & PLAN NOTE
BP in 200s/80s on arrival to ICU  Suspect this is 2/2 respiratory distress given acute respiratory failure as noted above    Plan:  Holding home Lisinopril for now while NPO  Monitor hemodynamics closely

## 2024-01-04 NOTE — RESPIRATORY THERAPY NOTE
RT Ventilator Management Note  Dionne Zuluaga 89 y.o. female MRN: [id number]  Unit/Bed#: ICU 12-01 Encounter: [id number]      Daily Screen         1/4/2024  0425 1/4/2024  0812          Patient safety screen outcome:: Passed Passed                Physical Exam:   Assessment Type: Pre-treatment  General Appearance: Awake  Respiratory Pattern: Assisted  Chest Assessment: Chest expansion symmetrical  Bilateral Breath Sounds: Diminished, Inspiratory wheezes  Suction: ET Tube  O2 Device: Ventilator      Resp Comments: Patient remains intubated but currently on a sedation awakening trial.  tolerating well. Patient is synchronous with the ventilator. tube secured in proper place. neb treatment given in line.  Will begin weaning.     01/04/24 0812   Vent Information   Vent ID [id number]   Vent type Hammond G5   Hammond C3/G5 Vent Mode (S)CMV   $ Pulse Oximetry Spot Check Charge Completed   SpO2 96 %   (S)CMV Settings   Resp Rate (BPM) 18 BPM   VT (mL) 350 mL   FIO2 (%) 50 %   PEEP (cmH2O) 6 cmH2O   I:E Ratio 1:2.3   Insp Time (%) 1 %   Flow Trigger (LPM) 5   Humidification Heater   Heater Temperature (Set) 95 °F (35 °C)   (S)CMV Actuals   Resp Rate (BPM) 19 BPM   VT (mL) 378   MV 6.6   MAP (cmH2O) 12 cmH2O   Peak Pressure (cmH2O) 31 cmH2O   I:E Ratio (Obs) 1:2.3   Insp Resistance 26   Heater Temperature (Obs) 95 °F (35 °C)   Static Compliance (mL/cmH20) 38.5 mL/cmH2O   Plateau Pressure (cm H2O) 25 cm H2O   (S)CMV Alarms   High Peak Pressure (cmH2O) 45   Low Pressure (cmH2O) 8 cm H2O   High Resp Rate (BPM) 40 BPM   Low Resp Rate (BPM) 8 BPM   High MV (L/min) 20 L/min   Low MV (L/min) 4 L/min   High VT (mL) 1000 mL   Low VT (mL) 250 mL   Apnea Time (s) 20 S   Maintenance   Alarm (pink) cable attached No   Resuscitation bag with peep valve at bedside Yes   Water bag changed No   Circuit changed No   Daily Screen   Patient safety screen outcome: Passed   IHI Ventilator Associated Pneumonia Bundle   Head of Bed Elevated HOB 30    ETT  Hi-Lo;Cuffed 7.5 mm   Placement Date/Time: 01/02/24 (c) 7045   Type: Hi-Lo;Cuffed  Tube Size: 7.5 mm  Location: Oral  Insertion attempts: 1  Placement Verification: Chest x-ray;End tidal CO2;Symmetrical chest wall movement  Secured at (cm): 23 teeth   Secured at (cm) 23   Measured from Teeth   Secured Location Right   Repositioned (S)  Right to Center   Secured by Commercial tube van   Site Condition Dry   Cuff Pressure (color) Green   HI-LO Suction  Continuous low suction   HI-LO Secretions Scant   HI-LO Intervention Patent

## 2024-01-04 NOTE — PLAN OF CARE
Problem: PAIN - ADULT  Goal: Verbalizes/displays adequate comfort level or baseline comfort level  Description: Interventions:  - Encourage patient to monitor pain and request assistance  - Assess pain using appropriate pain scale  - Administer analgesics based on type and severity of pain and evaluate response  - Implement non-pharmacological measures as appropriate and evaluate response  - Consider cultural and social influences on pain and pain management  - Notify physician/advanced practitioner if interventions unsuccessful or patient reports new pain  Outcome: Progressing     Problem: INFECTION - ADULT  Goal: Absence or prevention of progression during hospitalization  Description: INTERVENTIONS:  - Assess and monitor for signs and symptoms of infection  - Monitor lab/diagnostic results  - Monitor all insertion sites, i.e. indwelling lines, tubes, and drains  - Monitor endotracheal if appropriate and nasal secretions for changes in amount and color  - [place] appropriate cooling/warming therapies per order  - Administer medications as ordered  - Instruct and encourage patient and family to use good hand hygiene technique  - Identify and instruct in appropriate isolation precautions for identified infection/condition  Outcome: Progressing     Problem: SAFETY ADULT  Goal: Patient will remain free of falls  Description: INTERVENTIONS:  - Educate patient/family on patient safety including physical limitations  - Instruct patient to call for assistance with activity   - Consult OT/PT to assist with strengthening/mobility   - Keep Call bell within reach  - Keep bed low and locked with side rails adjusted as appropriate  - Keep care items and personal belongings within reach  - Initiate and maintain comfort rounds  - Make Fall Risk Sign visible to staff  - Offer Toileting every 2 Hours, in advance of need  - Initiate/Maintain bed alarm  - Obtain necessary fall risk management equipment  - Apply yellow socks and  bracelet for high fall risk patients  - Consider moving patient to room near nurses station  Outcome: Progressing  Goal: Maintain or return to baseline ADL function  Description: INTERVENTIONS:  -  Assess patient's ability to carry out ADLs; assess patient's baseline for ADL function and identify physical deficits which impact ability to perform ADLs (bathing, care of mouth/teeth, toileting, grooming, dressing, etc.)  - Assess/evaluate cause of self-care deficits   - Assess range of motion  - Assess patient's mobility; develop plan if impaired  - Assess patient's need for assistive devices and provide as appropriate  - Encourage maximum independence but intervene and supervise when necessary  - Involve family in performance of ADLs  - Assess for home care needs following discharge   - Consider OT consult to assist with ADL evaluation and planning for discharge  - Provide patient education as appropriate  Outcome: Progressing    Problem: DISCHARGE PLANNING  Goal: Discharge to home or other facility with appropriate resources  Description: INTERVENTIONS:  - Identify barriers to discharge w/patient and caregiver  - Arrange for needed discharge resources and transportation as appropriate  - Identify discharge learning needs (meds, wound care, etc.)  - Arrange for interpretive services to assist at discharge as needed  - Refer to Case Management Department for coordinating discharge planning if the patient needs post-hospital services based on physician/advanced practitioner order or complex needs related to functional status, cognitive ability, or social support system  Outcome: Progressing     Problem: Knowledge Deficit  Goal: Patient/family/caregiver demonstrates understanding of disease process, treatment plan, medications, and discharge instructions  Description: Complete learning assessment and assess knowledge base.  Interventions:  - Provide teaching at level of understanding  - Provide teaching via preferred  learning methods  Outcome: Progressing     Problem: Nutrition/Hydration-ADULT  Goal: Nutrient/Hydration intake appropriate for improving, restoring or maintaining nutritional needs  Description: Monitor and assess patient's nutrition/hydration status for malnutrition. Collaborate with interdisciplinary team and initiate plan and interventions as ordered.  Monitor patient's weight and dietary intake as ordered or per policy. Utilize nutrition screening tool and intervene as necessary. Determine patient's food preferences and provide high-protein, high-caloric foods as appropriate.     INTERVENTIONS:  - Monitor oral intake, urinary output, labs, and treatment plans  - Assess nutrition and hydration status and recommend course of action  - Evaluate amount of meals eaten  - Assist patient with eating if necessary   - Allow adequate time for meals  - Recommend/ encourage appropriate diets, oral nutritional supplements, and vitamin/mineral supplements  - Order, calculate, and assess calorie counts as needed  - Recommend, monitor, and adjust tube feedings and TPN/PPN based on assessed needs  - Assess need for intravenous fluids  - Provide specific nutrition/hydration education as appropriate  - Include patient/family/caregiver in decisions related to nutrition  Outcome: Progressing     Problem: Prexisting or High Potential for Compromised Skin Integrity  Goal: Skin integrity is maintained or improved  Description: INTERVENTIONS:  - Identify patients at risk for skin breakdown  - Assess and monitor skin integrity  - Assess and monitor nutrition and hydration status  - Monitor labs   - Assess for incontinence   - Turn and reposition patient  - Assist with mobility/ambulation  - Relieve pressure over bony prominences  - Avoid friction and shearing  - Provide appropriate hygiene as needed including keeping skin clean and dry  - Evaluate need for skin moisturizer/barrier cream  - Collaborate with interdisciplinary team   -  Patient/family teaching  - Consider wound care consult   Outcome: Progressing     Problem: SAFETY,RESTRAINT: NV/NON-SELF DESTRUCTIVE BEHAVIOR  Goal: Remains free of harm/injury (restraint for non violent/non self-detsructive behavior)  Description: INTERVENTIONS:  - Instruct patient/family regarding restraint use   - Assess and monitor physiologic and psychological status   - Provide interventions and comfort measures to meet assessed patient needs   - Identify and implement measures to help patient regain control  - Assess readiness for release of restraint   Outcome: Progressing  Goal: Returns to optimal restraint-free functioning  Description: INTERVENTIONS:  - Assess the patient's behavior and symptoms that indicate continued need for restraint  - Identify and implement measures to help patient regain control  - Assess readiness for release of restraint   Outcome: Progressing     Problem: RESPIRATORY - ADULT  Goal: Achieves optimal ventilation and oxygenation  Description: INTERVENTIONS:  - Assess for changes in respiratory status  - Assess for changes in mentation and behavior  - Position to facilitate oxygenation and minimize respiratory effort  - Oxygen administered by appropriate delivery if ordered  - Initiate smoking cessation education as indicated  - Encourage broncho-pulmonary hygiene including cough, deep breathe, Incentive Spirometry  - Assess the need for suctioning and aspirate as needed  - Assess and instruct to report SOB or any respiratory difficulty  - Respiratory Therapy support as indicated  Outcome: Progressing

## 2024-01-04 NOTE — ASSESSMENT & PLAN NOTE
Possibly in setting of decreased PO intake vs SIADH related to pneumonia vs outpatient HCTZ use    Plan:  Currently NPO - holding on IVF  Continue to trend closely - plan for no more than 6-8 mEq change over 24H  Frequent neuro checks

## 2024-01-04 NOTE — ASSESSMENT & PLAN NOTE
Lab Results   Component Value Date    EGFR 28 01/03/2024    EGFR 35 01/03/2024    EGFR 36 01/02/2024    CREATININE 1.58 (H) 01/03/2024    CREATININE 1.30 01/03/2024    CREATININE 1.27 01/02/2024     Baseline creatinine appears to be around 1.4 - 1.9    Plan:  Avoid nephrotoxic agents and hypotension  Trend renal indices  Strict I/O   Daily weights

## 2024-01-04 NOTE — PROGRESS NOTES
[facility]  Progress Note  Name: Dionne Zuluaga I  MRN: [id number]  Unit/Bed#: ICU 12-01 I Date of Admission: 1/1/2024   Date of Service: 1/4/2024 I Hospital Day: 3    Assessment/Plan   * Acute respiratory failure with hypoxia and hypercapnia (HCC)  Assessment & Plan  Admitted on 01/01 for progressive SOB x6D - transferred to ICU today for respiratory distress  Suspect this is 2/2 pneumonia w/likely COPD exacerbation though no formal COPD diagnosis per chart review  Initial CT imaging revealed no PE, however did reveal attenuation of multiple small airways in right lower lobe which could be related to infectious or inflammatory bronchitis, aspiration, and/or mucous plugging  COVID/FLU/RSV negative  Urine strep/legionella negative  Pt intubated 1/2    Plan:  Sputum Culture Pending  Currently on IV Solu Medrol 60 mg Q6H  Continue scheduled Xopenex/Atrovent HTS nebs  Continue IV Cefepime/Vancomycin/Azithromycin, now D3  Aggressive pulmonary hygiene  After GOC discussion w/family and patient at bedside, patient tried BIPAP rather than intubaation, but failed and thus  be agreed to intubation trial but would not want long term vent requirement  VAP precautions  SBT    Severe sepsis (HCC)  Assessment & Plan  Met sepsis criteria on admission - now meeting severe sepsis given new need for BIPAP  In setting of pneumonia as noted above  Blood cultures NGTD  UA w/out evidence of infection  Urine strep/legionella negative    Plan:  Will send sputum culture Pending  Lactic acid 0.9  Procalcitonin 0.11 - 0.22  Holding on IVF given tenuous respiratory status  Continues on IV Cefepime/Vancomycin/Azithromycin as noted above  Monitor fever and WBC curve  Trend procalcitonin and follow-up cutlures    Hypertensive emergency  Assessment & Plan  BP in 200s/80s on arrival to ICU  Suspect this is 2/2 respiratory distress given acute respiratory failure as noted above    Plan:  Holding home Lisinopril for now while  NPO  Monitor hemodynamics closely    Elevated d-dimer  Assessment & Plan  Imaging negative for PE      Bilateral LE venous duplexes - negative  Possibly elevated in setting of severe CAP    Hyponatremia  Assessment & Plan  Possibly in setting of decreased PO intake vs SIADH related to pneumonia vs outpatient HCTZ use    Plan:  Currently NPO - holding on IVF  Continue to trend closely - plan for no more than 6-8 mEq change over 24H  Frequent neuro checks    Type 2 diabetes mellitus with stage 3 chronic kidney disease, without long-term current use of insulin ([facility])  Assessment & Plan  Lab Results   Component Value Date    HGBA1C 6.8 (H) 01/01/2024       Recent Labs     01/02/24  2357 01/03/24  0554 01/03/24  1140 01/03/24  1729   POCGLU 177* 190* 201* 223*         Blood Sugar Average: Last 72 hrs:  (P) 190.5    Does not appear to take antihyperglycemic agents as outpatient    Plan:  Continue SSI w/fingersticks Q6H  Goal  - 180    Tobacco use disorder  Assessment & Plan  Plan:  NRT  Encourage cessation when appropriate    Mixed hyperlipidemia  Assessment & Plan  Plan:  Holding home statin while NPO    Depression, major, in remission ([facility])  Assessment & Plan  Plan:  Holding home Zoloft while NPO    CKD stage G3b/A1, GFR 30-44 and albumin creatinine ratio <30 mg/g ([facility])  Assessment & Plan  Lab Results   Component Value Date    EGFR 28 01/03/2024    EGFR 35 01/03/2024    EGFR 36 01/02/2024    CREATININE 1.58 (H) 01/03/2024    CREATININE 1.30 01/03/2024    CREATININE 1.27 01/02/2024     Baseline creatinine appears to be around 1.4 - 1.9    Plan:  Avoid nephrotoxic agents and hypotension  Trend renal indices  Strict I/O   Daily weights             Disposition: Critical care    ICU Core Measures     Vented Patient  VAP Bundle  VAP bundle ordered     A: Assess, Prevent, and Manage Pain Has pain been assessed? Yes  Need for changes to pain regimen? No   B: Both Spontaneous Awakening Trials (SATs) and Spontaneous  Breathing Trials (SBTs) Plan to perform spontaneous awakening trial today? Yes   Plan to perform spontaneous breathing trial today? Yes   Obvious barriers to extubation? No   C: Choice of Sedation RASS Goal: -1 Drowsy or 0 Alert and Calm  Need for changes to sedation or analgesia regimen? No   D: Delirium CAM-ICU: Unable to perform secondary to Acute cognitive dysfunction   E: Early Mobility  Plan for early mobility? Yes   F: Family Engagement Plan for family engagement today? Yes       Antibiotic Review: Patient on appropriate coverage based on culture data.  and Awaiting culture results.     Review of Invasive Devices:    Ruby Plan: Continue for accurate I/O monitoring for 48 hours        Prophylaxis:  VTE VTE covered by:  heparin (porcine), Subcutaneous, 5,000 Units at 01/03/24 2133       Stress Ulcer  not ordered         Significant 24hr Events     24hr events: Pt given a one time dose of Albumin for low u/o yesterday. No acute events overnight.     Subjective   Review of Systems   Unable to perform ROS: Intubated      Objective                            Vitals I/O      Most Recent Min/Max in 24hrs   Temp 98.6 °F (37 °C) Temp  Min: 96.4 °F (35.8 °C)  Max: 99.1 °F (37.3 °C)   Pulse 75 Pulse  Min: 52  Max: 86   Resp (!) 27 Resp  Min: 17  Max: 35   /56 BP  Min: 82/48  Max: 116/57   O2 Sat 95 % SpO2  Min: 92 %  Max: 96 %      Intake/Output Summary (Last 24 hours) at 1/4/2024 0030  Last data filed at 1/4/2024 0000  Gross per 24 hour   Intake 1889.97 ml   Output 945 ml   Net 944.97 ml       Diet Enteral/Parenteral; Tube Feeding No Oral Diet; Jevity 1.2 Sebastián; Continuous; 45; 50; Water; Every 6 hours    Invasive Monitoring           Physical Exam   Physical Exam  Vitals and nursing note reviewed.   Eyes:      Pupils: Pupils are equal, round, and reactive to light.   Skin:     General: Skin is warm and dry.      Capillary Refill: Capillary refill takes less than 2 seconds.   HENT:      Head: Normocephalic.       Mouth/Throat:      Mouth: Mucous membranes are dry.      Comments: OG tube in place  Cardiovascular:      Rate and Rhythm: Normal rate and regular rhythm.      Pulses: Normal pulses.      Heart sounds: Normal heart sounds.   Musculoskeletal:         General: Normal range of motion.      Right lower leg: Trace Edema present.      Left lower leg: Trace Edema present.   Abdominal: General: Bowel sounds are normal.      Palpations: Abdomen is soft.   Constitutional:       General: She is awake.      Appearance: She is obese. She is ill-appearing.      Interventions: She is intubated and restrained.   Pulmonary:      Effort: She is intubated.      Breath sounds: Rhonchi present.   Neurological:        Corneal reflex present, cough reflex and gag reflex intact.   Genitourinary/Anorectal:  Beltran present.          Diagnostic Studies      EKG: NSR  Imaging:  I have personally reviewed pertinent reports.   and I have personally reviewed pertinent films in PACS     Medications:  Scheduled PRN   azithromycin, 500 mg, Q24H  cefepime, 2,000 mg, Q12H  chlorhexidine, 15 mL, Q12H RUTH  heparin (porcine), 5,000 Units, Q8H RUTH  insulin lispro, 2-12 Units, Q6H RUTH  ipratropium, 0.5 mg, TID  levalbuterol, 1.25 mg, TID  methylPREDNISolone sodium succinate, 60 mg, Q6H RUTH  senna-docusate sodium, 2 tablet, HS  sodium chloride, 4 mL, TID  vancomycin, 1,000 mg, Q24H      acetaminophen, 650 mg, Q6H PRN  benzonatate, 100 mg, TID PRN  dextromethorphan-guaiFENesin, 10 mL, Q4H PRN  fentanyl citrate (PF), 50 mcg, Q1H PRN  ondansetron, 4 mg, Q6H PRN       Continuous    fentaNYL, 75 mcg/hr, Last Rate: 75 mcg/hr (01/03/24 2000)  norepinephrine, 1-30 mcg/min, Last Rate: 2 mcg/min (01/03/24 2000)  propofol, 5-50 mcg/kg/min, Last Rate: 25 mcg/kg/min (01/03/24 2332)         Labs:    CBC    Recent Labs     01/02/24  0238 01/02/24  1458 01/03/24  0441   WBC 18.00*  --  17.62*   HGB 12.3 11.2* 11.3*   HCT 37.0 33* 34.2*     --  247   BANDSPCT 5  --    --      BMP    Recent Labs     01/03/24  0441 01/03/24  1542   SODIUM 123* 123*   K 3.8 4.1   CL 91* 92*   CO2 24 22   AGAP 8 9   BUN 30* 39*   CREATININE 1.30 1.58*   CALCIUM 8.6 8.8       Coags    No recent results     Additional Electrolytes  Recent Labs     01/03/24  0441 01/03/24  1542   MG 2.1 2.2   PHOS 3.4 3.0   CAIONIZED 1.14 1.15          Blood Gas    Recent Labs     01/02/24 1959   PHART 7.377   TER9LSO 40.6   PO2ART 83.2   PGL7HSS 23.3   BEART -1.7   SOURCE Radial, Left     Recent Labs     01/02/24  1131 01/02/24 1959   PHVEN 7.307  --    KTK6VWS 51.6*  --    PO2VEN 39.9  --    HJQ6IUP 25.2  --    BEVEN -1.7  --    F9QVFAA 72.4  --    SOURCE  --  Radial, Left    LFTs  Recent Labs     01/02/24 0238 01/03/24 0441   ALT 49 52   AST 51* 43*   ALKPHOS 50 42   ALB 3.6 3.3*   TBILI 0.30 0.48       Infectious  Recent Labs     01/02/24 0238 01/03/24  0441   PROCALCITONI 0.22 0.31*     Glucose  Recent Labs     01/02/24 0238 01/02/24  1655 01/03/24 0441 01/03/24  1542   GLUC 178* 181* 211* 226*               Critical Care Time Statement: Upon my evaluation, this patient had a high probability of imminent or life-threatening deterioration due to Acute respiratory failure with hypoxia and hypercapnia, which required my direct attention, intervention, and personal management.  I spent a total of 28 minutes directly providing critical care services, including interpretation of complex medical databases, evaluating for the presence of life-threatening injuries or illnesses, management of organ system failure(s) , complex medical decision making (to support/prevent further life-threatening deterioration)., interpretation of hemodynamic data, titration of vasoactive medications, titration of continuous IV medications (drips), ventilator management, and managing enteral or parenteral nutritional support. This time is exclusive of procedures, teaching, family meetings, and any prior time recorded by providers other  than myself.      TARUN Mckeon

## 2024-01-04 NOTE — RESPIRATORY THERAPY NOTE
01/04/24 1118   Respiratory Assessment   Resp Comments Patient extubated to 6L NC.  No stidor.  sx for moderated amount of thick yellow secrations.  patient with good strong cough.   Vent Information   Ventilator End Yes   Daily Screen   Patient safety screen outcome: Passed   Spont breathing trial outcome: Passed   Extubation order obtained Yes   Consider Cuff Test Yes   Patient extubated Yes   RSBI 38

## 2024-01-04 NOTE — NUTRITION
01/04/24 1002   Biochemical Data,Medical Tests, and Procedures   Biochemical Data/Medical Tests/Procedures Lab values reviewed;Meds reviewed   Labs (Comment) 1/4/2024 glucose 205, sodium 125, chloride 93, BUN 44, creatinine 1.59, ALT 53, hemoglobin 10.7, hematocrit 33.1   Meds (Comment) Heparin, insulin, Protonix, senna   Nutrition-Focused Physical Exam   Nutrition-Focused Physical Exam Findings RN skin assessment reviewed;Edema;No skin issues documented  (Trace bilateral lower extremity edema)   Medical-Related Concerns PMH reviewed   Adequacy of Intake   Nutrition Modality NPO;EN   Feeding Route   Tube Feeding OG-tube   Formula Jevity 1.2   Formula rate 45 mL/h   Free Water From  mL   Water Flushes Yes  (50 mL water flush q6hr)   Total Free Water  2445 mL   Total EN Volume 835 mL   EN Kcals TF Formula Only 1002 Kcals   Protein Intake (g) 46 g   Current PO Intake   Estimated calorie intake compared to estimated need Nutrient needs are not met.   PES Statement   Problem Continue previous diagnosis   Recommendations/Interventions   Malnutrition/BMI Present No   Summary Consult-tube feed.  Nutrition follow-up assessment.  Currently prescribed Jevity 1.2 at 45 mL/h continuous.  50 mL water flush q6hr. See breakdown of nutrients received 1/3 as above.  Notified by critical care plans to extubate patient today.  When able to initiate p.o. diet, recommend CCD 2, pending RN dysphagia screening results.  Continue tube feed as prescribed otherwise.  RD continues following as hospital course progresses.   Interventions/Recommendations Monitor I & O's   Education Assessment   Education Education not indicated at this time;Patient/caregiver not appropriate for education at this time   Patient Nutrition Goals   Goal Nutrition via appropriate route

## 2024-01-04 NOTE — SPEECH THERAPY NOTE
Speech Language/Pathology  Speech/Language Pathology  Assessment    Patient Name: Dionne Zuluaga  Today's Date: 1/4/2024     Problem List  Principal Problem:    Acute respiratory failure with hypoxia and hypercapnia (HCC)  Active Problems:    CKD stage G3b/A1, GFR 30-44 and albumin creatinine ratio <30 mg/g (HCC)    Depression, major, in remission (HCC)    Hypertensive emergency    Mixed hyperlipidemia    Tobacco use disorder    Type 2 diabetes mellitus with stage 3 chronic kidney disease, without long-term current use of insulin (HCC)    Hyponatremia    Severe sepsis (HCC)    Elevated d-dimer    Past Medical History  Past Medical History:   Diagnosis Date    Hyperlipidemia     Hypertension      Past Surgical History  Past Surgical History:   Procedure Laterality Date    HYSTERECTOMY  1974 01/04/24 1250   Patient Information   Current Medical Recent extubation   Special Studies CT   Past Medical History resp failure, pneumonia, hypoxia   Swallow Information   Current Risks for Dysphagia & Aspiration Recent intubation;Respiratory compromise;Weak cough;Weak voicing;General debilitation   Current Symptoms/Concerns Cough;Clear throat   Current Diet NPO   Baseline Diet Regular;Thin liquids   Baseline Assessment   Behavior/Cognition Alert;Cooperative;Interactive   Speech/Language Status WFL   Patient Positioning Upright in bed   Swallow Mechanism Exam   Labial Symmetry WFL   Labial Strength WFL   Labial ROM WFL   Labial Sensation WFL   Facial Symmetry WFL   Facial Strength WFL   Facial ROM WFL   Facial Sensation WFL   Lingual Symmetry WFL   Lingual Strength Mild reduced   Lingual ROM WFL   Lingual Sensation WFL   Dentition Adequate   Volitional Cough Weak   Consistencies Assessed and Performance   Materials Admnistered Puree/Level 1   Oral Stage Mild impaired   Phargngeal Stage Moderate impaired;Significant aspiration risk   Swallow Mechanics Mild delayed;Swallow initation;Weak larygneal rise;Vocal Cord  dysfunction suspected;Aspiration risk   Strategies and Efficacy remain NPO   Summary   Swallow Summary Patient received sitting upright in bed with sister visiting.  Pt having been extubated just one hour prior to my arrival after having been intubated for 36 hours.  Vocal quality at my arrival is very weak, strained, hoarse.  Maximum phonation time averaged 4.17 seconds with minimal improvement despite resp coaching.  S/Z ratio was poor with /s/ of 2.02 and /z/ of 1.47 suggestive of vocal cord pathology.  Will cont to monitor vocal quality to ensure airway protection.  Did attempt oral care and 1/4 tsp of puree applesauce.  Pt's transfer and manipulation is delayed overall, suspect aspriation due to decreased vocal cord closure.  Minimal rise and excursion influencing overall swallow completeness.  Recommend patient remain NPO at this time and ST to follow up tomorrow.   Recommendations   Risk for Aspiration Present   Recommendations Continue swallow eval process   Recommended Form of Meds Feeding tube/IV   General Precautions Aspiration precautions   Results Reviewed with RN;PT/Family/Caregiver   Speech Therapy Prognosis   Prognosis Fair

## 2024-01-04 NOTE — ASSESSMENT & PLAN NOTE
Lab Results   Component Value Date    HGBA1C 6.8 (H) 01/01/2024       Recent Labs     01/02/24  2357 01/03/24  0554 01/03/24  1140 01/03/24  1729   POCGLU 177* 190* 201* 223*         Blood Sugar Average: Last 72 hrs:  (P) 190.5    Does not appear to take antihyperglycemic agents as outpatient    Plan:  Continue SSI w/fingersticks Q6H  Goal  - 180

## 2024-01-04 NOTE — NURSING NOTE
Order to extubate. Pt extubated by respiratory w/o complications. Placed on 6 L nasal cannula. Pt with productive cough. Moderate amount of think yellow sputum. Pt mouth suctioned and then swabbed w/ mouthwash. Pt and family educated on use of Yankauer to suction self. Pt demonstrated understanding.

## 2024-01-04 NOTE — ASSESSMENT & PLAN NOTE
Admitted on 01/01 for progressive SOB x6D - transferred to ICU today for respiratory distress  Suspect this is 2/2 pneumonia w/likely COPD exacerbation though no formal COPD diagnosis per chart review  Initial CT imaging revealed no PE, however did reveal attenuation of multiple small airways in right lower lobe which could be related to infectious or inflammatory bronchitis, aspiration, and/or mucous plugging  COVID/FLU/RSV negative  Urine strep/legionella negative  Pt intubated 1/2    Plan:  Sputum Culture Pending  Currently on IV Solu Medrol 60 mg Q6H  Continue scheduled Xopenex/Atrovent HTS nebs  Continue IV Cefepime/Vancomycin/Azithromycin, now D3  Aggressive pulmonary hygiene  After GOC discussion w/family and patient at bedside, patient tried BIPAP rather than intubaation, but failed and thus  be agreed to intubation trial but would not want long term vent requirement  VAP precautions  SBT

## 2024-01-04 NOTE — ASSESSMENT & PLAN NOTE
Imaging negative for PE      Bilateral LE venous duplexes - negative  Possibly elevated in setting of severe CAP

## 2024-01-04 NOTE — PROGRESS NOTES
Dionne Zuluaga is a 89 y.o. female who is currently ordered Vancomycin IV with management by the Pharmacy Consult service.  Relevant clinical data and objective / subjective history reviewed.  Vancomycin Assessment:  Indication and Goal AUC/Trough: Pneumonia (goal -600, trough >10)  Clinical Status: stable  Micro:     Renal Function:  SCr: 1.59 mg/dL  CrCl: 24.2 mL/min  Renal replacement: Not on dialysis  Days of Therapy: 3  Current Dose: 1000mg IV Q24hrs  Vancomycin Plan:  New Dosinmg IV Q24hrs  Estimated AUC: 461 mcg*hr/mL  Estimated Trough: 16 mcg/mL  Next Level: 24 with am labs   Renal Function Monitoring: Daily BMP and UOP  Pharmacy will continue to follow closely for s/sx of nephrotoxicity, infusion reactions and appropriateness of therapy.  BMP and CBC will be ordered per protocol. We will continue to follow the patient’s culture results and clinical progress daily.    Dariela Pedroza, Pharmacist

## 2024-01-04 NOTE — ASSESSMENT & PLAN NOTE
Met sepsis criteria on admission - now meeting severe sepsis given new need for BIPAP  In setting of pneumonia as noted above  Blood cultures NGTD  UA w/out evidence of infection  Urine strep/legionella negative    Plan:  Will send sputum culture Pending  Lactic acid 0.9  Procalcitonin 0.11 - 0.22  Holding on IVF given tenuous respiratory status  Continues on IV Cefepime/Vancomycin/Azithromycin as noted above  Monitor fever and WBC curve  Trend procalcitonin and follow-up cutlures

## 2024-01-05 PROBLEM — R13.10 DYSPHAGIA: Status: ACTIVE | Noted: 2024-01-05

## 2024-01-05 LAB
ANION GAP SERPL CALCULATED.3IONS-SCNC: 7 MMOL/L
ANION GAP SERPL CALCULATED.3IONS-SCNC: 9 MMOL/L
BACTERIA SPT RESP CULT: ABNORMAL
BASOPHILS # BLD MANUAL: 0 THOUSAND/UL (ref 0–0.1)
BASOPHILS NFR MAR MANUAL: 0 % (ref 0–1)
BUN SERPL-MCNC: 47 MG/DL (ref 5–25)
BUN SERPL-MCNC: 48 MG/DL (ref 5–25)
CA-I BLD-SCNC: 1.19 MMOL/L (ref 1.12–1.32)
CALCIUM SERPL-MCNC: 9 MG/DL (ref 8.4–10.2)
CALCIUM SERPL-MCNC: 9.1 MG/DL (ref 8.4–10.2)
CHLORIDE SERPL-SCNC: 100 MMOL/L (ref 96–108)
CHLORIDE SERPL-SCNC: 99 MMOL/L (ref 96–108)
CO2 SERPL-SCNC: 23 MMOL/L (ref 21–32)
CO2 SERPL-SCNC: 27 MMOL/L (ref 21–32)
CREAT SERPL-MCNC: 1.31 MG/DL (ref 0.6–1.3)
CREAT SERPL-MCNC: 1.41 MG/DL (ref 0.6–1.3)
EOSINOPHIL # BLD MANUAL: 0 THOUSAND/UL (ref 0–0.4)
EOSINOPHIL NFR BLD MANUAL: 0 % (ref 0–6)
ERYTHROCYTE [DISTWIDTH] IN BLOOD BY AUTOMATED COUNT: 14.1 % (ref 11.6–15.1)
GFR SERPL CREATININE-BSD FRML MDRD: 32 ML/MIN/1.73SQ M
GFR SERPL CREATININE-BSD FRML MDRD: 35 ML/MIN/1.73SQ M
GLUCOSE SERPL-MCNC: 139 MG/DL (ref 65–140)
GLUCOSE SERPL-MCNC: 174 MG/DL (ref 65–140)
GLUCOSE SERPL-MCNC: 174 MG/DL (ref 65–140)
GLUCOSE SERPL-MCNC: 175 MG/DL (ref 65–140)
GLUCOSE SERPL-MCNC: 185 MG/DL (ref 65–140)
GLUCOSE SERPL-MCNC: 198 MG/DL (ref 65–140)
GRAM STN SPEC: ABNORMAL
HCT VFR BLD AUTO: 36.7 % (ref 34.8–46.1)
HGB BLD-MCNC: 12 G/DL (ref 11.5–15.4)
LG PLATELETS BLD QL SMEAR: PRESENT
LYMPHOCYTES # BLD AUTO: 11 % (ref 14–44)
LYMPHOCYTES # BLD AUTO: 2.11 THOUSAND/UL (ref 0.6–4.47)
MAGNESIUM SERPL-MCNC: 2.7 MG/DL (ref 1.9–2.7)
MCH RBC QN AUTO: 30.5 PG (ref 26.8–34.3)
MCHC RBC AUTO-ENTMCNC: 32.7 G/DL (ref 31.4–37.4)
MCV RBC AUTO: 93 FL (ref 82–98)
MONOCYTES # BLD AUTO: 1.15 THOUSAND/UL (ref 0–1.22)
MONOCYTES NFR BLD: 6 % (ref 4–12)
MYELOCYTES NFR BLD MANUAL: 1 % (ref 0–1)
NEUTROPHILS # BLD MANUAL: 15.74 THOUSAND/UL (ref 1.85–7.62)
NEUTS BAND NFR BLD MANUAL: 1 % (ref 0–8)
NEUTS SEG NFR BLD AUTO: 81 % (ref 43–75)
PHOSPHATE SERPL-MCNC: 3.2 MG/DL (ref 2.3–4.1)
PLATELET # BLD AUTO: 265 THOUSANDS/UL (ref 149–390)
PLATELET BLD QL SMEAR: ADEQUATE
PMV BLD AUTO: 9.9 FL (ref 8.9–12.7)
POTASSIUM SERPL-SCNC: 4.8 MMOL/L (ref 3.5–5.3)
POTASSIUM SERPL-SCNC: 5 MMOL/L (ref 3.5–5.3)
RBC # BLD AUTO: 3.94 MILLION/UL (ref 3.81–5.12)
SODIUM SERPL-SCNC: 131 MMOL/L (ref 135–147)
SODIUM SERPL-SCNC: 134 MMOL/L (ref 135–147)
VANCOMYCIN SERPL-MCNC: 19.5 UG/ML (ref 10–20)
WBC # BLD AUTO: 19.2 THOUSAND/UL (ref 4.31–10.16)

## 2024-01-05 PROCEDURE — 85027 COMPLETE CBC AUTOMATED: CPT | Performed by: NURSE PRACTITIONER

## 2024-01-05 PROCEDURE — 80048 BASIC METABOLIC PNL TOTAL CA: CPT | Performed by: NURSE PRACTITIONER

## 2024-01-05 PROCEDURE — 99232 SBSQ HOSP IP/OBS MODERATE 35: CPT | Performed by: PHYSICIAN ASSISTANT

## 2024-01-05 PROCEDURE — C9113 INJ PANTOPRAZOLE SODIUM, VIA: HCPCS

## 2024-01-05 PROCEDURE — 82948 REAGENT STRIP/BLOOD GLUCOSE: CPT

## 2024-01-05 PROCEDURE — 84100 ASSAY OF PHOSPHORUS: CPT | Performed by: NURSE PRACTITIONER

## 2024-01-05 PROCEDURE — 94640 AIRWAY INHALATION TREATMENT: CPT

## 2024-01-05 PROCEDURE — 94668 MNPJ CHEST WALL SBSQ: CPT

## 2024-01-05 PROCEDURE — 82330 ASSAY OF CALCIUM: CPT | Performed by: NURSE PRACTITIONER

## 2024-01-05 PROCEDURE — 94664 DEMO&/EVAL PT USE INHALER: CPT

## 2024-01-05 PROCEDURE — 97163 PT EVAL HIGH COMPLEX 45 MIN: CPT

## 2024-01-05 PROCEDURE — 94669 MECHANICAL CHEST WALL OSCILL: CPT

## 2024-01-05 PROCEDURE — 80202 ASSAY OF VANCOMYCIN: CPT | Performed by: NURSE PRACTITIONER

## 2024-01-05 PROCEDURE — 94760 N-INVAS EAR/PLS OXIMETRY 1: CPT

## 2024-01-05 PROCEDURE — 97167 OT EVAL HIGH COMPLEX 60 MIN: CPT

## 2024-01-05 PROCEDURE — 85007 BL SMEAR W/DIFF WBC COUNT: CPT | Performed by: NURSE PRACTITIONER

## 2024-01-05 PROCEDURE — 83735 ASSAY OF MAGNESIUM: CPT | Performed by: NURSE PRACTITIONER

## 2024-01-05 RX ORDER — INSULIN LISPRO 100 [IU]/ML
1-6 INJECTION, SOLUTION INTRAVENOUS; SUBCUTANEOUS
Status: DISCONTINUED | OUTPATIENT
Start: 2024-01-05 | End: 2024-01-08 | Stop reason: HOSPADM

## 2024-01-05 RX ORDER — INSULIN LISPRO 100 [IU]/ML
1-6 INJECTION, SOLUTION INTRAVENOUS; SUBCUTANEOUS
Status: DISCONTINUED | OUTPATIENT
Start: 2024-01-06 | End: 2024-01-08 | Stop reason: HOSPADM

## 2024-01-05 RX ADMIN — INSULIN LISPRO 2 UNITS: 100 INJECTION, SOLUTION INTRAVENOUS; SUBCUTANEOUS at 17:40

## 2024-01-05 RX ADMIN — PANTOPRAZOLE SODIUM 40 MG: 40 INJECTION, POWDER, FOR SOLUTION INTRAVENOUS at 09:31

## 2024-01-05 RX ADMIN — INSULIN LISPRO 2 UNITS: 100 INJECTION, SOLUTION INTRAVENOUS; SUBCUTANEOUS at 12:29

## 2024-01-05 RX ADMIN — LEVALBUTEROL HYDROCHLORIDE 1.25 MG: 1.25 SOLUTION RESPIRATORY (INHALATION) at 13:41

## 2024-01-05 RX ADMIN — METHYLPREDNISOLONE SODIUM SUCCINATE 60 MG: 125 INJECTION, POWDER, FOR SOLUTION INTRAMUSCULAR; INTRAVENOUS at 20:38

## 2024-01-05 RX ADMIN — DOCUSATE SODIUM AND SENNOSIDES 2 TABLET: 8.6; 5 TABLET, FILM COATED ORAL at 21:38

## 2024-01-05 RX ADMIN — CEFEPIME HYDROCHLORIDE 2000 MG: 2 INJECTION, SOLUTION INTRAVENOUS at 20:38

## 2024-01-05 RX ADMIN — CEFEPIME HYDROCHLORIDE 2000 MG: 2 INJECTION, SOLUTION INTRAVENOUS at 11:09

## 2024-01-05 RX ADMIN — AZITHROMYCIN MONOHYDRATE 500 MG: 500 INJECTION, POWDER, LYOPHILIZED, FOR SOLUTION INTRAVENOUS at 07:41

## 2024-01-05 RX ADMIN — CHLORHEXIDINE GLUCONATE, 0.12% ORAL RINSE 15 ML: 1.2 SOLUTION DENTAL at 09:31

## 2024-01-05 RX ADMIN — CHLORHEXIDINE GLUCONATE, 0.12% ORAL RINSE 15 ML: 1.2 SOLUTION DENTAL at 20:38

## 2024-01-05 RX ADMIN — LEVALBUTEROL HYDROCHLORIDE 1.25 MG: 1.25 SOLUTION RESPIRATORY (INHALATION) at 07:51

## 2024-01-05 RX ADMIN — INSULIN LISPRO 2 UNITS: 100 INJECTION, SOLUTION INTRAVENOUS; SUBCUTANEOUS at 00:53

## 2024-01-05 RX ADMIN — METHYLPREDNISOLONE SODIUM SUCCINATE 60 MG: 125 INJECTION, POWDER, FOR SOLUTION INTRAMUSCULAR; INTRAVENOUS at 09:31

## 2024-01-05 RX ADMIN — HEPARIN SODIUM 5000 UNITS: 5000 INJECTION INTRAVENOUS; SUBCUTANEOUS at 14:00

## 2024-01-05 RX ADMIN — LEVALBUTEROL HYDROCHLORIDE 1.25 MG: 1.25 SOLUTION RESPIRATORY (INHALATION) at 21:50

## 2024-01-05 RX ADMIN — HEPARIN SODIUM 5000 UNITS: 5000 INJECTION INTRAVENOUS; SUBCUTANEOUS at 21:47

## 2024-01-05 NOTE — PROGRESS NOTES
[facility]  Progress Note  Name: Dionne Zuluaga I  MRN: [id number]  Unit/Bed#: ICU 12-01 I Date of Admission: 1/1/2024   Date of Service: 1/5/2024 I Hospital Day: 4    Assessment/Plan   * Acute respiratory failure with hypoxia and hypercapnia (HCC)  Assessment & Plan  Admitted on 01/01 for progressive SOB x6D - transferred to ICU  for respiratory distress  Suspect this is 2/2 pneumonia w/likely COPD exacerbation though no formal COPD diagnosis per chart review  Initial CT imaging revealed no PE, however did reveal attenuation of multiple small airways in right lower lobe which could be related to infectious or inflammatory bronchitis, aspiration, and/or mucous plugging  COVID/FLU/RSV negative  Urine strep/legionella negative  Pt intubated 1/2  Extubated 1/4    Plan:  Sputum Culture with mixed philly  Currently on IV Solu Medrol 60 mg Q12  Continue scheduled Xopenex/Atrovent HTS nebs  Continue IV Cefepime/Vancomycin/Azithromycin, now D4  Aggressive pulmonary hygiene      Severe sepsis (HCC)  Assessment & Plan  Met sepsis criteria on admission   In setting of pneumonia   Blood cultures NGTD  UA w/out evidence of infection  Urine strep/legionella negative    Plan:  Continues on IV Cefepime/Vancomycin/Azithromycin as noted above  Monitor fever and WBC curve  Trend procalcitonin and follow-up cutlures    Hypertensive emergency  Assessment & Plan  BP in 200s/80s on arrival to ICU  Suspect this is 2/2 respiratory distress given acute respiratory failure as noted above    Plan:  Holding home Lisinopril for now while NPO  Monitor hemodynamics closely    Elevated d-dimer  Assessment & Plan  Imaging negative for PE      Bilateral LE venous duplexes - negative  Possibly elevated in setting of severe CAP    Hyponatremia  Assessment & Plan  Possibly in setting of decreased PO intake vs SIADH related to pneumonia vs outpatient HCTZ use    Plan:  Currently NPO   Frequent neuro checks    Dysphagia  Assessment  & Plan  Concern for chronic aspiration as evidenced on imaging  Now n.p.o. after failing speech evaluation  Speech evaluation this morning    Type 2 diabetes mellitus with stage 3 chronic kidney disease, without long-term current use of insulin ([facility])  Assessment & Plan  Lab Results   Component Value Date    HGBA1C 6.8 (H) 01/01/2024       Recent Labs     01/04/24  0605 01/04/24  1127 01/04/24  1748 01/04/24  2336   POCGLU 205* 176* 141* 162*         Blood Sugar Average: Last 72 hrs:  (P) 180.4268658608629394    Does not appear to take antihyperglycemic agents as outpatient    Plan:  Continue SSI w/fingersticks Q6H  Goal  - 180    Tobacco use disorder  Assessment & Plan  Plan:  NRT  Encourage cessation when appropriate    Mixed hyperlipidemia  Assessment & Plan  Plan:  Holding home statin while NPO    Depression, major, in remission ([facility])  Assessment & Plan  Plan:  Holding home Zoloft while NPO    CKD stage G3b/A1, GFR 30-44 and albumin creatinine ratio <30 mg/g ([facility])  Assessment & Plan  Lab Results   Component Value Date    EGFR 27 01/04/2024    EGFR 28 01/03/2024    EGFR 35 01/03/2024    CREATININE 1.59 (H) 01/04/2024    CREATININE 1.58 (H) 01/03/2024    CREATININE 1.30 01/03/2024     Baseline creatinine appears to be around 1.4 - 1.9    Plan:  Avoid nephrotoxic agents and hypotension  Trend renal indices  Strict I/O   Daily weights             Disposition: Med Surg    ICU Core Measures     A: Assess, Prevent, and Manage Pain Has pain been assessed? Yes  Need for changes to pain regimen? No   B: Both SAT/SAT  N/A   C: Choice of Sedation RASS Goal: N/A patient not on sedation  Need for changes to sedation or analgesia regimen? NA   D: Delirium CAM-ICU: Negative   E: Early Mobility  Plan for early mobility? Yes   F: Family Engagement Plan for family engagement today? Yes       Antibiotic Review: Awaiting culture results.     Review of Invasive Devices:    Beltran Plan: Continue for accurate I/O monitoring for 48  hours        Prophylaxis:  VTE VTE covered by:  heparin (porcine), Subcutaneous, 5,000 Units at 01/04/24 2128       Stress Ulcer  covered bypantoprazole (PROTONIX) injection 40 mg [[id number]]         Significant 24hr Events     24hr events: Extubated yesterday to 3 L nasal cannula.  No acute events overnight     Subjective   Review of Systems   Respiratory:  Positive for cough. Negative for shortness of breath.    Cardiovascular:  Positive for chest pain.   Gastrointestinal: Negative.    Genitourinary: Negative.    Musculoskeletal: Negative.    Neurological: Negative.    Psychiatric/Behavioral: Negative.        Objective                            Vitals I/O      Most Recent Min/Max in 24hrs   Temp 99.1 °F (37.3 °C) Temp  Min: 97.9 °F (36.6 °C)  Max: 99.3 °F (37.4 °C)   Pulse 71 Pulse  Min: 63  Max: 90   Resp 17 Resp  Min: 14  Max: 27   /66 BP  Min: 103/53  Max: 148/67   O2 Sat 93 % SpO2  Min: 92 %  Max: 96 %      Intake/Output Summary (Last 24 hours) at 1/5/2024 0512  Last data filed at 1/5/2024 0400  Gross per 24 hour   Intake 1008.66 ml   Output 3175 ml   Net -2166.34 ml       No diet orders on file    Invasive Monitoring           Physical Exam   Physical Exam  Vitals and nursing note reviewed.   Eyes:      Pupils: Pupils are equal, round, and reactive to light.   Skin:     General: Skin is warm and dry.      Capillary Refill: Capillary refill takes less than 2 seconds.   HENT:      Head: Normocephalic.      Mouth/Throat:      Mouth: Mucous membranes are moist.   Cardiovascular:      Rate and Rhythm: Normal rate and regular rhythm.      Pulses: Normal pulses.   Constitutional:       General: She is not in acute distress.  Pulmonary:      Breath sounds: Wheezing and rhonchi present.   Neurological:      Mental Status: She is alert and oriented to person, place and time. Mental status is at baseline. She is calm.      Motor: Strength full and intact in all extremities.   Genitourinary/Anorectal:  Beltran  present.          Diagnostic Studies      EKG: NSR  Imaging:  I have personally reviewed pertinent reports.       Medications:  Scheduled PRN   azithromycin, 500 mg, Q24H  cefepime, 2,000 mg, Q12H  chlorhexidine, 15 mL, Q12H RUTH  heparin (porcine), 5,000 Units, Q8H RUTH  insulin lispro, 2-12 Units, Q6H RUTH  ipratropium, 0.5 mg, TID  levalbuterol, 1.25 mg, TID  methylPREDNISolone sodium succinate, 60 mg, Q12H RUTH  pantoprazole, 40 mg, Q24H RUTH  senna-docusate sodium, 2 tablet, HS  sodium chloride, 4 mL, TID  vancomycin, 750 mg, Q24H      acetaminophen, 650 mg, Q6H PRN  benzonatate, 100 mg, TID PRN  dextromethorphan-guaiFENesin, 10 mL, Q4H PRN  ondansetron, 4 mg, Q6H PRN       Continuous          Labs:    CBC    Recent Labs     01/04/24  0353   WBC 19.94*   HGB 10.7*   HCT 33.1*        BMP    Recent Labs     01/03/24  1542 01/04/24  0354   SODIUM 123* 125*   K 4.1 4.5   CL 92* 93*   CO2 22 23   AGAP 9 9   BUN 39* 44*   CREATININE 1.58* 1.59*   CALCIUM 8.8 8.6       Coags    No recent results     Additional Electrolytes  Recent Labs     01/03/24  1542 01/04/24  0354   MG 2.2 2.4   PHOS 3.0 3.6   CAIONIZED 1.15 1.17          Blood Gas    No recent results  No recent results LFTs  Recent Labs     01/04/24  0354   ALT 53*   AST 28   ALKPHOS 51   ALB 3.6   TBILI 0.40       Infectious  Recent Labs     01/04/24  0354   PROCALCITONI 0.29*     Glucose  Recent Labs     01/03/24  1542 01/04/24  0354   GLUC 226* 220*               Non-Critical Care Time Statement: I have spent a total time of 30 minutes in caring for this patient including Instructions for management and Documenting in the medical record.     TARUN Vick

## 2024-01-05 NOTE — ASSESSMENT & PLAN NOTE
Lab Results   Component Value Date    HGBA1C 6.8 (H) 01/01/2024       Recent Labs     01/04/24  0605 01/04/24  1127 01/04/24  1748 01/04/24  2336   POCGLU 205* 176* 141* 162*         Blood Sugar Average: Last 72 hrs:  (P) 180.2355406574145917    Does not appear to take antihyperglycemic agents as outpatient    Plan:  Continue SSI w/fingersticks Q6H  Goal  - 180

## 2024-01-05 NOTE — NUTRITION
"   01/05/24 1352   Biochemical Data,Medical Tests, and Procedures   Biochemical Data/Medical Tests/Procedures Lab values reviewed;Meds reviewed   Labs (Comment) 1/4/2024 glucose 174, sodium 134, BUN 48, creatinine 1.41   Meds (Comment) Heparin, insulin, Protonix, senna   Nutrition-Focused Physical Exam   Nutrition-Focused Physical Exam Findings RN skin assessment reviewed;Edema;No skin issues documented  (Trace bilateral lower extremity edema)   Medical-Related Concerns PMH reviewed   Current PO Intake   Current Diet Order Mechanical soft diet, thin liquids   Current Meal Intake Suboptimal, but improving   Estimated calorie intake compared to estimated need Nutrient needs not quite met as p.o. diet recently initiated.   PES Statement   Problem Continue previous diagnosis   Recommendations/Interventions   Malnutrition/BMI Present No   Summary Nutrition follow-up assessment.  Extubated 1/4.  Failed dysphagia nursing screen 1/4.  Evaluated by ST 1/4 recommending n.p.o. with aspiration precautions in place. Diet advanced to Mechanical Soft, thin liquids today.  Met with patient and family at bedside.  Patient reports she is \"not too hungry.\"  Usually has 2 meals daily.  Has fruit in between meals twice daily.  Banana, applesauce, or pear.  NKFA.  Denies dysphagia.  Patient cooks and grocery shops.  She reports living alone.  She reports her usual body weight 187#.  Discussed diet advancement per tolerance/safety.  Patient verbalizes understanding.  Offers no nutrition questions at this time.  Will add CCD 2 diet restriction.  RD to monitor p.o. intakes at follow-up.   Interventions/Recommendations Adjust diet order;Monitor I & O's   Education Assessment   Education Patient/caregiver not appropriate for education at this time   Patient Nutrition Goals   Goal Adequate intake;Glucose WNL       "

## 2024-01-05 NOTE — PROGRESS NOTES
Pastoral Care Progress Note    2024  Patient: Dionne Zuluaga : 1931  Admission Date & Time: 2024 1610  MRN: [id number] [facility]: [id number]    CH initiated support visit to pt and family. Pt received CH upright in chair, daughter bedside.  Pt and family did not identify any pressing emotional or spiritual needs at this time.   CH shared availability and encouraged family to ask for her if she could be of service.               Chaplaincy Interventions Utilized:   Empowerment: Encouraged assertiveness    Exploration: Explored spiritual needs & resources     24 1400   Clinical Encounter Type   Visited With Patient and family together  (daughter)   Routine Visit Introduction

## 2024-01-05 NOTE — ASSESSMENT & PLAN NOTE
Met sepsis criteria on admission   In setting of pneumonia   Blood cultures NGTD  UA w/out evidence of infection  Urine strep/legionella negative    Plan:  Continues on IV Cefepime/Vancomycin/Azithromycin as noted above  Monitor fever and WBC curve  Trend procalcitonin and follow-up cutlures

## 2024-01-05 NOTE — PLAN OF CARE
Problem: PAIN - ADULT  Goal: Verbalizes/displays adequate comfort level or baseline comfort level  Description: Interventions:  - Encourage patient to monitor pain and request assistance  - Assess pain using appropriate pain scale  - Administer analgesics based on type and severity of pain and evaluate response  - Implement non-pharmacological measures as appropriate and evaluate response  - Consider cultural and social influences on pain and pain management  - Notify physician/advanced practitioner if interventions unsuccessful or patient reports new pain  Outcome: Progressing     Problem: INFECTION - ADULT  Goal: Absence or prevention of progression during hospitalization  Description: INTERVENTIONS:  - Assess and monitor for signs and symptoms of infection  - Monitor lab/diagnostic results  - Monitor all insertion sites, i.e. indwelling lines, tubes, and drains  - Monitor endotracheal if appropriate and nasal secretions for changes in amount and color  - Bricelyn appropriate cooling/warming therapies per order  - Administer medications as ordered  - Instruct and encourage patient and family to use good hand hygiene technique  - Identify and instruct in appropriate isolation precautions for identified infection/condition  Outcome: Progressing     Problem: SAFETY ADULT  Goal: Patient will remain free of falls  Description: INTERVENTIONS:  - Educate patient/family on patient safety including physical limitations  - Instruct patient to call for assistance with activity   - Consult OT/PT to assist with strengthening/mobility   - Keep Call bell within reach  - Keep bed low and locked with side rails adjusted as appropriate  - Keep care items and personal belongings within reach  - Initiate and maintain comfort rounds  - Make Fall Risk Sign visible to staff  - Offer Toileting every 2 Hours, in advance of need  - Initiate/Maintain bed alarm  - Obtain necessary fall risk management equipment:   - Apply yellow socks and  bracelet for high fall risk patients  - Consider moving patient to room near nurses station  Outcome: Progressing  Goal: Maintain or return to baseline ADL function  Description: INTERVENTIONS:  -  Assess patient's ability to carry out ADLs; assess patient's baseline for ADL function and identify physical deficits which impact ability to perform ADLs (bathing, care of mouth/teeth, toileting, grooming, dressing, etc.)  - Assess/evaluate cause of self-care deficits   - Assess range of motion  - Assess patient's mobility; develop plan if impaired  - Assess patient's need for assistive devices and provide as appropriate  - Encourage maximum independence but intervene and supervise when necessary  - Involve family in performance of ADLs  - Assess for home care needs following discharge   - Consider OT consult to assist with ADL evaluation and planning for discharge  - Provide patient education as appropriate  Outcome: Progressing  Goal: Maintains/Returns to pre admission functional level  Description: INTERVENTIONS:  - Perform AM-PAC 6 Click Basic Mobility/ Daily Activity assessment daily.  - Set and communicate daily mobility goal to care team and patient/family/caregiver.   - Collaborate with rehabilitation services on mobility goals if consulted  - Perform Range of Motion 3 times a day.  - Reposition patient every 2 hours.  - Dangle patient 3 times a day  - Stand patient 3 times a day  - Ambulate patient 3 times a day  - Out of bed for meals   - Out of bed for toileting  - Record patient progress and toleration of activity level   Outcome: Progressing     Problem: DISCHARGE PLANNING  Goal: Discharge to home or other facility with appropriate resources  Description: INTERVENTIONS:  - Identify barriers to discharge w/patient and caregiver  - Arrange for needed discharge resources and transportation as appropriate  - Identify discharge learning needs (meds, wound care, etc.)  - Arrange for interpretive services to assist  at discharge as needed  - Refer to Case Management Department for coordinating discharge planning if the patient needs post-hospital services based on physician/advanced practitioner order or complex needs related to functional status, cognitive ability, or social support system  Outcome: Progressing     Problem: Knowledge Deficit  Goal: Patient/family/caregiver demonstrates understanding of disease process, treatment plan, medications, and discharge instructions  Description: Complete learning assessment and assess knowledge base.  Interventions:  - Provide teaching at level of understanding  - Provide teaching via preferred learning methods  Outcome: Progressing     Problem: Nutrition/Hydration-ADULT  Goal: Nutrient/Hydration intake appropriate for improving, restoring or maintaining nutritional needs  Description: Monitor and assess patient's nutrition/hydration status for malnutrition. Collaborate with interdisciplinary team and initiate plan and interventions as ordered.  Monitor patient's weight and dietary intake as ordered or per policy. Utilize nutrition screening tool and intervene as necessary. Determine patient's food preferences and provide high-protein, high-caloric foods as appropriate.     INTERVENTIONS:  - Monitor oral intake, urinary output, labs, and treatment plans  - Assess nutrition and hydration status and recommend course of action  - Evaluate amount of meals eaten  - Assist patient with eating if necessary   - Allow adequate time for meals  - Recommend/ encourage appropriate diets, oral nutritional supplements, and vitamin/mineral supplements  - Order, calculate, and assess calorie counts as needed  - Recommend, monitor, and adjust tube feedings and TPN/PPN based on assessed needs  - Assess need for intravenous fluids  - Provide specific nutrition/hydration education as appropriate  - Include patient/family/caregiver in decisions related to nutrition  Outcome: Progressing     Problem:  Prexisting or High Potential for Compromised Skin Integrity  Goal: Skin integrity is maintained or improved  Description: INTERVENTIONS:  - Identify patients at risk for skin breakdown  - Assess and monitor skin integrity  - Assess and monitor nutrition and hydration status  - Monitor labs   - Assess for incontinence   - Turn and reposition patient  - Assist with mobility/ambulation  - Relieve pressure over bony prominences  - Avoid friction and shearing  - Provide appropriate hygiene as needed including keeping skin clean and dry  - Evaluate need for skin moisturizer/barrier cream  - Collaborate with interdisciplinary team   - Patient/family teaching  - Consider wound care consult   Outcome: Progressing     Problem: SAFETY,RESTRAINT: NV/NON-SELF DESTRUCTIVE BEHAVIOR  Goal: Remains free of harm/injury (restraint for non violent/non self-detsructive behavior)  Description: INTERVENTIONS:  - Instruct patient/family regarding restraint use   - Assess and monitor physiologic and psychological status   - Provide interventions and comfort measures to meet assessed patient needs   - Identify and implement measures to help patient regain control  - Assess readiness for release of restraint   Outcome: Progressing  Goal: Returns to optimal restraint-free functioning  Description: INTERVENTIONS:  - Assess the patient's behavior and symptoms that indicate continued need for restraint  - Identify and implement measures to help patient regain control  - Assess readiness for release of restraint   Outcome: Progressing     Problem: RESPIRATORY - ADULT  Goal: Achieves optimal ventilation and oxygenation  Description: INTERVENTIONS:  - Assess for changes in respiratory status  - Assess for changes in mentation and behavior  - Position to facilitate oxygenation and minimize respiratory effort  - Oxygen administered by appropriate delivery if ordered  - Initiate smoking cessation education as indicated  - Encourage broncho-pulmonary  hygiene including cough, deep breathe, Incentive Spirometry  - Assess the need for suctioning and aspirate as needed  - Assess and instruct to report SOB or any respiratory difficulty  - Respiratory Therapy support as indicated  Outcome: Progressing

## 2024-01-05 NOTE — QUICK NOTE
Patient deemed acceptable for med-surg level of care. [facility] will assume care on 1/6/24. Please refer to progress note from this same date of service from our critical care colleagues for on going plan of care.

## 2024-01-05 NOTE — CASE MANAGEMENT
Case Management Assessment & Discharge Planning Note    Patient name Dionne Zuluaga  Location ICU 12/ICU 12- MRN [id number]  : 1931 Date 2024       Current Admission Date: 2024  Current Admission Diagnosis:Acute respiratory failure with hypoxia and hypercapnia (HCC)   Patient Active Problem List    Diagnosis Date Noted    Dysphagia 2024    Acute respiratory failure with hypoxia and hypercapnia (HCC) 2024    Hyponatremia 2024    Severe sepsis (HCC) 2024    Elevated d-dimer 2024    Intrinsic eczema 2016    CKD stage G3b/A1, GFR 30-44 and albumin creatinine ratio <30 mg/g (HCC) 2013    Type 2 diabetes mellitus with stage 3 chronic kidney disease, without long-term current use of insulin ([facility]) 10/25/2012    Tobacco use disorder 2012    Depression, major, in remission ([facility]) 2012    Hematuria 2012    Hypertensive emergency 2012    Mixed hyperlipidemia 10/31/2011      LOS (days): 4  Geometric Mean LOS (GMLOS) (days): 5.1  Days to GMLOS:1.3     OBJECTIVE:    Risk of Unplanned Readmission Score: 14.59         Current admission status: Inpatient  Referral Reason: Other (Discharge planning)    Preferred Pharmacy:   [id number] - [place], [place] - [address] [address]  Phone: [phone] Fax: [phone]    [id number] - [place], [place] - [address]  [address] [address]  Phone: [phone] Fax: [phone]    Primary Care Provider: Megan Barros DO    Primary Insurance: MEDICARE  Secondary Insurance: [facility] BLUE SHIELD    ASSESSMENT:  Active Health Care Proxies    There are no active Health Care Proxies on file.       Advance Directives  Does patient have a Health Care POA?: Yes  Does patient have Advance Directives?: Yes  Advance Directives: Living will, Power of  for health care  Primary Contact: Cheri Mendez - daughter          Readmission Root Cause  30 Day Readmission: No    Patient Information  Admitted from:: Home  Mental Status: Alert  During Assessment patient was accompanied by: Not accompanied during assessment  Assessment information provided by:: Patient  Primary Caregiver: Self  Support Systems: Self, Children  County of Residence: [place]  What city do you live in?: [place]  Home entry access options. Select all that apply.: Stairs  Number of steps to enter home.: 2  Do the steps have railings?: Yes  Type of Current Residence: Other (Comment) (1 story home)  Living Arrangements: Lives Alone  Is patient a ?: No    Activities of Daily Living Prior to Admission  Functional Status: Independent  Completes ADLs independently?: Yes  Ambulates independently?: Yes  Does patient use assisted devices?: No  Does patient currently own DME?: Yes  What DME does the patient currently own?: Walker, Straight Cane  Does patient have a history of Outpatient Therapy (PT/OT)?: No  Does the patient have a history of Short-Term Rehab?: No  Does patient have a history of HHC?: No  Does patient currently have HHC?: No         Patient Information Continued  Income Source: Pension/senior care  Does patient have prescription coverage?: Yes  Does patient receive dialysis treatments?: No  Does patient have a history of substance abuse?: No  Does patient have a history of Mental Health Diagnosis?: No         Means of Transportation  Means of Transport to [facility]:: Family transport      Housing Stability: Low Risk  (1/5/2024)    Housing Stability Vital Sign     Unable to Pay for Housing in the Last Year: No     Number of Places Lived in the Last Year: 1     Unstable Housing in the Last Year: No   Food Insecurity: No Food Insecurity (1/5/2024)    Hunger Vital Sign     Worried About Running Out of Food in the Last Year: Never true     Ran Out of Food in the Last Year: Never true   Transportation Needs: No Transportation Needs (1/5/2024)    PRAPARE -  Transportation     Lack of Transportation (Medical): No     Lack of Transportation (Non-Medical): No   Utilities: Not At Risk (1/5/2024)    [facility] Utilities     Threatened with loss of utilities: No       DISCHARGE DETAILS:    Discharge planning discussed with:: Patient, daughter Cheri  Freedom of Choice: Yes     CM contacted family/caregiver?: Yes  Were Treatment Team discharge recommendations reviewed with patient/caregiver?: Yes  Did patient/caregiver verbalize understanding of patient care needs?: Yes  Were patient/caregiver advised of the risks associated with not following Treatment Team discharge recommendations?: Yes    Contacts  Patient Contacts: Cheri Mendez - daughter  Relationship to Patient:: Family  Contact Method: In Person  Reason/Outcome: Discharge Planning      DME Referral Provided  Referral made for DME?: No    Treatment Team Recommendation: Short Term Rehab     Transport at Discharge : Wheelchair van           Additional Comments: CM met with pt and daughter Cheri at bedside in ICU to discuss discharge planning. STR recommended. Pt and daughter request time to consider STR and discuss with other family members before giving a decision. Pt prefers to go home but may not be able due to condition and lack of 24 hour support. CM to follow.

## 2024-01-05 NOTE — ASSESSMENT & PLAN NOTE
Admitted on 01/01 for progressive SOB x6D - transferred to ICU  for respiratory distress  Suspect this is 2/2 pneumonia w/likely COPD exacerbation though no formal COPD diagnosis per chart review  Initial CT imaging revealed no PE, however did reveal attenuation of multiple small airways in right lower lobe which could be related to infectious or inflammatory bronchitis, aspiration, and/or mucous plugging  COVID/FLU/RSV negative  Urine strep/legionella negative  Pt intubated 1/2  Extubated 1/4    Plan:  Sputum Culture with mixed philly  Currently on IV Solu Medrol 60 mg Q12  Continue scheduled Xopenex/Atrovent HTS nebs  Continue IV Cefepime/Vancomycin/Azithromycin, now D4  Aggressive pulmonary hygiene

## 2024-01-05 NOTE — QUICK NOTE
Patient deemed appropriate for transfer to med surg. 89 year old female with history of COPD presents with pneumonia and acute exacerbation of COPD. Required intubation for hypoxia and hypercapnia. Extubated 1/4 to 3L NC. Stable overnight.    Follow up to be completed prior to discharge:  Continue to wean steroids with eventual prednisone taper  Continue Cefepime to complete 7 day course   Formal speech evaluation for concern for dysphagia   Pulmonary hygiene for airway clearance   PT/OT     Family updated at bedside.   Discussed with Dr. Iniguez. Please call with any questions.

## 2024-01-05 NOTE — ASSESSMENT & PLAN NOTE
Concern for chronic aspiration as evidenced on imaging  Now n.p.o. after failing speech evaluation  Speech evaluation this morning

## 2024-01-05 NOTE — OCCUPATIONAL THERAPY NOTE
Occupational Therapy Evaluation     Patient Name: Dionne Zuluaga  Today's Date: 1/5/2024  Problem List  Principal Problem:    Acute respiratory failure with hypoxia and hypercapnia (HCC)  Active Problems:    CKD stage G3b/A1, GFR 30-44 and albumin creatinine ratio <30 mg/g (HCC)    Depression, major, in remission (HCC)    Hypertensive emergency    Mixed hyperlipidemia    Tobacco use disorder    Type 2 diabetes mellitus with stage 3 chronic kidney disease, without long-term current use of insulin (HCC)    Hyponatremia    Severe sepsis (HCC)    Elevated d-dimer    Dysphagia    Past Medical History  Past Medical History:   Diagnosis Date    Hyperlipidemia     Hypertension      Past Surgical History  Past Surgical History:   Procedure Laterality Date    HYSTERECTOMY  1974 01/05/24 1235   OT Last Visit   OT Visit Date 01/05/24   Note Type   Note type Evaluation   Additional Comments Pt seen as a co-eval with PT due to the patient's co-morbidities, clinically unstable presentation, and present impairments which are a regression from the patient's baseline.   Pain Assessment   Pain Assessment Tool 0-10   Pain Score No Pain   Restrictions/Precautions   Weight Bearing Precautions Per Order No   Braces or Orthoses   (none reported)   Other Precautions Multiple lines;Telemetry;O2;Fall Risk;Pain  (3L O2 via NC, does not wear at baseline)   Home Living   Type of Home House   Home Layout One level;Stairs to enter with rails  (1 +1 PAULINO)   Bathroom Shower/Tub Tub/shower unit   Bathroom Toilet Standard   Bathroom Equipment Shower chair  (has not been utilizing shower chair)   Bathroom Accessibility Accessible   Home Equipment   (no AD used at baseline)   Prior Function   Level of [place] Independent with ADLs;Independent with functional mobility;Independent with IADLS   Lives With Spouse   Receives Help From Family  (2 daugthers)   IADLs Independent with driving;Independent with meal prep;Independent with medication  "management   Falls in the last 6 months 0  (pt denies)   Vocational Retired   Subjective   Subjective \"It feels good to get up and walk\"   ADL   Where Assessed Chair   UB Bathing Assistance 5  Supervision/Setup   LB Bathing Assistance 4  Minimal Assistance   UB Dressing Assistance 5  Supervision/Setup   LB Dressing Assistance 4  Minimal Assistance   LB Dressing Deficit Don/doff R sock;Don/doff L sock  (d/t chair height and mobility)   Bed Mobility   Additional Comments DNT; pt seated in recliner upon arrival and conclusion of session   Transfers   Sit to Stand 4  Minimal assistance   Additional items Assist x 1;Armrests;Increased time required;Verbal cues   Stand to Sit 4  Minimal assistance   Additional items Assist x 1;Armrests;Increased time required;Verbal cues   Additional Comments RW used; VC for safe hand placement, proper body mechanics and RW management during transfers   Functional Mobility   Functional Mobility 4  Minimal assistance   Additional Comments Pt completed short distance ADL mobility around room > hallway at min A x1 for balance and safety concerns. SpO2 ranged 88-94% during mobility on 3L O2, increased to 95% during therapeutic rest breaks  (Therapist educated patient on pursed lip breathing techniques)   Additional items Rolling walker   Balance   Static Sitting Good   Dynamic Sitting Fair +   Static Standing Fair   Dynamic Standing Fair -   Ambulatory Fair -   Activity Tolerance   Activity Tolerance Patient limited by fatigue   Medical Staff Made Aware Yes, CM made aware of d/c recs   Nurse Made Aware Yes, nursing staff made aware of session outcomes   RUE Assessment   RUE Assessment WFL   RUE Strength   RUE Overall Strength   (3+/5)   LUE Assessment   LUE Assessment WFL   LUE Strength   LUE Overall Strength   (3+/5)   Cognition   Overall Cognitive Status WFL   Arousal/Participation Responsive;Alert;Cooperative   Attention Within functional limits   Orientation Level Oriented X4   Memory " Decreased recall of precautions;Decreased recall of recent events   Following Commands Follows one step commands without difficulty   Comments Pt agreeable to OT evaluation, pleasant   Assessment   Limitation Decreased ADL status;Decreased UE strength;Decreased Safe judgement during ADL;Decreased endurance;Decreased self-care trans;Decreased high-level ADLs   Prognosis Good   Assessment Pt is a 89 y.o. female seen for OT evaluation s/p admit to [facility] on 1/1/2024 w/ Acute respiratory failure with hypoxia and hypercapnia (HCC).  Comorbidities affecting pt's functional performance at time of assessment include: CKD, depression, hypertensive emergency, type 2 diabetes, severe sepsis, dysphagia. Personal factors affecting pt at time of IE include:steps to enter environment, difficulty performing ADLS, difficulty performing IADLS , limited insight into deficits, decreased initiation and engagement , and health management . Prior to admission, pt was I with ADLs and IADLs. Upon evaluation: the following deficits impact occupational performance: weakness, decreased strength, decreased balance, decreased tolerance, impaired problem solving, and decreased safety awareness. Pt to benefit from continued skilled OT tx while in the hospital to address deficits as defined above and maximize level of functional independence w ADL's and functional mobility. Occupational Performance areas to address include: grooming, bathing/shower, toilet hygiene, dressing, functional mobility, community mobility, and clothing management. From OT standpoint, recommendation at time of d/c would with moderate intensity OT resources.   Goals   Patient Goals to feel better   Plan   Treatment Interventions ADL retraining;Functional transfer training;UE strengthening/ROM;Endurance training;Patient/family training;Energy conservation;Activityengagement   Goal Expiration Date 01/15/24   OT Treatment Day 0   OT Frequency 3-5x/wk   Discharge  Recommendation   Rehab Resource Intensity Level, OT II (Moderate Resource Intensity)   Additional Comments  The patient's raw score on the AM-PAC Daily Activity inpatient short form is 17, standardized score is 37.26, less than 39.4. Patients at this level are likely to benefit from discharge with moderate intensity OT resources. Please refer to the recommendation of the Occupational Therapist for safe discharge planning.   AM-PAC Daily Activity Inpatient   Lower Body Dressing 2   Bathing 2   Toileting 2   Upper Body Dressing 3   Grooming 4   Eating 4   Daily Activity Raw Score 17   Daily Activity Standardized Score (Calc for Raw Score >=11) 37.26   AM-PAC Applied Cognition Inpatient   Following a Speech/Presentation 3   Understanding Ordinary Conversation 4   Taking Medications 4   Remembering Where Things Are Placed or Put Away 3   Remembering List of 4-5 Errands 3   Taking Care of Complicated Tasks 2   Applied Cognition Raw Score 19   Applied Cognition Standardized Score 39.77     GOALS:    Pt will achieve the following within specified time frame: LTG  Pt will achieve the following goals within 10 days    *ADL transfers with (S) for inc'd independence with ADLs/purposeful tasks    *UB ADL with (I) for inc'd independence with self cares    *LB ADL with (S) using AE prn for inc'd independence with self cares    *Toileting with (S) for clothing management and hygiene for return to PLOF with personal care    *Increase static stand balance and dyn stand balance to F+ for inc'd safety with standing purposeful tasks    *Increase stand tolerance x7m for inc'd tolerance with standing purposeful tasks    *Bed mobility- (S) for inc'd independence to manage own comfort and initiate EOB & OOB purposeful tasks    *Participate in 10-15m UE therex to increase overall stamina/activity tolerance for purposeful tasks      Arianna Elaine MS, OTR/L

## 2024-01-05 NOTE — PLAN OF CARE
Problem: PAIN - ADULT  Goal: Verbalizes/displays adequate comfort level or baseline comfort level  Description: Interventions:  - Encourage patient to monitor pain and request assistance  - Assess pain using appropriate pain scale  - Administer analgesics based on type and severity of pain and evaluate response  - Implement non-pharmacological measures as appropriate and evaluate response  - Consider cultural and social influences on pain and pain management  - Notify physician/advanced practitioner if interventions unsuccessful or patient reports new pain  1/5/2024 1037 by Porfirio Isidro RN  Outcome: Progressing  1/5/2024 1037 by Porfirio Isidro RN  Outcome: Progressing     Problem: INFECTION - ADULT  Goal: Absence or prevention of progression during hospitalization  Description: INTERVENTIONS:  - Assess and monitor for signs and symptoms of infection  - Monitor lab/diagnostic results  - Monitor all insertion sites, i.e. indwelling lines, tubes, and drains  - Monitor endotracheal if appropriate and nasal secretions for changes in amount and color  - [place] appropriate cooling/warming therapies per order  - Administer medications as ordered  - Instruct and encourage patient and family to use good hand hygiene technique  - Identify and instruct in appropriate isolation precautions for identified infection/condition  1/5/2024 1037 by Porfirio Isidro RN  Outcome: Progressing  1/5/2024 1037 by Porfirio Isidro RN  Outcome: Progressing     Problem: Knowledge Deficit  Goal: Patient/family/caregiver demonstrates understanding of disease process, treatment plan, medications, and discharge instructions  Description: Complete learning assessment and assess knowledge base.  Interventions:  - Provide teaching at level of understanding  - Provide teaching via preferred learning methods  1/5/2024 1037 by Porfirio Isidro RN  Outcome: Progressing  1/5/2024 1037 by Porfirio Isidro RN  Outcome: Progressing     Problem:  SAFETY,RESTRAINT: NV/NON-SELF DESTRUCTIVE BEHAVIOR  Goal: Remains free of harm/injury (restraint for non violent/non self-detsructive behavior)  Description: INTERVENTIONS:  - Instruct patient/family regarding restraint use   - Assess and monitor physiologic and psychological status   - Provide interventions and comfort measures to meet assessed patient needs   - Identify and implement measures to help patient regain control  - Assess readiness for release of restraint   1/5/2024 1037 by Porfirio Isidro RN  Outcome: Completed  1/5/2024 1037 by Porfirio Isidro RN  Outcome: Progressing  Goal: Returns to optimal restraint-free functioning  Description: INTERVENTIONS:  - Assess the patient's behavior and symptoms that indicate continued need for restraint  - Identify and implement measures to help patient regain control  - Assess readiness for release of restraint   1/5/2024 1037 by Porfirio Isidro RN  Outcome: Completed  1/5/2024 1037 by Porfirio Isidro RN  Outcome: Progressing     Problem: Prexisting or High Potential for Compromised Skin Integrity  Goal: Skin integrity is maintained or improved  Description: INTERVENTIONS:  - Identify patients at risk for skin breakdown  - Assess and monitor skin integrity  - Assess and monitor nutrition and hydration status  - Monitor labs   - Assess for incontinence   - Turn and reposition patient  - Assist with mobility/ambulation  - Relieve pressure over bony prominences  - Avoid friction and shearing  - Provide appropriate hygiene as needed including keeping skin clean and dry  - Evaluate need for skin moisturizer/barrier cream  - Collaborate with interdisciplinary team   - Patient/family teaching  - Consider wound care consult   1/5/2024 1037 by Porfirio Isidro RN  Outcome: Progressing  1/5/2024 1037 by Porfirio Isidro RN  Outcome: Progressing     Problem: Nutrition/Hydration-ADULT  Goal: Nutrient/Hydration intake appropriate for improving, restoring or maintaining nutritional  needs  Description: Monitor and assess patient's nutrition/hydration status for malnutrition. Collaborate with interdisciplinary team and initiate plan and interventions as ordered.  Monitor patient's weight and dietary intake as ordered or per policy. Utilize nutrition screening tool and intervene as necessary. Determine patient's food preferences and provide high-protein, high-caloric foods as appropriate.     INTERVENTIONS:  - Monitor oral intake, urinary output, labs, and treatment plans  - Assess nutrition and hydration status and recommend course of action  - Evaluate amount of meals eaten  - Assist patient with eating if necessary   - Allow adequate time for meals  - Recommend/ encourage appropriate diets, oral nutritional supplements, and vitamin/mineral supplements  - Order, calculate, and assess calorie counts as needed  - Recommend, monitor, and adjust tube feedings and TPN/PPN based on assessed needs  - Assess need for intravenous fluids  - Provide specific nutrition/hydration education as appropriate  - Include patient/family/caregiver in decisions related to nutrition  1/5/2024 1037 by Porfirio Isidro RN  Outcome: Progressing  1/5/2024 1037 by Porfirio Isidro RN  Outcome: Progressing     Problem: RESPIRATORY - ADULT  Goal: Achieves optimal ventilation and oxygenation  Description: INTERVENTIONS:  - Assess for changes in respiratory status  - Assess for changes in mentation and behavior  - Position to facilitate oxygenation and minimize respiratory effort  - Oxygen administered by appropriate delivery if ordered  - Initiate smoking cessation education as indicated  - Encourage broncho-pulmonary hygiene including cough, deep breathe, Incentive Spirometry  - Assess the need for suctioning and aspirate as needed  - Assess and instruct to report SOB or any respiratory difficulty  - Respiratory Therapy support as indicated  1/5/2024 1037 by Porfirio Isidro RN  Outcome: Progressing  1/5/2024 1037 by Porfirio AL  REBECA Isidro  Outcome: Progressing

## 2024-01-05 NOTE — PROGRESS NOTES
Progress Note - Palliative & Supportive Care  Dionne Zuluaga  89 y.o.  female  ICU 12/ICU 12-01   MRN: [id number]  Encounter: [id number]     Assessment  Present on Admission:   Hypertensive emergency   Type 2 diabetes mellitus with stage 3 chronic kidney disease, without long-term current use of insulin (HCC)   Acute respiratory failure with hypoxia and hypercapnia (HCC)   Hyponatremia   Severe sepsis (HCC)   Elevated d-dimer   CKD stage G3b/A1, GFR 30-44 and albumin creatinine ratio <30 mg/g (HCC)   Mixed hyperlipidemia   Tobacco use disorder   Depression, major, in remission (HCC)     Active issues addressed today:  Acute respiratory failure with hypoxia and hypercapnia  Severe sepsis  Palliative care by specialist  Goals of care counseling    Plan  Symptom management  Defer to primary treatment team    2.   Goals  Level 1 code status. Full code. Disease focused care. No limits placed.   Patient to be downgraded to SLIM service, doing well  Goals remain the same as well as CODE status  Offered patient and family continued support if requested during hospital stay, she does not need outpatient palliative services.     3.  Social support  Patient is supported by daughters, granddaughter.   Supportive listening provided  Normalized experience of patient/family  Provided anxiety containment  Provided anticipatory guidance      4.  Follow up  Palliative Medicine will sign off today as goals of care are clear and symptoms are well managed at this time.  Please reach out to on-call provider via [employer] if questions or concerns arise.      Interval History  Chart reviewed before visit.  Doing well. She is stable on 3L O2 via NC. Resting comfortably in chair with no complaints. Being downgraded from ICU to SLIM service. Goals and code status reviewed and remain the same.     Current pain location(s): n/a  Pain Scale:   0  PRN Use of Pain Medications: n/a      Review of Systems  All other systems  "negative.    Medications    Current Facility-Administered Medications:     acetaminophen (TYLENOL) tablet 650 mg, 650 mg, Oral, Q6H PRN, TARUN Vasquez    benzonatate (TESSALON PERLES) capsule 100 mg, 100 mg, Oral, TID PRN, TARUN Vasquez    cefepime (MAXIPIME) IVPB (premix in dextrose) 2,000 mg 50 mL, 2,000 mg, Intravenous, Q12H, TARUN Vasquez, Last Rate: 100 mL/hr at 01/05/24 1109, 2,000 mg at 01/05/24 1109    chlorhexidine (PERIDEX) 0.12 % oral rinse 15 mL, 15 mL, Mouth/Throat, Q12H RUTH, TARUN Vasquez, 15 mL at 01/05/24 0931    dextromethorphan-guaiFENesin (ROBITUSSIN DM) oral syrup 10 mL, 10 mL, Oral, Q4H PRN, TARUN Vasquez, 10 mL at 01/01/24 2237    heparin (porcine) subcutaneous injection 5,000 Units, 5,000 Units, Subcutaneous, Q8H RUTH, TARUN Vasquez, 5,000 Units at 01/04/24 2128    insulin lispro (HumaLOG) 100 units/mL subcutaneous injection 2-12 Units, 2-12 Units, Subcutaneous, Q6H RUTH, 2 Units at 01/05/24 1229 **AND** Fingerstick Glucose (POCT), , , Q6H, TARUN Vasquez    levalbuterol (XOPENEX) inhalation solution 1.25 mg, 1.25 mg, Nebulization, TID, TARUN Vasquez, 1.25 mg at 01/05/24 0751    methylPREDNISolone sodium succinate (Solu-MEDROL) injection 60 mg, 60 mg, Intravenous, Q12H RUTH, TARUN Vasquez, 60 mg at 01/05/24 0931    ondansetron (ZOFRAN) injection 4 mg, 4 mg, Intravenous, Q6H PRN, TARUN Vasquez    pantoprazole (PROTONIX) injection 40 mg, 40 mg, Intravenous, Q24H RUTH, TARUN Vasquez, 40 mg at 01/05/24 0931    senna-docusate sodium (SENOKOT S) 8.6-50 mg per tablet 2 tablet, 2 tablet, Oral, HS, TARUN Vasquez, 2 tablet at 01/03/24 2133    Objective  /65   Pulse 69   Temp 98.6 °F (37 °C)   Resp 17   Ht 5' 3\" (1.6 m)   Wt 93.8 kg (206 lb 12.7 oz)   SpO2 94%   BMI 36.63 kg/m²   Physical Exam  Vitals reviewed.   Constitutional:       General: She is not in acute distress.     Appearance: " Normal appearance.   HENT:      Head: Normocephalic and atraumatic.      Right Ear: External ear normal.      Left Ear: External ear normal.   Cardiovascular:      Rate and Rhythm: Normal rate.   Pulmonary:      Effort: Pulmonary effort is normal. No respiratory distress.   Musculoskeletal:         General: Normal range of motion.   Skin:     General: Skin is warm and dry.      Findings: No rash.   Neurological:      Mental Status: She is alert and oriented to person, place, and time.   Psychiatric:         Mood and Affect: Mood normal.         Behavior: Behavior normal.         Lab Results: I have personally reviewed pertinent labs., CBC:   Lab Results   Component Value Date    WBC 19.20 (H) 01/05/2024    HGB 12.0 01/05/2024    HCT 36.7 01/05/2024    MCV 93 01/05/2024     01/05/2024    RBC 3.94 01/05/2024    MCH 30.5 01/05/2024    MCHC 32.7 01/05/2024    RDW 14.1 01/05/2024    MPV 9.9 01/05/2024   , CMP:   Lab Results   Component Value Date    SODIUM 134 (L) 01/05/2024    K 5.0 01/05/2024     01/05/2024    CO2 27 01/05/2024    BUN 48 (H) 01/05/2024    CREATININE 1.41 (H) 01/05/2024    CALCIUM 9.0 01/05/2024    EGFR 32 01/05/2024     Imaging Studies: I have personally reviewed pertinent reports.  VAS lower limb venous duplex study, complete bilateral    Result Date: 1/3/2024  Narrative:  [facility] REPORT CLINICAL: Indications: Patient presents with respiratory failure, elevated D-Dimer, shortness of breath and COPD. Assess for DVT. Risk Factors The patient has history of HTN, Diabetes (NIDDM (Diet)) and Hyperlipidemia.   CONCLUSION:  Impression: RIGHT LOWER LIMB: No evidence of acute or chronic deep vein thrombosis. No evidence of superficial thrombophlebitis noted. Doppler evaluation shows a normal response to augmentation maneuvers.. Popliteal, posterior tibial and anterior tibial arterial Doppler waveform's are triphasic/biphasic  LEFT LOWER LIMB: No evidence of acute or chronic deep vein  thrombosis. No evidence of superficial thrombophlebitis noted. Doppler evaluation shows a normal response to augmentation maneuvers. Popliteal, posterior tibial and anterior tibial arterial Doppler waveform's are triphasic/biphasic.  SIGNATURE: Electronically Signed by: DIXON CH MD on 2024-01-03 12:44:37 PM    XR chest portable ICU    Result Date: 1/3/2024  Narrative: CHEST INDICATION:  Intubation. COMPARISON: Chest radiograph and CT chest 1/2/2024 EXAM PERFORMED/VIEWS:  XR CHEST PORTABLE ICU  AP semierect Images: 2 FINDINGS: The patient is rotated to the right. Study limited by patient body habitus and portable technique. Monitoring wires and leads project over the chest. -ET tube tip terminates approximately 3 cm above the dean. -Enteric tube courses to the stomach, distal tip not visualized. Cardiomediastinal silhouette appears stable, noting calcified uncoiled thoracic aorta. Question mild pulmonary venous congestion with small left pleural effusion. Left retrocardiac opacity may represent volume loss. No pneumothorax. Paravertebral ossifications thoracic spine.     Impression: Life-support tubes as above. Question mild pulmonary venous congestion with possible small left pleural effusion and left retrocardiac atelectasis. Workstation performed: [id number]     XR chest portable ICU    Result Date: 1/2/2024  Narrative: CHEST INDICATION:   resp failure. COMPARISON: Chest x-ray and CT thorax previous day EXAM PERFORMED/VIEWS:  XR CHEST PORTABLE ICU FINDINGS: Cardiomediastinal silhouette appears unremarkable. Nonspecific interstitial thickening suggesting developing edema with retrocardiac atelectasis. No pneumothorax or pleural effusion. Osseous structures appear within normal limits for patient age.     Impression: Interstitial edema with development of retrocardiac opacity. Workstation performed: [id number]     XR chest 1 view portable    Result Date: 1/2/2024  Narrative: CHEST INDICATION:   cough,  wheezing, hx tobacco. COMPARISON:  None EXAM PERFORMED/VIEWS:  XR CHEST PORTABLE FINDINGS: Normal cardiac silhouette.  Aortic calcification is present. Minimal bibasilar subsegmental atelectasis, right greater than left. No pneumothorax, pulmonary edema, or large pleural effusion. Mild degenerative changes of bilateral shoulders. Multilevel thoracic spondylosis.     Impression: Minimal bibasilar subsegmental atelectasis. Workstation performed: [id number]     CTA ED chest PE Study    Result Date: 1/1/2024  Narrative: CTA - CHEST WITH IV CONTRAST - PULMONARY ANGIOGRAM INDICATION:   hypoxia, elevated ddimer. COMPARISON: Chest x-ray dated the same TECHNIQUE: CTA examination of the chest was performed using angiographic technique according to a protocol specifically tailored to evaluate for pulmonary embolism.  Multiplanar 2D reformatted images were created from the source data. In addition, coronal 3D MIP postprocessing was performed on the acquisition scanner. Radiation dose length product (DLP) for this visit:  606 mGy-cm .  This examination, like all CT scans performed in the [facility], was performed utilizing techniques to minimize radiation dose exposure, including the use of iterative reconstruction and automated exposure control. IV Contrast:  85 mL of iohexol (OMNIPAQUE) FINDINGS: PULMONARY ARTERIAL TREE: Some images are suboptimal secondary to respiratory motion which decreases sensitivity for evaluation of peripheral pulmonary emboli, subject to this, no central pulmonary embolism is seen. LUNGS:  There is attenuation of multiple small airways in the right lower lobe which could be related to infectious or inflammatory bronchitis, aspiration, and/or mucous plugging. Linear atelectasis in the bilateral lower lung fields. Visualized lungs otherwise appear grossly clear. PLEURA:  Unremarkable. HEART/GREAT VESSELS: Heart appears normal in size. Mild to moderate. Coronary atherosclerosis. Mitral  annular calcifications. Mild to moderate aortic atherosclerosis. Saccular aneurysm of the abdominal aorta at the level of the kidneys measuring approximately 0.7 cm in size (axial image 262, series 2 and coronal image 105, series 601). No thoracic aortic aneurysm. MEDIASTINUM AND LIA:  Unremarkable. CHEST WALL AND LOWER NECK:   Unremarkable. VISUALIZED STRUCTURES IN THE UPPER ABDOMEN: Several renal cysts are noted, largest is exophytic in the anterior left kidney measuring approximately 7 cm in size OSSEOUS STRUCTURES:  No acute fracture or destructive osseous lesion.     Impression: Some images are suboptimal secondary to respiratory motion which decreases sensitivity for evaluation of peripheral pulmonary emboli, subject to this, no central pulmonary embolism is seen. Attenuation of multiple small airways in the right lower lobe which could be related to infectious or inflammatory bronchitis, aspiration, and/or mucous plugging. Correlation with the patient's symptoms and laboratory values recommended Coronary atherosclerosis, aortic atherosclerosis, saccular aneurysm of the abdominal aorta at the level of the kidneys measuring approximately 0.7 cm in size (axial image 262, series 2 and coronal image 105, series 601). Other findings as above. Workstation performed: [id number]        EKG, Pathology, and Other Studies: I have personally reviewed pertinent reports.      Counseling / Coordination of Care  Total floor / unit time spent today 25 minutes. Greater than 50% of total time was spent with the patient and / or family counseling and / or coordinating of care. A description of the counseling / coordination of care: Chart reviewed,  provided medical updates, determined goals of care, provided anticipatory guidance, determined competency and POA/HCA, determined social/family support, provided psychosocial support. Reviewed with JAVIER, ICU team.    Ryan Deal PA-C  Palliative and Supportive  "[facility] [place]  Clinic/Answering Service: [phone]  You can find me on [employer]!     Portions of this document may have been created using dictation software and as such some \"sound alike\" terms may have been generated by the system. Do not hesitate to contact me with any questions or clarifications.    "

## 2024-01-05 NOTE — ASSESSMENT & PLAN NOTE
Lab Results   Component Value Date    EGFR 27 01/04/2024    EGFR 28 01/03/2024    EGFR 35 01/03/2024    CREATININE 1.59 (H) 01/04/2024    CREATININE 1.58 (H) 01/03/2024    CREATININE 1.30 01/03/2024     Baseline creatinine appears to be around 1.4 - 1.9    Plan:  Avoid nephrotoxic agents and hypotension  Trend renal indices  Strict I/O   Daily weights

## 2024-01-05 NOTE — ASSESSMENT & PLAN NOTE
Possibly in setting of decreased PO intake vs SIADH related to pneumonia vs outpatient HCTZ use    Plan:  Currently NPO   Frequent neuro checks

## 2024-01-05 NOTE — PLAN OF CARE
Problem: PHYSICAL THERAPY ADULT  Goal: Performs mobility at highest level of function for planned discharge setting.  See evaluation for individualized goals.  Description: Treatment/Interventions: Functional transfer training, LE strengthening/ROM, Therapeutic exercise, Endurance training, Elevations, Patient/family training, Equipment eval/education, Bed mobility, Gait training, Spoke to nursing  Equipment Recommended:  (continue RW use)       See flowsheet documentation for full assessment, interventions and recommendations.  Note: Prognosis: Good  Problem List: Decreased strength, Decreased endurance, Impaired balance, Decreased mobility, Impaired judgement, Decreased safety awareness, Obesity  Assessment: Pt is 89 y.o. female seen for high-complexity PT evaluation on 1/5/2024 s/p admit to [facility] on 1/1/2024 w/ Acute respiratory failure with hypoxia and hypercapnia (HCC). PT initially consulted to assess pt's functional mobility and d/c needs. Order placed for PT eval and tx, w/  out of bed to chair  order. PTA, pt lives c spouse in 1 SH c 1+1 PAULINO, amb s AD at baseline, (I) ADLs. At time of eval, pt requires min Ax1 for transfers and amb x 30 ft, 26 ft trials with use of RW. Upon evaluation, pt presenting with impaired functional mobility d/t decreased strength, decreased endurance, impaired balance, decreased mobility, impaired judgement, decreased safety awareness, and obesity c BMI of 36.63 kg/m2. Pertinent PMHx and current co-morbidities affecting pt's physical performance at time of assessment include: CKD stage G3b/A1, depression, HLD, tobacco use disorder, DM type 2, hyponatremia, severe sepsis, elevated d-dimer, dysphagia. Personal factors affecting pt at time of eval include: limited insight into impairments and advanced age . Objective measures performed on IE also reveal limitations: AM-PAC 6-Clicks: 16/24. Pt's clinical presentation is currently unstable/unpredictable seen in pt's  presentation of abnormal lab value(s), ongoing medical assessment, and on telemetry monitoring, supplemental O2 requirement when pt normally does not wear O2. Overall, pt's rehab potential and prognosis to return to PLOF is good as impacted by objective findings, warranting pt to receive further skilled PT interventions to address identified impairments, activity limitation(s), and participation restriction(s). Pt is to benefit from continued PT tx to address deficits as defined above and maximize level of functional independent mobility and consistency in order for pt to improve activity tolerance. From PT/mobility standpoint, recommendation at time of d/c would be level 2, moderate resource intensity PT services, pending pt's overall functional progress in order to facilitate return to PLOF and abilities.  Barriers to Discharge: Inaccessible home environment     Rehab Resource Intensity Level, PT: II (Moderate Resource Intensity)    See flowsheet documentation for full assessment.

## 2024-01-05 NOTE — PLAN OF CARE
Problem: OCCUPATIONAL THERAPY ADULT  Goal: Performs self-care activities at highest level of function for planned discharge setting.  See evaluation for individualized goals.  Description: Treatment Interventions: ADL retraining, Functional transfer training, UE strengthening/ROM, Endurance training, Patient/family training, Energy conservation, Activityengagement      See flowsheet documentation for full assessment, interventions and recommendations.   Note: Limitation: Decreased ADL status, Decreased UE strength, Decreased Safe judgement during ADL, Decreased endurance, Decreased self-care trans, Decreased high-level ADLs  Prognosis: Good  Assessment: Pt is a 89 y.o. female seen for OT evaluation s/p admit to [facility] on 1/1/2024 w/ Acute respiratory failure with hypoxia and hypercapnia (HCC).  Comorbidities affecting pt's functional performance at time of assessment include: CKD, depression, hypertensive emergency, type 2 diabetes, severe sepsis, dysphagia. Personal factors affecting pt at time of IE include:steps to enter environment, difficulty performing ADLS, difficulty performing IADLS , limited insight into deficits, decreased initiation and engagement , and health management . Prior to admission, pt was I with ADLs and IADLs. Upon evaluation: the following deficits impact occupational performance: weakness, decreased strength, decreased balance, decreased tolerance, impaired problem solving, and decreased safety awareness. Pt to benefit from continued skilled OT tx while in the hospital to address deficits as defined above and maximize level of functional independence w ADL's and functional mobility. Occupational Performance areas to address include: grooming, bathing/shower, toilet hygiene, dressing, functional mobility, community mobility, and clothing management. From OT standpoint, recommendation at time of d/c would with moderate intensity OT resources.     Rehab Resource Intensity Level, OT: II  (Moderate Resource Intensity)     Arianna Elaine MS, OTR/L

## 2024-01-06 LAB
ALBUMIN SERPL BCP-MCNC: 3.5 G/DL (ref 3.5–5)
ALP SERPL-CCNC: 54 U/L (ref 34–104)
ALT SERPL W P-5'-P-CCNC: 48 U/L (ref 7–52)
ANION GAP SERPL CALCULATED.3IONS-SCNC: 8 MMOL/L
AST SERPL W P-5'-P-CCNC: 19 U/L (ref 13–39)
BASOPHILS # BLD MANUAL: 0 THOUSAND/UL (ref 0–0.1)
BASOPHILS NFR MAR MANUAL: 0 % (ref 0–1)
BILIRUB SERPL-MCNC: 0.56 MG/DL (ref 0.2–1)
BUN SERPL-MCNC: 49 MG/DL (ref 5–25)
CALCIUM SERPL-MCNC: 8.7 MG/DL (ref 8.4–10.2)
CHLORIDE SERPL-SCNC: 99 MMOL/L (ref 96–108)
CO2 SERPL-SCNC: 26 MMOL/L (ref 21–32)
CREAT SERPL-MCNC: 1.23 MG/DL (ref 0.6–1.3)
EOSINOPHIL # BLD MANUAL: 0 THOUSAND/UL (ref 0–0.4)
EOSINOPHIL NFR BLD MANUAL: 0 % (ref 0–6)
ERYTHROCYTE [DISTWIDTH] IN BLOOD BY AUTOMATED COUNT: 14 % (ref 11.6–15.1)
GFR SERPL CREATININE-BSD FRML MDRD: 38 ML/MIN/1.73SQ M
GLUCOSE SERPL-MCNC: 132 MG/DL (ref 65–140)
GLUCOSE SERPL-MCNC: 185 MG/DL (ref 65–140)
GLUCOSE SERPL-MCNC: 186 MG/DL (ref 65–140)
GLUCOSE SERPL-MCNC: 186 MG/DL (ref 65–140)
GLUCOSE SERPL-MCNC: 193 MG/DL (ref 65–140)
HCT VFR BLD AUTO: 38.1 % (ref 34.8–46.1)
HGB BLD-MCNC: 12.3 G/DL (ref 11.5–15.4)
LYMPHOCYTES # BLD AUTO: 1.93 THOUSAND/UL (ref 0.6–4.47)
LYMPHOCYTES # BLD AUTO: 11 % (ref 14–44)
MAGNESIUM SERPL-MCNC: 2.6 MG/DL (ref 1.9–2.7)
MCH RBC QN AUTO: 30.3 PG (ref 26.8–34.3)
MCHC RBC AUTO-ENTMCNC: 32.3 G/DL (ref 31.4–37.4)
MCV RBC AUTO: 94 FL (ref 82–98)
METAMYELOCYTES NFR BLD MANUAL: 2 % (ref 0–1)
MONOCYTES # BLD AUTO: 0.53 THOUSAND/UL (ref 0–1.22)
MONOCYTES NFR BLD: 3 % (ref 4–12)
NEUTROPHILS # BLD MANUAL: 14.75 THOUSAND/UL (ref 1.85–7.62)
NEUTS SEG NFR BLD AUTO: 84 % (ref 43–75)
PHOSPHATE SERPL-MCNC: 3.2 MG/DL (ref 2.3–4.1)
PLATELET # BLD AUTO: 276 THOUSANDS/UL (ref 149–390)
PLATELET BLD QL SMEAR: ADEQUATE
PMV BLD AUTO: 9.7 FL (ref 8.9–12.7)
POTASSIUM SERPL-SCNC: 4.8 MMOL/L (ref 3.5–5.3)
PROT SERPL-MCNC: 6.4 G/DL (ref 6.4–8.4)
RBC # BLD AUTO: 4.06 MILLION/UL (ref 3.81–5.12)
RBC MORPH BLD: NORMAL
SODIUM SERPL-SCNC: 133 MMOL/L (ref 135–147)
WBC # BLD AUTO: 17.56 THOUSAND/UL (ref 4.31–10.16)

## 2024-01-06 PROCEDURE — 84100 ASSAY OF PHOSPHORUS: CPT | Performed by: NURSE PRACTITIONER

## 2024-01-06 PROCEDURE — 94640 AIRWAY INHALATION TREATMENT: CPT

## 2024-01-06 PROCEDURE — 94668 MNPJ CHEST WALL SBSQ: CPT

## 2024-01-06 PROCEDURE — 99233 SBSQ HOSP IP/OBS HIGH 50: CPT | Performed by: NURSE PRACTITIONER

## 2024-01-06 PROCEDURE — 92526 ORAL FUNCTION THERAPY: CPT

## 2024-01-06 PROCEDURE — 80053 COMPREHEN METABOLIC PANEL: CPT | Performed by: NURSE PRACTITIONER

## 2024-01-06 PROCEDURE — C9113 INJ PANTOPRAZOLE SODIUM, VIA: HCPCS | Performed by: NURSE PRACTITIONER

## 2024-01-06 PROCEDURE — 83735 ASSAY OF MAGNESIUM: CPT | Performed by: NURSE PRACTITIONER

## 2024-01-06 PROCEDURE — 94669 MECHANICAL CHEST WALL OSCILL: CPT

## 2024-01-06 PROCEDURE — 94760 N-INVAS EAR/PLS OXIMETRY 1: CPT

## 2024-01-06 PROCEDURE — 85027 COMPLETE CBC AUTOMATED: CPT | Performed by: NURSE PRACTITIONER

## 2024-01-06 PROCEDURE — 82948 REAGENT STRIP/BLOOD GLUCOSE: CPT

## 2024-01-06 PROCEDURE — 85007 BL SMEAR W/DIFF WBC COUNT: CPT | Performed by: NURSE PRACTITIONER

## 2024-01-06 RX ORDER — GUAIFENESIN 600 MG/1
600 TABLET, EXTENDED RELEASE ORAL EVERY 12 HOURS SCHEDULED
Status: DISCONTINUED | OUTPATIENT
Start: 2024-01-06 | End: 2024-01-08 | Stop reason: HOSPADM

## 2024-01-06 RX ADMIN — CHLORHEXIDINE GLUCONATE, 0.12% ORAL RINSE 15 ML: 1.2 SOLUTION DENTAL at 08:05

## 2024-01-06 RX ADMIN — CEFEPIME HYDROCHLORIDE 2000 MG: 2 INJECTION, SOLUTION INTRAVENOUS at 21:27

## 2024-01-06 RX ADMIN — HEPARIN SODIUM 5000 UNITS: 5000 INJECTION INTRAVENOUS; SUBCUTANEOUS at 14:34

## 2024-01-06 RX ADMIN — HEPARIN SODIUM 5000 UNITS: 5000 INJECTION INTRAVENOUS; SUBCUTANEOUS at 21:28

## 2024-01-06 RX ADMIN — GUAIFENESIN 600 MG: 600 TABLET ORAL at 14:34

## 2024-01-06 RX ADMIN — LEVALBUTEROL HYDROCHLORIDE 1.25 MG: 1.25 SOLUTION RESPIRATORY (INHALATION) at 07:48

## 2024-01-06 RX ADMIN — DOCUSATE SODIUM AND SENNOSIDES 2 TABLET: 8.6; 5 TABLET, FILM COATED ORAL at 21:31

## 2024-01-06 RX ADMIN — METHYLPREDNISOLONE SODIUM SUCCINATE 60 MG: 125 INJECTION, POWDER, FOR SOLUTION INTRAMUSCULAR; INTRAVENOUS at 21:31

## 2024-01-06 RX ADMIN — INSULIN LISPRO 1 UNITS: 100 INJECTION, SOLUTION INTRAVENOUS; SUBCUTANEOUS at 16:40

## 2024-01-06 RX ADMIN — GUAIFENESIN 600 MG: 600 TABLET ORAL at 21:28

## 2024-01-06 RX ADMIN — HEPARIN SODIUM 5000 UNITS: 5000 INJECTION INTRAVENOUS; SUBCUTANEOUS at 05:05

## 2024-01-06 RX ADMIN — INSULIN LISPRO 1 UNITS: 100 INJECTION, SOLUTION INTRAVENOUS; SUBCUTANEOUS at 11:49

## 2024-01-06 RX ADMIN — LEVALBUTEROL HYDROCHLORIDE 1.25 MG: 1.25 SOLUTION RESPIRATORY (INHALATION) at 17:53

## 2024-01-06 RX ADMIN — PANTOPRAZOLE SODIUM 40 MG: 40 INJECTION, POWDER, FOR SOLUTION INTRAVENOUS at 08:05

## 2024-01-06 RX ADMIN — CEFEPIME HYDROCHLORIDE 2000 MG: 2 INJECTION, SOLUTION INTRAVENOUS at 09:30

## 2024-01-06 RX ADMIN — INSULIN LISPRO 2 UNITS: 100 INJECTION, SOLUTION INTRAVENOUS; SUBCUTANEOUS at 21:30

## 2024-01-06 RX ADMIN — LEVALBUTEROL HYDROCHLORIDE 1.25 MG: 1.25 SOLUTION RESPIRATORY (INHALATION) at 13:31

## 2024-01-06 RX ADMIN — METHYLPREDNISOLONE SODIUM SUCCINATE 60 MG: 125 INJECTION, POWDER, FOR SOLUTION INTRAMUSCULAR; INTRAVENOUS at 08:05

## 2024-01-06 NOTE — CASE MANAGEMENT
Case Management Discharge Planning Note    Patient name Dionne Zuluaga  Location /-01 MRN [id number]  : 1931 Date 2024       Current Admission Date: 2024  Current Admission Diagnosis:Acute respiratory failure with hypoxia and hypercapnia (HCC)   Patient Active Problem List    Diagnosis Date Noted    Dysphagia 2024    Acute respiratory failure with hypoxia and hypercapnia (HCC) 2024    Hyponatremia 2024    Severe sepsis (HCC) 2024    Elevated d-dimer 2024    Intrinsic eczema 2016    CKD stage G3b/A1, GFR 30-44 and albumin creatinine ratio <30 mg/g (HCC) 2013    Type 2 diabetes mellitus with stage 3 chronic kidney disease, without long-term current use of insulin (HCC) 10/25/2012    Tobacco use disorder 2012    Depression, major, in remission ([facility]) 2012    Hematuria 2012    Hypertensive emergency 2012    Mixed hyperlipidemia 10/31/2011      LOS (days): 5  Geometric Mean LOS (GMLOS) (days): 5.1  Days to GMLOS:0.3     OBJECTIVE:  Risk of Unplanned Readmission Score: 14         Current admission status: Inpatient   Preferred Pharmacy:   [id number] - [place], [place] - [address] [address]  Phone: [phone] Fax: [phone]    [id number] - [place], [place] - [address]  [address] [address]  Phone: [phone] Fax: [phone]    Primary Care Provider: Megan Barros DO    Primary Insurance: MEDICARE  Secondary Insurance: [facility]    DISCHARGE DETAILS:    Discharge planning discussed with:: Patient, daughters        Additional Comments: CM met with pt and daughters at bedside to discuss discharge planning. STR recommended. Pt and daughters would like to see how pt progressess with therapy before agreing to STR. Pt and family prefer to go home with [facility]. Family states someone may be able to stay with pt for the first  week after discharge. CM to follow.

## 2024-01-06 NOTE — SPEECH THERAPY NOTE
"Speech Language/Pathology    Speech/Language Pathology Progress Note    Patient Name: Dionne Zuluaga  Today's Date: 1/6/2024     Problem List  Principal Problem:    Acute respiratory failure with hypoxia and hypercapnia (HCC)  Active Problems:    CKD stage G3b/A1, GFR 30-44 and albumin creatinine ratio <30 mg/g (HCC)    Depression, major, in remission (HCC)    Hypertensive emergency    Mixed hyperlipidemia    Tobacco use disorder    Type 2 diabetes mellitus with stage 3 chronic kidney disease, without long-term current use of insulin (HCC)    Hyponatremia    Severe sepsis (HCC)    Elevated d-dimer    Dysphagia       Past Medical History  Past Medical History:   Diagnosis Date    Hyperlipidemia     Hypertension         Past Surgical History  Past Surgical History:   Procedure Laterality Date    HYSTERECTOMY  1974         Subjective:  Pt was alert and positioned fully upright. Family at bedside. \"I need my scratch offs to win\"  Objective:  Pt was seen for f/u dysphagia therapy. New to this ST. Currently on dysphagia 2 mech soft and thin liquids. On room air. Upon ST arrival pt had completed breakfast tray (banana), agreeable to additional trials of soft solids and thin liquids via straw with single/successive sips. Mastication, bolus control,formation, and transfer were WNL. Swallows were prompt. No overt s/s of aspiration. Min verbal cues to alternate liquids with solids.     Assessment:  Pt appears to be tolerating current diet level demonstrating functional oral and pharyngeal stage of swallow. . No overt s/s of aspiration    Plan/Recommendations:  Recommend upgrade to Regular and thin liquids. Ensure good oral care. ST f/u 1-2 times as indicated.      "

## 2024-01-06 NOTE — PLAN OF CARE
Problem: PAIN - ADULT  Goal: Verbalizes/displays adequate comfort level or baseline comfort level  Description: Interventions:  - Encourage patient to monitor pain and request assistance  - Assess pain using appropriate pain scale  - Administer analgesics based on type and severity of pain and evaluate response  - Implement non-pharmacological measures as appropriate and evaluate response  - Consider cultural and social influences on pain and pain management  - Notify physician/advanced practitioner if interventions unsuccessful or patient reports new pain  Outcome: Progressing     Problem: INFECTION - ADULT  Goal: Absence or prevention of progression during hospitalization  Description: INTERVENTIONS:  - Assess and monitor for signs and symptoms of infection  - Monitor lab/diagnostic results  - Monitor all insertion sites, i.e. indwelling lines, tubes, and drains  - Monitor endotracheal if appropriate and nasal secretions for changes in amount and color  - [place] appropriate cooling/warming therapies per order  - Administer medications as ordered  - Instruct and encourage patient and family to use good hand hygiene technique  - Identify and instruct in appropriate isolation precautions for identified infection/condition  Outcome: Progressing     Problem: SAFETY ADULT  Goal: Patient will remain free of falls  Description: INTERVENTIONS:  - Educate patient/family on patient safety including physical limitations  - Instruct patient to call for assistance with activity   - Consult OT/PT to assist with strengthening/mobility   - Keep Call bell within reach  - Keep bed low and locked with side rails adjusted as appropriate  - Keep care items and personal belongings within reach  - Initiate and maintain comfort rounds  - Make Fall Risk Sign visible to staff  - Offer Toileting every 2 Hours, in advance of need  - Initiate/Maintain bed alarm  - Obtain necessary fall risk management equipment:   - Apply yellow socks and  bracelet for high fall risk patients  - Consider moving patient to room near nurses station  Outcome: Progressing  Goal: Maintain or return to baseline ADL function  Description: INTERVENTIONS:  -  Assess patient's ability to carry out ADLs; assess patient's baseline for ADL function and identify physical deficits which impact ability to perform ADLs (bathing, care of mouth/teeth, toileting, grooming, dressing, etc.)  - Assess/evaluate cause of self-care deficits   - Assess range of motion  - Assess patient's mobility; develop plan if impaired  - Assess patient's need for assistive devices and provide as appropriate  - Encourage maximum independence but intervene and supervise when necessary  - Involve family in performance of ADLs  - Assess for home care needs following discharge   - Consider OT consult to assist with ADL evaluation and planning for discharge  - Provide patient education as appropriate  Outcome: Progressing  Goal: Maintains/Returns to pre admission functional level  Description: INTERVENTIONS:  - Perform AM-PAC 6 Click Basic Mobility/ Daily Activity assessment daily.  - Set and communicate daily mobility goal to care team and patient/family/caregiver.   - Collaborate with rehabilitation services on mobility goals if consulted  - Perform Range of Motion 3 times a day.  - Reposition patient every 2 hours.  - Dangle patient 3 times a day  - Stand patient 3 times a day  - Ambulate patient 3 times a day  - Out of bed for meals   - Out of bed for toileting  - Record patient progress and toleration of activity level   Outcome: Progressing     Problem: DISCHARGE PLANNING  Goal: Discharge to home or other facility with appropriate resources  Description: INTERVENTIONS:  - Identify barriers to discharge w/patient and caregiver  - Arrange for needed discharge resources and transportation as appropriate  - Identify discharge learning needs (meds, wound care, etc.)  - Arrange for interpretive services to assist  at discharge as needed  - Refer to Case Management Department for coordinating discharge planning if the patient needs post-hospital services based on physician/advanced practitioner order or complex needs related to functional status, cognitive ability, or social support system  Outcome: Progressing     Problem: Knowledge Deficit  Goal: Patient/family/caregiver demonstrates understanding of disease process, treatment plan, medications, and discharge instructions  Description: Complete learning assessment and assess knowledge base.  Interventions:  - Provide teaching at level of understanding  - Provide teaching via preferred learning methods  Outcome: Progressing     Problem: Nutrition/Hydration-ADULT  Goal: Nutrient/Hydration intake appropriate for improving, restoring or maintaining nutritional needs  Description: Monitor and assess patient's nutrition/hydration status for malnutrition. Collaborate with interdisciplinary team and initiate plan and interventions as ordered.  Monitor patient's weight and dietary intake as ordered or per policy. Utilize nutrition screening tool and intervene as necessary. Determine patient's food preferences and provide high-protein, high-caloric foods as appropriate.     INTERVENTIONS:  - Monitor oral intake, urinary output, labs, and treatment plans  - Assess nutrition and hydration status and recommend course of action  - Evaluate amount of meals eaten  - Assist patient with eating if necessary   - Allow adequate time for meals  - Recommend/ encourage appropriate diets, oral nutritional supplements, and vitamin/mineral supplements  - Order, calculate, and assess calorie counts as needed  - Recommend, monitor, and adjust tube feedings and TPN/PPN based on assessed needs  - Assess need for intravenous fluids  - Provide specific nutrition/hydration education as appropriate  - Include patient/family/caregiver in decisions related to nutrition  Outcome: Progressing     Problem:  Prexisting or High Potential for Compromised Skin Integrity  Goal: Skin integrity is maintained or improved  Description: INTERVENTIONS:  - Identify patients at risk for skin breakdown  - Assess and monitor skin integrity  - Assess and monitor nutrition and hydration status  - Monitor labs   - Assess for incontinence   - Turn and reposition patient  - Assist with mobility/ambulation  - Relieve pressure over bony prominences  - Avoid friction and shearing  - Provide appropriate hygiene as needed including keeping skin clean and dry  - Evaluate need for skin moisturizer/barrier cream  - Collaborate with interdisciplinary team   - Patient/family teaching  - Consider wound care consult   Outcome: Progressing     Problem: RESPIRATORY - ADULT  Goal: Achieves optimal ventilation and oxygenation  Description: INTERVENTIONS:  - Assess for changes in respiratory status  - Assess for changes in mentation and behavior  - Position to facilitate oxygenation and minimize respiratory effort  - Oxygen administered by appropriate delivery if ordered  - Initiate smoking cessation education as indicated  - Encourage broncho-pulmonary hygiene including cough, deep breathe, Incentive Spirometry  - Assess the need for suctioning and aspirate as needed  - Assess and instruct to report SOB or any respiratory difficulty  - Respiratory Therapy support as indicated  Outcome: Progressing

## 2024-01-06 NOTE — ASSESSMENT & PLAN NOTE
Sepsis parameters met with tachycardia, tachypnea, and leukocytosis   Blood cultures 1/1/2024: NGTD  Flu/RSV/COVID: Negative   Procal: 0.29  Imaging as above  Continue cefepime IV, day 6 of antibiotics

## 2024-01-06 NOTE — PLAN OF CARE
Problem: PAIN - ADULT  Goal: Verbalizes/displays adequate comfort level or baseline comfort level  Description: Interventions:  - Encourage patient to monitor pain and request assistance  - Assess pain using appropriate pain scale  - Administer analgesics based on type and severity of pain and evaluate response  - Implement non-pharmacological measures as appropriate and evaluate response  - Consider cultural and social influences on pain and pain management  - Notify physician/advanced practitioner if interventions unsuccessful or patient reports new pain  Outcome: Progressing     Problem: INFECTION - ADULT  Goal: Absence or prevention of progression during hospitalization  Description: INTERVENTIONS:  - Assess and monitor for signs and symptoms of infection  - Monitor lab/diagnostic results  - Monitor all insertion sites, i.e. indwelling lines, tubes, and drains  - Monitor endotracheal if appropriate and nasal secretions for changes in amount and color  - [place] appropriate cooling/warming therapies per order  - Administer medications as ordered  - Instruct and encourage patient and family to use good hand hygiene technique  - Identify and instruct in appropriate isolation precautions for identified infection/condition  Outcome: Progressing     Problem: Knowledge Deficit  Goal: Patient/family/caregiver demonstrates understanding of disease process, treatment plan, medications, and discharge instructions  Description: Complete learning assessment and assess knowledge base.  Interventions:  - Provide teaching at level of understanding  - Provide teaching via preferred learning methods  Outcome: Progressing     Problem: Nutrition/Hydration-ADULT  Goal: Nutrient/Hydration intake appropriate for improving, restoring or maintaining nutritional needs  Description: Monitor and assess patient's nutrition/hydration status for malnutrition. Collaborate with interdisciplinary team and initiate plan and interventions as  ordered.  Monitor patient's weight and dietary intake as ordered or per policy. Utilize nutrition screening tool and intervene as necessary. Determine patient's food preferences and provide high-protein, high-caloric foods as appropriate.     INTERVENTIONS:  - Monitor oral intake, urinary output, labs, and treatment plans  - Assess nutrition and hydration status and recommend course of action  - Evaluate amount of meals eaten  - Assist patient with eating if necessary   - Allow adequate time for meals  - Recommend/ encourage appropriate diets, oral nutritional supplements, and vitamin/mineral supplements  - Order, calculate, and assess calorie counts as needed  - Recommend, monitor, and adjust tube feedings and TPN/PPN based on assessed needs  - Assess need for intravenous fluids  - Provide specific nutrition/hydration education as appropriate  - Include patient/family/caregiver in decisions related to nutrition  Outcome: Progressing     Problem: RESPIRATORY - ADULT  Goal: Achieves optimal ventilation and oxygenation  Description: INTERVENTIONS:  - Assess for changes in respiratory status  - Assess for changes in mentation and behavior  - Position to facilitate oxygenation and minimize respiratory effort  - Oxygen administered by appropriate delivery if ordered  - Initiate smoking cessation education as indicated  - Encourage broncho-pulmonary hygiene including cough, deep breathe, Incentive Spirometry  - Assess the need for suctioning and aspirate as needed  - Assess and instruct to report SOB or any respiratory difficulty  - Respiratory Therapy support as indicated  Outcome: Progressing      Term [place] Vaginal Delivery (>/= 37 weeks)

## 2024-01-06 NOTE — ASSESSMENT & PLAN NOTE
Sodium 133.  Resolved  Sodium trended down to 120 on 1/2/2024 suspected to be multifactorial from HCTZ use in combination with SIADH due to PNA  HCTZ on hold  Daily metabolic panel and trend sodium

## 2024-01-06 NOTE — PROGRESS NOTES
[facility]  Progress Note  Name: Dionne Zuluaga I  MRN: [id number]  Unit/Bed#: -01 I Date of Admission: 1/1/2024   Date of Service: 1/6/2024 I Hospital Day: 5    Assessment/Plan     * Acute respiratory failure with hypoxia and hypercapnia (HCC)  Assessment & Plan  Currently saturating 95 to 97% on room air.  Required up to 8 L mid flow during acute illness   CT chest 1/1/2024: Attenuation of multiple small airways in the right lower lobe which could be related to infectious or inflammatory bronchitis, aspiration, and/or mucous plugging   No formal diagnosis of COPD  Chest x-ray 1/2/2024: Interstitial edema with development of retrocardiac opacity.  Chest x-ray 1/3/2024: Question mild pulmonary venous congestion with possible small left pleural effusion and left retrocardiac atelectasis  Continue Solumedrol 60 mg IV BID.  De-escalate steroids as patient improves  Continue cefepime IV, D6  Add Mucinex, airway clearance protocol  Status post 5 days azithromycin 500 mg IV and 4 days of vancomycin  Pulmonology recommendations appreciated    Severe sepsis (HCC)  Assessment & Plan  Sepsis parameters met with tachycardia, tachypnea, and leukocytosis   Blood cultures 1/1/2024: NGTD  Flu/RSV/COVID: Negative   Procal: 0.29  Imaging as above  Continue cefepime IV, day 6 of antibiotics      Dysphagia  Assessment & Plan  Speech evaluation appreciated.  Regular diet with thin liquids  Continue to monitor    Elevated d-dimer  Assessment & Plan  D-dimer was 2.98  CTA was limited by motion artifact but no central PE was identified     Hyponatremia  Assessment & Plan  Sodium 133.  Resolved  Sodium trended down to 120 on 1/2/2024 suspected to be multifactorial from HCTZ use in combination with SIADH due to PNA  HCTZ on hold  Daily metabolic panel and trend sodium    Type 2 diabetes mellitus with stage 3 chronic kidney disease, without long-term current use of insulin (HCC)  Assessment & Plan  Lab Results    Component Value Date    HGBA1C 6.8 (H) 01/01/2024       Recent Labs     01/05/24  1739 01/05/24  2032 01/06/24  0722 01/06/24  1115   POCGLU 198* 139 132 186*         Blood Sugar Average: Last 72 hrs:  (P) 179.2156047055139509    Continue Accu-Cheks, corrective sliding scale insulin, and hypoglycemic protocol  Carb controlled diet    Tobacco use disorder  Assessment & Plan  Counseled patient on importance of smoking cessation  Offered nicotine replacement which she declined    Hypertensive emergency  Assessment & Plan  BP reviewed and acceptable   Home lisinopril 20 mg daily and home hydrochlorothiazide on hold at this time  Continue to monitor blood pressure trends and adjust medications as indicated           VTE Pharmacologic Prophylaxis: VTE Score: 8 High Risk (Score >/= 5) - Pharmacological DVT Prophylaxis Ordered: heparin. Sequential Compression Devices Ordered.    Mobility:   Basic Mobility Inpatient Raw Score: 16  JH-HLM Goal: 5: Stand one or more mins  JH-HLM Achieved: 2: Bed activities/Dependent transfer  HLM Goal NOT achieved. Continue with multidisciplinary rounding and encourage appropriate mobility to improve upon HLM goals.    Patient Centered Rounds: I performed bedside rounds with nursing staff today.     Education and Discussions with Family / Patient: Updated  (daughter) at bedside.    Total Time Spent on Date of Encounter in care of patient: 60 mins. This time was spent on one or more of the following: performing physical exam; counseling and coordination of care; obtaining or reviewing history; documenting in the medical record; reviewing/ordering tests, medications or procedures; communicating with other healthcare professionals and discussing with patient's family/caregivers.    Current Length of Stay: 5 day(s)  Current Patient Status: Inpatient   Certification Statement: The patient will continue to require additional inpatient hospital stay due to acute respiratory failure,  sepsis.  Discharge Plan: Anticipate discharge in 48 hrs to discharge location to be determined pending rehab evaluations.    Code Status: Level 1 - Full Code    Subjective:   Patient seen and examined.  She said she feels better she just still has a cough and trouble bringing up the mucus.  She ate about half of her meal.  Denies shortness of breath, chest pain.      Objective:     Vitals:   Temp (24hrs), Av.3 °F (36.8 °C), Min:98.1 °F (36.7 °C), Max:98.5 °F (36.9 °C)    Temp:  [98.1 °F (36.7 °C)-98.5 °F (36.9 °C)] 98.1 °F (36.7 °C)  HR:  [55-76] 61  Resp:  [21-35] 35  BP: (114-130)/(55-60) 130/60  SpO2:  [92 %-97 %] 95 %  Body mass index is 36.32 kg/m².     Input and Output Summary (last 24 hours):     Intake/Output Summary (Last 24 hours) at 2024 1424  Last data filed at 2024 1300  Gross per 24 hour   Intake 770 ml   Output 1700 ml   Net -930 ml       Physical Exam:   Physical Exam  Vitals and nursing note reviewed.   Constitutional:       General: She is not in acute distress.  HENT:      Head: Normocephalic and atraumatic.      Nose: Nose normal.      Mouth/Throat:      Mouth: Mucous membranes are moist.      Pharynx: Oropharynx is clear.   Eyes:      Pupils: Pupils are equal, round, and reactive to light.   Cardiovascular:      Rate and Rhythm: Normal rate and regular rhythm.      Pulses: Normal pulses.      Heart sounds: Normal heart sounds.   Pulmonary:      Effort: Pulmonary effort is normal. No respiratory distress.      Breath sounds: Wheezing present.   Abdominal:      General: Bowel sounds are normal.      Palpations: Abdomen is soft.      Tenderness: There is no abdominal tenderness.   Musculoskeletal:      Cervical back: Neck supple.      Right lower leg: No edema.      Left lower leg: No edema.   Skin:     General: Skin is warm and dry.      Capillary Refill: Capillary refill takes less than 2 seconds.   Neurological:      General: No focal deficit present.      Mental Status: She is alert  and oriented to person, place, and time. Mental status is at baseline.          Additional Data:     Labs:  Results from last 7 days   Lab Units 01/06/24  0444 01/05/24  0427 01/04/24  0353   WBC Thousand/uL 17.56* 19.20* 19.94*   HEMOGLOBIN g/dL 12.3 12.0 10.7*   HEMATOCRIT % 38.1 36.7 33.1*   PLATELETS Thousands/uL 276 265 263   BANDS PCT %  --  1  --    NEUTROS PCT %  --   --  87*   LYMPHS PCT %  --   --  5*   LYMPHO PCT % 11* 11*  --    MONOS PCT %  --   --  6   MONO PCT % 3* 6  --    EOS PCT % 0 0 0     Results from last 7 days   Lab Units 01/06/24  0444   SODIUM mmol/L 133*   POTASSIUM mmol/L 4.8   CHLORIDE mmol/L 99   CO2 mmol/L 26   BUN mg/dL 49*   CREATININE mg/dL 1.23   ANION GAP mmol/L 8   CALCIUM mg/dL 8.7   ALBUMIN g/dL 3.5   TOTAL BILIRUBIN mg/dL 0.56   ALK PHOS U/L 54   ALT U/L 48   AST U/L 19   GLUCOSE RANDOM mg/dL 186*     Results from last 7 days   Lab Units 01/01/24  1643   INR  1.06     Results from last 7 days   Lab Units 01/06/24  1115 01/06/24  0722 01/05/24  2032 01/05/24  1739 01/05/24  1202 01/05/24  0529 01/04/24  2336 01/04/24  1748 01/04/24  1127 01/04/24  0605 01/03/24  2350 01/03/24  1729   POC GLUCOSE mg/dl 186* 132 139 198* 174* 174* 162* 141* 176* 205* 217* 223*     Results from last 7 days   Lab Units 01/01/24  1643   HEMOGLOBIN A1C % 6.8*     Results from last 7 days   Lab Units 01/04/24  0354 01/03/24  0441 01/02/24  1131 01/02/24  0238 01/01/24  1719   LACTIC ACID mmol/L  --   --  0.9  --  1.8   PROCALCITONIN ng/ml 0.29* 0.31*  --  0.22 0.11       Lines/Drains:  Invasive Devices       Peripheral Intravenous Line  Duration             Peripheral IV 01/02/24 Left Antecubital 4 days    Peripheral IV 01/03/24 Distal;Left;Ventral (anterior) Forearm 2 days              Drain  Duration             External Urinary Catheter <1 day                          Imaging: Reviewed radiology reports from this admission including: chest xray and chest CT scan    Recent Cultures (last 7 days):    Results from last 7 days   Lab Units 01/02/24  1309 01/02/24  0016 01/01/24  1719   BLOOD CULTURE   --   --  No Growth After 4 Days.  No Growth After 4 Days.   SPUTUM CULTURE  3+ Growth of  --   --    GRAM STAIN RESULT  2+ Epithelial cells per low power field*  1+ Polys*  3+ Gram positive cocci in pairs*  2+ Gram negative rods*  1+ Gram positive cocci in clusters*  1+ Yeast*  --   --    LEGIONELLA URINARY ANTIGEN   --  Negative  --        Last 24 Hours Medication List:   Current Facility-Administered Medications   Medication Dose Route Frequency Provider Last Rate    acetaminophen  650 mg Oral Q6H PRN Mónica L TARUN Tran      benzonatate  100 mg Oral TID PRN Mónica TARUN Lobato      cefepime  2,000 mg Intravenous Q12H MónicaTARUN Campbell Stopped (01/06/24 1001)    chlorhexidine  15 mL Mouth/Throat Q12H [facility] MónicaTARUN Campbell      dextromethorphan-guaiFENesin  10 mL Oral Q4H PRN Mónica TARUN Lobato      guaiFENesin  600 mg Oral Q12H RUTH TARUN Vang      heparin (porcine)  5,000 Units Subcutaneous Q8H [facility] MónicaTARUN Mckoy      insulin lispro  1-6 Units Subcutaneous TID  Lang Barry PA-C      insulin lispro  1-6 Units Subcutaneous  Lang Barry PA-C      levalbuterol  1.25 mg Nebulization TID TARUN Vasquez      methylPREDNISolone sodium succinate  60 mg Intravenous Q12H [facility] MónicaTARUN Mckoy      ondansetron  4 mg Intravenous Q6H PRN MónicaTARUN Campbell      pantoprazole  40 mg Intravenous Q24H [facility] MónicaTARUN Campbell      senna-docusate sodium  2 tablet Oral HS TARUN Vasquez          Today, Patient Was Seen By: TARUN Vang    **Please Note: This note may have been constructed using a voice recognition system.**

## 2024-01-06 NOTE — ASSESSMENT & PLAN NOTE
Currently saturating 95 to 97% on room air.  Required up to 8 L mid flow during acute illness   CT chest 1/1/2024: Attenuation of multiple small airways in the right lower lobe which could be related to infectious or inflammatory bronchitis, aspiration, and/or mucous plugging   No formal diagnosis of COPD  Chest x-ray 1/2/2024: Interstitial edema with development of retrocardiac opacity.  Chest x-ray 1/3/2024: Question mild pulmonary venous congestion with possible small left pleural effusion and left retrocardiac atelectasis  Continue Solumedrol 60 mg IV BID.  De-escalate steroids as patient improves  Continue cefepime IV, D6  Add Mucinex, airway clearance protocol  Status post 5 days azithromycin 500 mg IV and 4 days of vancomycin  Pulmonology recommendations appreciated

## 2024-01-06 NOTE — ASSESSMENT & PLAN NOTE
Lab Results   Component Value Date    HGBA1C 6.8 (H) 01/01/2024       Recent Labs     01/05/24  1739 01/05/24 2032 01/06/24 0722 01/06/24  1115   POCGLU 198* 139 132 186*         Blood Sugar Average: Last 72 hrs:  (P) 179.5788501145549574    Continue Accu-Cheks, corrective sliding scale insulin, and hypoglycemic protocol  Carb controlled diet

## 2024-01-06 NOTE — ASSESSMENT & PLAN NOTE
BP reviewed and acceptable   Home lisinopril 20 mg daily and home hydrochlorothiazide on hold at this time  Continue to monitor blood pressure trends and adjust medications as indicated

## 2024-01-07 LAB
ANION GAP SERPL CALCULATED.3IONS-SCNC: 8 MMOL/L
BACTERIA BLD CULT: NORMAL
BACTERIA BLD CULT: NORMAL
BUN SERPL-MCNC: 50 MG/DL (ref 5–25)
CALCIUM SERPL-MCNC: 9.1 MG/DL (ref 8.4–10.2)
CHLORIDE SERPL-SCNC: 96 MMOL/L (ref 96–108)
CO2 SERPL-SCNC: 28 MMOL/L (ref 21–32)
CREAT SERPL-MCNC: 1.29 MG/DL (ref 0.6–1.3)
ERYTHROCYTE [DISTWIDTH] IN BLOOD BY AUTOMATED COUNT: 14 % (ref 11.6–15.1)
GFR SERPL CREATININE-BSD FRML MDRD: 36 ML/MIN/1.73SQ M
GLUCOSE SERPL-MCNC: 165 MG/DL (ref 65–140)
GLUCOSE SERPL-MCNC: 175 MG/DL (ref 65–140)
GLUCOSE SERPL-MCNC: 179 MG/DL (ref 65–140)
GLUCOSE SERPL-MCNC: 197 MG/DL (ref 65–140)
GLUCOSE SERPL-MCNC: 204 MG/DL (ref 65–140)
HCT VFR BLD AUTO: 39.8 % (ref 34.8–46.1)
HGB BLD-MCNC: 13.1 G/DL (ref 11.5–15.4)
MCH RBC QN AUTO: 30.5 PG (ref 26.8–34.3)
MCHC RBC AUTO-ENTMCNC: 32.9 G/DL (ref 31.4–37.4)
MCV RBC AUTO: 93 FL (ref 82–98)
PLATELET # BLD AUTO: 312 THOUSANDS/UL (ref 149–390)
PMV BLD AUTO: 9.7 FL (ref 8.9–12.7)
POTASSIUM SERPL-SCNC: 4.7 MMOL/L (ref 3.5–5.3)
PROCALCITONIN SERPL-MCNC: 0.14 NG/ML
RBC # BLD AUTO: 4.3 MILLION/UL (ref 3.81–5.12)
SODIUM SERPL-SCNC: 132 MMOL/L (ref 135–147)
WBC # BLD AUTO: 19.28 THOUSAND/UL (ref 4.31–10.16)

## 2024-01-07 PROCEDURE — 85025 COMPLETE CBC W/AUTO DIFF WBC: CPT | Performed by: NURSE PRACTITIONER

## 2024-01-07 PROCEDURE — 84145 PROCALCITONIN (PCT): CPT | Performed by: NURSE PRACTITIONER

## 2024-01-07 PROCEDURE — 94760 N-INVAS EAR/PLS OXIMETRY 1: CPT

## 2024-01-07 PROCEDURE — 94640 AIRWAY INHALATION TREATMENT: CPT

## 2024-01-07 PROCEDURE — 80048 BASIC METABOLIC PNL TOTAL CA: CPT | Performed by: NURSE PRACTITIONER

## 2024-01-07 PROCEDURE — 82948 REAGENT STRIP/BLOOD GLUCOSE: CPT

## 2024-01-07 PROCEDURE — 94668 MNPJ CHEST WALL SBSQ: CPT

## 2024-01-07 PROCEDURE — C9113 INJ PANTOPRAZOLE SODIUM, VIA: HCPCS | Performed by: NURSE PRACTITIONER

## 2024-01-07 PROCEDURE — 99233 SBSQ HOSP IP/OBS HIGH 50: CPT | Performed by: NURSE PRACTITIONER

## 2024-01-07 RX ORDER — HYDROCODONE POLISTIREX AND CHLORPHENIRAMINE POLISTIREX 10; 8 MG/5ML; MG/5ML
5 SUSPENSION, EXTENDED RELEASE ORAL EVERY 12 HOURS PRN
Status: DISCONTINUED | OUTPATIENT
Start: 2024-01-07 | End: 2024-01-08

## 2024-01-07 RX ADMIN — HEPARIN SODIUM 5000 UNITS: 5000 INJECTION INTRAVENOUS; SUBCUTANEOUS at 21:19

## 2024-01-07 RX ADMIN — GUAIFENESIN 600 MG: 600 TABLET ORAL at 09:22

## 2024-01-07 RX ADMIN — INSULIN LISPRO 2 UNITS: 100 INJECTION, SOLUTION INTRAVENOUS; SUBCUTANEOUS at 11:38

## 2024-01-07 RX ADMIN — DOCUSATE SODIUM AND SENNOSIDES 2 TABLET: 8.6; 5 TABLET, FILM COATED ORAL at 21:19

## 2024-01-07 RX ADMIN — CEFEPIME HYDROCHLORIDE 2000 MG: 2 INJECTION, SOLUTION INTRAVENOUS at 09:22

## 2024-01-07 RX ADMIN — CEFEPIME HYDROCHLORIDE 2000 MG: 2 INJECTION, SOLUTION INTRAVENOUS at 21:19

## 2024-01-07 RX ADMIN — METHYLPREDNISOLONE SODIUM SUCCINATE 60 MG: 125 INJECTION, POWDER, FOR SOLUTION INTRAMUSCULAR; INTRAVENOUS at 09:22

## 2024-01-07 RX ADMIN — PANTOPRAZOLE SODIUM 40 MG: 40 INJECTION, POWDER, FOR SOLUTION INTRAVENOUS at 09:22

## 2024-01-07 RX ADMIN — GUAIFENESIN 600 MG: 600 TABLET ORAL at 21:19

## 2024-01-07 RX ADMIN — INSULIN LISPRO 2 UNITS: 100 INJECTION, SOLUTION INTRAVENOUS; SUBCUTANEOUS at 22:09

## 2024-01-07 RX ADMIN — HEPARIN SODIUM 5000 UNITS: 5000 INJECTION INTRAVENOUS; SUBCUTANEOUS at 14:15

## 2024-01-07 RX ADMIN — HEPARIN SODIUM 5000 UNITS: 5000 INJECTION INTRAVENOUS; SUBCUTANEOUS at 04:45

## 2024-01-07 RX ADMIN — METHYLPREDNISOLONE SODIUM SUCCINATE 60 MG: 125 INJECTION, POWDER, FOR SOLUTION INTRAMUSCULAR; INTRAVENOUS at 22:07

## 2024-01-07 RX ADMIN — LEVALBUTEROL HYDROCHLORIDE 1.25 MG: 1.25 SOLUTION RESPIRATORY (INHALATION) at 18:01

## 2024-01-07 RX ADMIN — INSULIN LISPRO 1 UNITS: 100 INJECTION, SOLUTION INTRAVENOUS; SUBCUTANEOUS at 07:58

## 2024-01-07 RX ADMIN — INSULIN LISPRO 1 UNITS: 100 INJECTION, SOLUTION INTRAVENOUS; SUBCUTANEOUS at 16:51

## 2024-01-07 RX ADMIN — LEVALBUTEROL HYDROCHLORIDE 1.25 MG: 1.25 SOLUTION RESPIRATORY (INHALATION) at 06:18

## 2024-01-07 RX ADMIN — LEVALBUTEROL HYDROCHLORIDE 1.25 MG: 1.25 SOLUTION RESPIRATORY (INHALATION) at 14:06

## 2024-01-07 NOTE — ASSESSMENT & PLAN NOTE
Sodium today 132  Sodium trended down to 120 on 1/2/2024 suspected to be multifactorial from HCTZ use in combination with SIADH due to PNA  HCTZ on hold  Daily metabolic panel and trend sodium

## 2024-01-07 NOTE — ASSESSMENT & PLAN NOTE
Lab Results   Component Value Date    HGBA1C 6.8 (H) 01/01/2024       Recent Labs     01/06/24  1602 01/06/24  2100 01/07/24  0747 01/07/24  1048   POCGLU 185* 193* 175* 204*         Blood Sugar Average: Last 72 hrs:  (P) 174.4045294104342337    Continue Accu-Cheks, corrective sliding scale insulin, and hypoglycemic protocol  Carb controlled diet

## 2024-01-07 NOTE — ASSESSMENT & PLAN NOTE
Currently saturating 95 to 97% on room air.  Required up to 8 L mid flow during acute illness   CT chest 1/1/2024: Attenuation of multiple small airways in the right lower lobe which could be related to infectious or inflammatory bronchitis, aspiration, and/or mucous plugging   No formal diagnosis of COPD  Chest x-ray 1/2/2024: Interstitial edema with development of retrocardiac opacity.  Chest x-ray 1/3/2024: Question mild pulmonary venous congestion with possible small left pleural effusion and left retrocardiac atelectasis  Continue Solumedrol 60 mg IV BID, transition to oral prednisone taper tomorrow as patient continues to improve  Continue cefepime IV, D7  Add Mucinex, airway clearance protocol  Status post 5 days azithromycin 500 mg IV and 4 days of vancomycin  Pulmonology recommendations appreciated

## 2024-01-07 NOTE — PLAN OF CARE
Problem: PAIN - ADULT  Goal: Verbalizes/displays adequate comfort level or baseline comfort level  Description: Interventions:  - Encourage patient to monitor pain and request assistance  - Assess pain using appropriate pain scale  - Administer analgesics based on type and severity of pain and evaluate response  - Implement non-pharmacological measures as appropriate and evaluate response  - Consider cultural and social influences on pain and pain management  - Notify physician/advanced practitioner if interventions unsuccessful or patient reports new pain  Outcome: Progressing     Problem: Knowledge Deficit  Goal: Patient/family/caregiver demonstrates understanding of disease process, treatment plan, medications, and discharge instructions  Description: Complete learning assessment and assess knowledge base.  Interventions:  - Provide teaching at level of understanding  - Provide teaching via preferred learning methods  Outcome: Progressing     Problem: Nutrition/Hydration-ADULT  Goal: Nutrient/Hydration intake appropriate for improving, restoring or maintaining nutritional needs  Description: Monitor and assess patient's nutrition/hydration status for malnutrition. Collaborate with interdisciplinary team and initiate plan and interventions as ordered.  Monitor patient's weight and dietary intake as ordered or per policy. Utilize nutrition screening tool and intervene as necessary. Determine patient's food preferences and provide high-protein, high-caloric foods as appropriate.     INTERVENTIONS:  - Monitor oral intake, urinary output, labs, and treatment plans  - Assess nutrition and hydration status and recommend course of action  - Evaluate amount of meals eaten  - Assist patient with eating if necessary   - Allow adequate time for meals  - Recommend/ encourage appropriate diets, oral nutritional supplements, and vitamin/mineral supplements  - Order, calculate, and assess calorie counts as needed  - Recommend,  monitor, and adjust tube feedings and TPN/PPN based on assessed needs  - Assess need for intravenous fluids  - Provide specific nutrition/hydration education as appropriate  - Include patient/family/caregiver in decisions related to nutrition  Outcome: Progressing     Problem: RESPIRATORY - ADULT  Goal: Achieves optimal ventilation and oxygenation  Description: INTERVENTIONS:  - Assess for changes in respiratory status  - Assess for changes in mentation and behavior  - Position to facilitate oxygenation and minimize respiratory effort  - Oxygen administered by appropriate delivery if ordered  - Initiate smoking cessation education as indicated  - Encourage broncho-pulmonary hygiene including cough, deep breathe, Incentive Spirometry  - Assess the need for suctioning and aspirate as needed  - Assess and instruct to report SOB or any respiratory difficulty  - Respiratory Therapy support as indicated  Outcome: Progressing

## 2024-01-07 NOTE — ASSESSMENT & PLAN NOTE
Sepsis parameters met with tachycardia, tachypnea, and leukocytosis   Blood cultures 1/1/2024: NGTD  Flu/RSV/COVID: Negative   Procal: 0.29  Imaging as above  Continue cefepime IV, day 7 of antibiotics

## 2024-01-07 NOTE — PROGRESS NOTES
[facility]  Progress Note  Name: Dionne Zuluaga I  MRN: [id number]  Unit/Bed#: -01 I Date of Admission: 1/1/2024   Date of Service: 1/7/2024 I Hospital Day: 6    Assessment/Plan     * Acute respiratory failure with hypoxia and hypercapnia (HCC)  Assessment & Plan  Currently saturating 95 to 97% on room air.  Required up to 8 L mid flow during acute illness   CT chest 1/1/2024: Attenuation of multiple small airways in the right lower lobe which could be related to infectious or inflammatory bronchitis, aspiration, and/or mucous plugging   No formal diagnosis of COPD  Chest x-ray 1/2/2024: Interstitial edema with development of retrocardiac opacity.  Chest x-ray 1/3/2024: Question mild pulmonary venous congestion with possible small left pleural effusion and left retrocardiac atelectasis  Continue Solumedrol 60 mg IV BID, transition to oral prednisone taper tomorrow as patient continues to improve  Continue cefepime IV, D7  Add Mucinex, airway clearance protocol  Status post 5 days azithromycin 500 mg IV and 4 days of vancomycin  Pulmonology recommendations appreciated    Severe sepsis (HCC)  Assessment & Plan  Sepsis parameters met with tachycardia, tachypnea, and leukocytosis   Blood cultures 1/1/2024: NGTD  Flu/RSV/COVID: Negative   Procal: 0.29  Imaging as above  Continue cefepime IV, day 7 of antibiotics      Dysphagia  Assessment & Plan  Speech evaluation appreciated.  Regular diet with thin liquids  Continue to monitor    Elevated d-dimer  Assessment & Plan  D-dimer was 2.98  CTA was limited by motion artifact but no central PE was identified     Hyponatremia  Assessment & Plan  Sodium today 132  Sodium trended down to 120 on 1/2/2024 suspected to be multifactorial from HCTZ use in combination with SIADH due to PNA  HCTZ on hold  Daily metabolic panel and trend sodium    Type 2 diabetes mellitus with stage 3 chronic kidney disease, without long-term current use of insulin  ([facility]ly [facility])  Assessment & Plan  Lab Results   Component Value Date    HGBA1C 6.8 (H) 01/01/2024       Recent Labs     01/06/24  1602 01/06/24  2100 01/07/24  0747 01/07/24  1048   POCGLU 185* 193* 175* 204*         Blood Sugar Average: Last 72 hrs:  (P) [id number]    Continue Accu-Cheks, corrective sliding scale insulin, and hypoglycemic protocol  Carb controlled diet    Tobacco use disorder  Assessment & Plan  Counseled patient on importance of smoking cessation  Offered nicotine replacement which she declined    Hypertensive emergency  Assessment & Plan  BP reviewed and acceptable   Home lisinopril 20 mg daily and home hydrochlorothiazide on hold at this time  Continue to monitor blood pressure trends and adjust medications as indicated         VTE Pharmacologic Prophylaxis: VTE Score: 8 High Risk (Score >/= 5) - Pharmacological DVT Prophylaxis Ordered: heparin. Sequential Compression Devices Ordered.    Mobility:   Basic Mobility Inpatient Raw Score: 15  JH-HLM Goal: 4: Move to chair/commode  JH-HLM Achieved: 2: Bed activities/Dependent transfer  HLM Goal NOT achieved. Continue with multidisciplinary rounding and encourage appropriate mobility to improve upon HLM goals.    Patient Centered Rounds: I performed bedside rounds with nursing staff today.     Education and Discussions with Family / Patient: Updated  (daughter) via phone.    Total Time Spent on Date of Encounter in care of patient: 45 mins. This time was spent on one or more of the following: performing physical exam; counseling and coordination of care; obtaining or reviewing history; documenting in the medical record; reviewing/ordering tests, medications or procedures; communicating with other healthcare professionals and discussing with patient's family/caregivers.    Current Length of Stay: 6 day(s)  Current Patient Status: Inpatient   Certification Statement: The patient will continue to require additional inpatient hospital stay  due to acute respiratory failure and severe sepsis, IV antibiotics  Discharge Plan: Anticipate discharge in 24-48 hrs to discharge location to be determined pending rehab evaluations.    Code Status: Level 1 - Full Code    Subjective:   Patient seen and examined.  He has no complaints offered other than that she is cold.  She said she is feeling better.     Objective:     Vitals:   Temp (24hrs), Av.8 °F (36.6 °C), Min:97.7 °F (36.5 °C), Max:97.9 °F (36.6 °C)    Temp:  [97.7 °F (36.5 °C)-97.9 °F (36.6 °C)] 97.9 °F (36.6 °C)  HR:  [68-74] 70  Resp:  [17-18] 18  BP: (128-178)/(78-82) 128/78  SpO2:  [92 %-94 %] 93 %  Body mass index is 37.45 kg/m².     Input and Output Summary (last 24 hours):     Intake/Output Summary (Last 24 hours) at 2024 1127  Last data filed at 2024 1045  Gross per 24 hour   Intake 600 ml   Output 2800 ml   Net -2200 ml       Physical Exam:   Physical Exam  Vitals and nursing note reviewed.   HENT:      Head: Normocephalic and atraumatic.      Mouth/Throat:      Pharynx: Oropharynx is clear.   Eyes:      Pupils: Pupils are equal, round, and reactive to light.   Cardiovascular:      Pulses: Normal pulses.      Heart sounds: Normal heart sounds.   Pulmonary:      Effort: Pulmonary effort is normal. No respiratory distress.      Breath sounds: Wheezing present.      Comments: Wheezing improved  Abdominal:      General: Bowel sounds are normal.      Palpations: Abdomen is soft.      Tenderness: There is no abdominal tenderness.   Musculoskeletal:      Cervical back: Neck supple.      Right lower leg: No edema.      Left lower leg: No edema.   Skin:     General: Skin is warm and dry.      Capillary Refill: Capillary refill takes less than 2 seconds.   Neurological:      General: No focal deficit present.      Mental Status: She is alert.        Additional Data:     Labs:  Results from last 7 days   Lab Units 24  0435 24  0444 24  0427 24  0353   WBC Thousand/uL  19.28* 17.56* 19.20* 19.94*   HEMOGLOBIN g/dL 13.1 12.3 12.0 10.7*   HEMATOCRIT % 39.8 38.1 36.7 33.1*   PLATELETS Thousands/uL 312 276 265 263   BANDS PCT %  --   --  1  --    NEUTROS PCT %  --   --   --  87*   LYMPHS PCT %  --   --   --  5*   LYMPHO PCT %  --  11* 11*  --    MONOS PCT %  --   --   --  6   MONO PCT %  --  3* 6  --    EOS PCT %  --  0 0 0     Results from last 7 days   Lab Units 01/07/24  0435 01/06/24  0444   SODIUM mmol/L 132* 133*   POTASSIUM mmol/L 4.7 4.8   CHLORIDE mmol/L 96 99   CO2 mmol/L 28 26   BUN mg/dL 50* 49*   CREATININE mg/dL 1.29 1.23   ANION GAP mmol/L 8 8   CALCIUM mg/dL 9.1 8.7   ALBUMIN g/dL  --  3.5   TOTAL BILIRUBIN mg/dL  --  0.56   ALK PHOS U/L  --  54   ALT U/L  --  48   AST U/L  --  19   GLUCOSE RANDOM mg/dL 197* 186*     Results from last 7 days   Lab Units 01/01/24  1643   INR  1.06     Results from last 7 days   Lab Units 01/07/24  1048 01/07/24  0747 01/06/24  2100 01/06/24  1602 01/06/24  1115 01/06/24  0722 01/05/24  2032 01/05/24  1739 01/05/24  1202 01/05/24  0529 01/04/24  2336 01/04/24  1748   POC GLUCOSE mg/dl 204* 175* 193* 185* 186* 132 139 198* 174* 174* 162* 141*     Results from last 7 days   Lab Units 01/01/24  1643   HEMOGLOBIN A1C % 6.8*     Results from last 7 days   Lab Units 01/07/24  0435 01/04/24  0354 01/03/24  0441 01/02/24  1131 01/02/24  0238 01/01/24  1719   LACTIC ACID mmol/L  --   --   --  0.9  --  1.8   PROCALCITONIN ng/ml 0.14 0.29* 0.31*  --  0.22 0.11       Lines/Drains:  Invasive Devices       Peripheral Intravenous Line  Duration             Peripheral IV 01/03/24 Distal;Left;Ventral (anterior) Forearm 3 days              Drain  Duration             External Urinary Catheter 1 day                    Recent Cultures (last 7 days):   Results from last 7 days   Lab Units 01/02/24  1309 01/02/24  0016 01/01/24  1719   BLOOD CULTURE   --   --  No Growth After 5 Days.  No Growth After 5 Days.   SPUTUM CULTURE  3+ Growth of  --   --    GRAM  STAIN RESULT  2+ Epithelial cells per low power field*  1+ Polys*  3+ Gram positive cocci in pairs*  2+ Gram negative rods*  1+ Gram positive cocci in clusters*  1+ Yeast*  --   --    LEGIONELLA URINARY ANTIGEN   --  Negative  --        Last 24 Hours Medication List:   Current Facility-Administered Medications   Medication Dose Route Frequency Provider Last Rate    acetaminophen  650 mg Oral Q6H PRN Mónica TARUN Lobato      benzonatate  100 mg Oral TID PRN MónicaTARUN Mckoy      cefepime  2,000 mg Intravenous Q12H MónicaLIZBETH CampbellNP 2,000 mg (01/07/24 0922)    dextromethorphan-guaiFENesin  10 mL Oral Q4H PRN MónicaTARUN Mckoy      guaiFENesin  600 mg Oral Q12H [facility] TARUN Vang      heparin (porcine)  5,000 Units Subcutaneous Q8H [facility] TARUN Vasquez      insulin lispro  1-6 Units Subcutaneous TID  Lang Barry PA-C      insulin lispro  1-6 Units Subcutaneous  Lang Barry PA-C      levalbuterol  1.25 mg Nebulization TID TARUN Vasquez      methylPREDNISolone sodium succinate  60 mg Intravenous Q12H [facility] TARUN Vasquez      ondansetron  4 mg Intravenous Q6H PRN TARUN Vasquez      pantoprazole  40 mg Intravenous Q24H [facility] MónicaTARUN Mckoy      senna-docusate sodium  2 tablet Oral HS TARUN Vasquez          Today, Patient Was Seen By: TARUN Vang    **Please Note: This note may have been constructed using a voice recognition system.**

## 2024-01-08 ENCOUNTER — HOME HEALTH ADMISSION (OUTPATIENT)
Dept: HOME HEALTH SERVICES | Facility: HOME HEALTHCARE | Age: 89
End: 2024-01-08
Payer: MEDICARE

## 2024-01-08 VITALS
HEART RATE: 67 BPM | RESPIRATION RATE: 18 BRPM | SYSTOLIC BLOOD PRESSURE: 153 MMHG | BODY MASS INDEX: 35.62 KG/M2 | TEMPERATURE: 98 F | DIASTOLIC BLOOD PRESSURE: 79 MMHG | OXYGEN SATURATION: 93 % | HEIGHT: 63 IN | WEIGHT: 201.06 LBS

## 2024-01-08 LAB
ANION GAP SERPL CALCULATED.3IONS-SCNC: 10 MMOL/L
BUN SERPL-MCNC: 50 MG/DL (ref 5–25)
CALCIUM SERPL-MCNC: 9 MG/DL (ref 8.4–10.2)
CHLORIDE SERPL-SCNC: 96 MMOL/L (ref 96–108)
CO2 SERPL-SCNC: 28 MMOL/L (ref 21–32)
CREAT SERPL-MCNC: 1.25 MG/DL (ref 0.6–1.3)
ERYTHROCYTE [DISTWIDTH] IN BLOOD BY AUTOMATED COUNT: 13.7 % (ref 11.6–15.1)
GFR SERPL CREATININE-BSD FRML MDRD: 37 ML/MIN/1.73SQ M
GLUCOSE SERPL-MCNC: 147 MG/DL (ref 65–140)
GLUCOSE SERPL-MCNC: 184 MG/DL (ref 65–140)
GLUCOSE SERPL-MCNC: 187 MG/DL (ref 65–140)
HCT VFR BLD AUTO: 40.7 % (ref 34.8–46.1)
HGB BLD-MCNC: 13.3 G/DL (ref 11.5–15.4)
MCH RBC QN AUTO: 29.8 PG (ref 26.8–34.3)
MCHC RBC AUTO-ENTMCNC: 32.7 G/DL (ref 31.4–37.4)
MCV RBC AUTO: 91 FL (ref 82–98)
PLATELET # BLD AUTO: 308 THOUSANDS/UL (ref 149–390)
PMV BLD AUTO: 9.3 FL (ref 8.9–12.7)
POTASSIUM SERPL-SCNC: 4.9 MMOL/L (ref 3.5–5.3)
PROCALCITONIN SERPL-MCNC: 0.16 NG/ML
RBC # BLD AUTO: 4.47 MILLION/UL (ref 3.81–5.12)
SODIUM SERPL-SCNC: 134 MMOL/L (ref 135–147)
WBC # BLD AUTO: 22.86 THOUSAND/UL (ref 4.31–10.16)

## 2024-01-08 PROCEDURE — 85027 COMPLETE CBC AUTOMATED: CPT | Performed by: NURSE PRACTITIONER

## 2024-01-08 PROCEDURE — 84145 PROCALCITONIN (PCT): CPT | Performed by: STUDENT IN AN ORGANIZED HEALTH CARE EDUCATION/TRAINING PROGRAM

## 2024-01-08 PROCEDURE — 94760 N-INVAS EAR/PLS OXIMETRY 1: CPT

## 2024-01-08 PROCEDURE — 82948 REAGENT STRIP/BLOOD GLUCOSE: CPT

## 2024-01-08 PROCEDURE — 94640 AIRWAY INHALATION TREATMENT: CPT

## 2024-01-08 PROCEDURE — 97110 THERAPEUTIC EXERCISES: CPT

## 2024-01-08 PROCEDURE — 99239 HOSP IP/OBS DSCHRG MGMT >30: CPT | Performed by: NURSE PRACTITIONER

## 2024-01-08 PROCEDURE — 92526 ORAL FUNCTION THERAPY: CPT

## 2024-01-08 PROCEDURE — C9113 INJ PANTOPRAZOLE SODIUM, VIA: HCPCS | Performed by: NURSE PRACTITIONER

## 2024-01-08 PROCEDURE — 80048 BASIC METABOLIC PNL TOTAL CA: CPT | Performed by: NURSE PRACTITIONER

## 2024-01-08 PROCEDURE — 94761 N-INVAS EAR/PLS OXIMETRY MLT: CPT

## 2024-01-08 PROCEDURE — 94668 MNPJ CHEST WALL SBSQ: CPT

## 2024-01-08 PROCEDURE — 94664 DEMO&/EVAL PT USE INHALER: CPT

## 2024-01-08 RX ORDER — BENZONATATE 100 MG/1
100 CAPSULE ORAL 3 TIMES DAILY PRN
Status: DISCONTINUED | OUTPATIENT
Start: 2024-01-08 | End: 2024-01-08

## 2024-01-08 RX ORDER — AMOXICILLIN 250 MG
2 CAPSULE ORAL
Qty: 30 TABLET | Refills: 0 | Status: SHIPPED | OUTPATIENT
Start: 2024-01-08 | End: 2024-02-07

## 2024-01-08 RX ORDER — HYDROCHLOROTHIAZIDE 12.5 MG/1
12.5 TABLET ORAL DAILY
Status: DISCONTINUED | OUTPATIENT
Start: 2024-01-08 | End: 2024-01-08 | Stop reason: HOSPADM

## 2024-01-08 RX ORDER — GUAIFENESIN/DEXTROMETHORPHAN 100-10MG/5
10 SYRUP ORAL EVERY 4 HOURS PRN
Status: DISCONTINUED | OUTPATIENT
Start: 2024-01-08 | End: 2024-01-08 | Stop reason: HOSPADM

## 2024-01-08 RX ORDER — BENZONATATE 100 MG/1
100 CAPSULE ORAL 3 TIMES DAILY
Qty: 20 CAPSULE | Refills: 0 | Status: SHIPPED | OUTPATIENT
Start: 2024-01-08

## 2024-01-08 RX ORDER — BENZONATATE 100 MG/1
100 CAPSULE ORAL 3 TIMES DAILY
Status: DISCONTINUED | OUTPATIENT
Start: 2024-01-08 | End: 2024-01-08 | Stop reason: HOSPADM

## 2024-01-08 RX ORDER — PREDNISONE 20 MG/1
40 TABLET ORAL DAILY
Status: DISCONTINUED | OUTPATIENT
Start: 2024-01-08 | End: 2024-01-08 | Stop reason: HOSPADM

## 2024-01-08 RX ORDER — GUAIFENESIN 600 MG/1
600 TABLET, EXTENDED RELEASE ORAL EVERY 12 HOURS SCHEDULED
Qty: 60 TABLET | Refills: 0 | Status: SHIPPED | OUTPATIENT
Start: 2024-01-08 | End: 2024-02-07

## 2024-01-08 RX ORDER — PREDNISONE 20 MG/1
TABLET ORAL DAILY
Qty: 10 TABLET | Refills: 0 | Status: SHIPPED | OUTPATIENT
Start: 2024-01-09 | End: 2024-01-17

## 2024-01-08 RX ORDER — LISINOPRIL 20 MG/1
20 TABLET ORAL DAILY
Status: DISCONTINUED | OUTPATIENT
Start: 2024-01-08 | End: 2024-01-08 | Stop reason: HOSPADM

## 2024-01-08 RX ADMIN — INSULIN LISPRO 1 UNITS: 100 INJECTION, SOLUTION INTRAVENOUS; SUBCUTANEOUS at 11:48

## 2024-01-08 RX ADMIN — LEVALBUTEROL HYDROCHLORIDE 1.25 MG: 1.25 SOLUTION RESPIRATORY (INHALATION) at 07:08

## 2024-01-08 RX ADMIN — METHYLPREDNISOLONE SODIUM SUCCINATE 60 MG: 125 INJECTION, POWDER, FOR SOLUTION INTRAMUSCULAR; INTRAVENOUS at 09:10

## 2024-01-08 RX ADMIN — PANTOPRAZOLE SODIUM 40 MG: 40 INJECTION, POWDER, FOR SOLUTION INTRAVENOUS at 09:10

## 2024-01-08 RX ADMIN — HEPARIN SODIUM 5000 UNITS: 5000 INJECTION INTRAVENOUS; SUBCUTANEOUS at 05:35

## 2024-01-08 RX ADMIN — GUAIFENESIN 600 MG: 600 TABLET ORAL at 09:10

## 2024-01-08 RX ADMIN — HYDROCHLOROTHIAZIDE 12.5 MG: 12.5 TABLET ORAL at 09:10

## 2024-01-08 RX ADMIN — LISINOPRIL 20 MG: 20 TABLET ORAL at 09:10

## 2024-01-08 RX ADMIN — BENZONATATE 100 MG: 100 CAPSULE ORAL at 10:57

## 2024-01-08 RX ADMIN — CEFEPIME HYDROCHLORIDE 2000 MG: 2 INJECTION, SOLUTION INTRAVENOUS at 09:11

## 2024-01-08 RX ADMIN — HEPARIN SODIUM 5000 UNITS: 5000 INJECTION INTRAVENOUS; SUBCUTANEOUS at 14:44

## 2024-01-08 NOTE — ASSESSMENT & PLAN NOTE
Sodium today 132  Sodium trended down to 120 on 1/2/2024 suspected to be multifactorial from HCTZ use in combination with SIADH due to PNA  HCTZ resumed on discharge  Repeat labs in 1 week via home health care

## 2024-01-08 NOTE — ASSESSMENT & PLAN NOTE
Currently saturating 95 to 97% on room air.  Required up to 8 L mid flow during acute illness.  Has been saturating well on room air  CT chest 1/1/2024: Attenuation of multiple small airways in the right lower lobe which could be related to infectious or inflammatory bronchitis, aspiration, and/or mucous plugging   No formal diagnosis of COPD  Chest x-ray 1/2/2024: Interstitial edema with development of retrocardiac opacity.  Chest x-ray 1/3/2024: Question mild pulmonary venous congestion with possible small left pleural effusion and left retrocardiac atelectasis  Continue Solumedrol 60 mg IV BID, transitioned to oral prednisone taper   S/p 7 days of cefepime IV  Status post 5 days azithromycin 500 mg IV and 4 days of vancomycin  Continue Mucinex  Pulmonology recommendations appreciated  Discharged to home with home health care and family support  Ambulatory referral to pulmonology

## 2024-01-08 NOTE — CASE MANAGEMENT
Case Management Discharge Planning Note    Patient name Dionne Zuluaga  Location /-01 MRN [id number]  : 1931 Date 2024       Current Admission Date: 2024  Current Admission Diagnosis:Acute respiratory failure with hypoxia and hypercapnia (HCC)   Patient Active Problem List    Diagnosis Date Noted    Dysphagia 2024    Acute respiratory failure with hypoxia and hypercapnia (HCC) 2024    Hyponatremia 2024    Severe sepsis (HCC) 2024    Elevated d-dimer 2024    Intrinsic eczema 2016    CKD stage G3b/A1, GFR 30-44 and albumin creatinine ratio <30 mg/g (HCC) 2013    Type 2 diabetes mellitus with stage 3 chronic kidney disease, without long-term current use of insulin (HCC) 10/25/2012    Tobacco use disorder 2012    Depression, major, in remission (HCC) 2012    Hematuria 2012    Hypertensive emergency 2012    Mixed hyperlipidemia 10/31/2011      LOS (days): 7  Geometric Mean LOS (GMLOS) (days): 5.1  Days to GMLOS:-1.8     OBJECTIVE:  Risk of Unplanned Readmission Score: 15.42         Current admission status: Inpatient   Preferred Pharmacy:   [id number] - [place], [place] - [address] [address]  Phone: [phone] Fax: [phone]    [id number] - [place], [place] - [address]  [address] [address]  Phone: [phone] Fax: [phone]    Primary Care Provider: Megan Barros DO    Primary Insurance: MEDICARE  Secondary Insurance: [facility]    DISCHARGE DETAILS:          DME Referral Provided  Referral made for DME?: Yes  DME referral completed for the following items:: Walker  DME Supplier Name:: [employer] ([email or web address])         As per Jacinta Salas at [employer] , pt is approved for walker but will have a copay of $101 .33. Pt was given the walker

## 2024-01-08 NOTE — RESPIRATORY THERAPY NOTE
Home Oxygen Qualifying Test     Patient name: Dionne Zuluaga        : 1931   Date of Test:  2024  Diagnosis: Pneumonia   Home Oxygen Test:    **Medicare Guidelines require item(s) 1-5 on all ambulatory patients or 1 and 2 on non-ambulatory patients.    1. Baseline SPO2 on Room Air at rest 94 %   If <= 88% on Room Air add O2 via NC to obtain SpO2 >=88%. If LPM needed, document LPM NA needed to reach =>88%    SPO2 during exertion on Room Air 93  %  During exertion monitor SPO2. If SPO2 increases >=89%, do not add supplemental oxygen    SPO2 on Oxygen at Rest NA % at NA LPM    SPO2 during exertion on Oxygen NA % at NA LPM    Test performed during exertion activity.      []  Supplemental Home Oxygen is indicated.    [x]  Client does not qualify for home oxygen.    Respiratory Additional Notes- tolerated well    Ally Weston, RRT

## 2024-01-08 NOTE — ASSESSMENT & PLAN NOTE
Sepsis parameters met with tachycardia, tachypnea, and leukocytosis   Blood cultures 1/1/2024: NGTD  Flu/RSV/COVID: Negative   Procal: 0.29  Imaging as above  Status post 7 days of cefepime IV

## 2024-01-08 NOTE — NURSING NOTE
IV site removed.  Discharge instructions given to patient and daughter, verbalized understanding.  Patient discharged via wheelchair to car accompanied by RN and daughter

## 2024-01-08 NOTE — OCCUPATIONAL THERAPY NOTE
"   01/08/24 1246   OT Last Visit   OT Visit Date 01/08/24   Note Type   Note Type Treatment  (completed [id number])   Pain Assessment   Pain Assessment Tool 0-10   Pain Score No Pain   Restrictions/Precautions   Weight Bearing Precautions Per Order No   Other Precautions Telemetry;O2;Fall Risk   Lifestyle   Reciprocal Relationships niece present for session   Intrinsic Gratification [id number]; game shows and news and HGTV on TV   ADL   LB Dressing Comments patient demos.'d ability to don/doff L sock with min. effort, *R sock with difficulty   Therapeutic Excerise-Strength   UE Strength Yes   Right Upper Extremity- Strength   R Shoulder Flexion;Horizontal ABduction;Other (Comment)  (horizontal adduction;  Pro/re-traction)   R Elbow Elbow flexion;Elbow extension  (in forward and reverse;  also: supin/pron-ation)   R Weight/Reps/Sets 2 pound weight - 10 reps shoulders and 15 reps elbows   Left Upper Extremity-Strength   L Weights/Reps/Sets all exers. same as RUE above   Coordination   Gross Motor WFL   Subjective   Subjective \"I think I'll be going home today\".   Cognition   Overall Cognitive Status WFL   Arousal/Participation Alert;Cooperative   Attention Within functional limits   Orientation Level Oriented X4   Following Commands Follows one step commands without difficulty   Activity Tolerance   Activity Tolerance Patient limited by fatigue   Assessment   Assessment Patient participated in Skilled OT session this date with interventions consisting of ADL re training with the use of correct body mechnaics, Energy Conservation techniques, therapeutic exercise to: increase functional use of BUEs, increase BUE muscle strength , increase dynamic sit/ stand balance during functional activity , and increase postural control . Patient agreeable to OT treatment session, upon arrival patient was found seated OOB to Recliner.  In comparison to previous session, patient with improvements in activity tolerance and self-care " accomplishment.  Patient requiring verbal cues for correct technique, verbal cues for pacing thru activity steps, frequent rest periods, and ocassional safety reminders, and self-care assistance as noted in flow sheet/AM-PAC. Patient continues to be functioning below baseline level, occupational performance remains limited secondary to factors listed above and increased risk for falls and injury.  Patient to benefit from continued Occupational Therapy treatment while in the hospital to address deficits as defined above and maximize level of functional independence with ADLs and functional mobility.   Plan   Treatment Interventions UE strengthening/ROM;Patient/family training;Activityengagement;Energy conservation;Compensatory technique education   Goal Expiration Date 01/15/24   OT Treatment Day 1   Discharge Recommendation   Rehab Resource Intensity Level, OT II (Moderate Resource Intensity)   Additional Comments 2 The patient's raw score on the AM-PAC Daily Activity Inpatient Short Form is 20. A raw score of greater than or equal to 19 suggests the patient may benefit from discharge to home. Please refer to the recommendation of the Occupational Therapist for safe discharge planning.   AM-PAC Daily Activity Inpatient   Lower Body Dressing 3   Bathing 3   Toileting 3   Upper Body Dressing 3   Grooming 4   Eating 4   Daily Activity Raw Score 20   Daily Activity Standardized Score (Calc for Raw Score >=11) 42.03   AM-PAC Applied Cognition Inpatient   Following a Speech/Presentation 3   Understanding Ordinary Conversation 4   Taking Medications 4   Remembering Where Things Are Placed or Put Away 3   Remembering List of 4-5 Errands 3   Taking Care of Complicated Tasks 2   Applied Cognition Raw Score 19   Applied Cognition Standardized Score 39.77       MARIE Rene/MELINDA

## 2024-01-08 NOTE — CASE MANAGEMENT
Case Management Discharge Planning Note    Patient name Dionne Zuluaga  Location /-01 MRN [id number]  : 1931 Date 2024       Current Admission Date: 2024  Current Admission Diagnosis:Acute respiratory failure with hypoxia and hypercapnia (HCC)   Patient Active Problem List    Diagnosis Date Noted    Dysphagia 2024    Acute respiratory failure with hypoxia and hypercapnia (HCC) 2024    Hyponatremia 2024    Severe sepsis (HCC) 2024    Elevated d-dimer 2024    Intrinsic eczema 2016    CKD stage G3b/A1, GFR 30-44 and albumin creatinine ratio <30 mg/g (HCC) 2013    Type 2 diabetes mellitus with stage 3 chronic kidney disease, without long-term current use of insulin ([facility]) 10/25/2012    Tobacco use disorder 2012    Depression, major, in remission ([facility]) 2012    Hematuria 2012    Hypertensive emergency 2012    Mixed hyperlipidemia 10/31/2011      LOS (days): 7  Geometric Mean LOS (GMLOS) (days): 5.1  Days to GMLOS:-1.8     OBJECTIVE:  Risk of Unplanned Readmission Score: 14.74         Current admission status: Inpatient   Preferred Pharmacy:   [id number] - [place], [place] - [address]  [address]  [address] [address]  Phone: [phone] Fax: [phone]    [id number] - [place], [place] - [address]  [address] [address]  Phone: [phone] Fax: [phone]    Primary Care Provider: Megan Barros DO    Primary Insurance: MEDICARE  Secondary Insurance: [facility]    DISCHARGE DETAILS:      DME Referral Provided  Referral made for DME?: Yes  DME referral completed for the following items:: Walker  [facility]          Pt will need a walker on discharge. Order placed in [place]. Pt was given a walker from Consignment closet.

## 2024-01-08 NOTE — PLAN OF CARE
Problem: PAIN - ADULT  Goal: Verbalizes/displays adequate comfort level or baseline comfort level  Description: Interventions:  - Encourage patient to monitor pain and request assistance  - Assess pain using appropriate pain scale  - Administer analgesics based on type and severity of pain and evaluate response  - Implement non-pharmacological measures as appropriate and evaluate response  - Consider cultural and social influences on pain and pain management  - Notify physician/advanced practitioner if interventions unsuccessful or patient reports new pain  1/7/2024 1928 by Ramandeep Geiger RN  Outcome: Progressing  1/7/2024 1927 by Ramandeep Geiger RN  Outcome: Progressing

## 2024-01-08 NOTE — PLAN OF CARE
Problem: OCCUPATIONAL THERAPY ADULT  Goal: Performs self-care activities at highest level of function for planned discharge setting.  See evaluation for individualized goals.  Description: Treatment Interventions: ADL retraining, Functional transfer training, UE strengthening/ROM, Endurance training, Patient/family training, Energy conservation, Activityengagement          See flowsheet documentation for full assessment, interventions and recommendations.   Outcome: Progressing  Note: Limitation: Decreased ADL status, Decreased UE strength, Decreased Safe judgement during ADL, Decreased endurance, Decreased self-care trans, Decreased high-level ADLs  Prognosis: Good  Assessment: Patient participated in Skilled OT session this date with interventions consisting of ADL re training with the use of correct body mechnaics, Energy Conservation techniques, therapeutic exercise to: increase functional use of BUEs, increase BUE muscle strength , increase dynamic sit/ stand balance during functional activity , and increase postural control . Patient agreeable to OT treatment session, upon arrival patient was found seated OOB to Recliner.  In comparison to previous session, patient with improvements in activity tolerance and self-care accomplishment.  Patient requiring verbal cues for correct technique, verbal cues for pacing thru activity steps, frequent rest periods, and ocassional safety reminders, and self-care assistance as noted in flow sheet/AM-PAC. Patient continues to be functioning below baseline level, occupational performance remains limited secondary to factors listed above and increased risk for falls and injury.  Patient to benefit from continued Occupational Therapy treatment while in the hospital to address deficits as defined above and maximize level of functional independence with ADLs and functional mobility.     Rehab Resource Intensity Level, OT: II (Moderate Resource Intensity)        Laine Lopez  SALAZAR/L

## 2024-01-08 NOTE — SPEECH THERAPY NOTE
"Speech Language/Pathology  Speech/Language Pathology Progress Note    Patient Name: Dionne Zuluaga  Today's Date: 1/8/2024     Problem List  Principal Problem:    Acute respiratory failure with hypoxia and hypercapnia (HCC)  Active Problems:    Hypertensive emergency    Tobacco use disorder    Type 2 diabetes mellitus with stage 3 chronic kidney disease, without long-term current use of insulin (HCC)    Hyponatremia    Severe sepsis (HCC)    Elevated d-dimer    Dysphagia    Past Medical History  Past Medical History:   Diagnosis Date    Hyperlipidemia     Hypertension      Past Surgical History  Past Surgical History:   Procedure Laterality Date    HYSTERECTOMY  1974       Subjective:  Patient received sitting upright in bed consuming breakfast tray.  Patient endorses a good overall appetite, tolerance of food items and chronic cough productive that she i'ly suctions.    Objective:  Dental soft:  Pt consuming blueberry muffin with appropriate oral reception, transfer time and swallow timing all WFL.    Thin via straw:  Pt demonstrates functional swallow for thin liquids.    Assessment:  Cough occurs prior to PO intake, following PO intake, and hours after meals.  She reports she has always \"had a little cough\" but does admit this has worsened since intubation/extubation last week.  Pt's cough is strong and productive, clearing secretions that are clear to brown in color.  Pt's vocal quality is stronger than last assessment, no further ST warranted.    Plan/Recommendations:  Reg/thin, no ST warranted     "

## 2024-01-08 NOTE — CASE MANAGEMENT
Case Management Discharge Planning Note    Patient name Dionne Zuluaga  Location /-01 MRN [id number]  : 1931 Date 2024       Current Admission Date: 2024  Current Admission Diagnosis:Acute respiratory failure with hypoxia and hypercapnia (HCC)   Patient Active Problem List    Diagnosis Date Noted    Dysphagia 2024    Acute respiratory failure with hypoxia and hypercapnia (HCC) 2024    Hyponatremia 2024    Severe sepsis (HCC) 2024    Elevated d-dimer 2024    Intrinsic eczema 2016    CKD stage G3b/A1, GFR 30-44 and albumin creatinine ratio <30 mg/g (HCC) 2013    Type 2 diabetes mellitus with stage 3 chronic kidney disease, without long-term current use of insulin (HCC) 10/25/2012    Tobacco use disorder 2012    Depression, major, in remission ([facility]) 2012    Hematuria 2012    Hypertensive emergency 2012    Mixed hyperlipidemia 10/31/2011      LOS (days): 7  Geometric Mean LOS (GMLOS) (days): 5.1  Days to GMLOS:-1.6     OBJECTIVE:  Risk of Unplanned Readmission Score: 14.74         Current admission status: Inpatient   Preferred Pharmacy:   [id number] - [place], [place] - [address] [address]  Phone: [phone] Fax: [phone]    [id number] - [place], [place] - [address]  [address] [address]  Phone: [phone] Fax: [phone]    Primary Care Provider: Megan Barros DO    Primary Insurance: MEDICARE  Secondary Insurance: [facility]    DISCHARGE DETAILS:    Discharge planning discussed with:: Pt, daughters  Freedom of Choice: Yes     CM contacted family/caregiver?: Yes  Were Treatment Team discharge recommendations reviewed with patient/caregiver?: Yes  Did patient/caregiver verbalize understanding of patient care needs?: Yes  Were patient/caregiver advised of the risks associated with not following Treatment Team  discharge recommendations?: Yes    Contacts  Patient Contacts: Cheri Mendez - daughter  Relationship to Patient:: Family  Contact Method: In Person  Reason/Outcome: Discharge Planning    Requested Home Health Care         Is the patient interested in [facility] at discharge?: Yes  Home Health Discipline requested:: Nursing, Occupational Therapy, Physical Therapy  Home Health Agency Name:: [facility] Follow-Up Provider:: PCP  Home Health Services Needed:: Evaluate Functional Status and Safety, Gait/ADL Training, Strengthening/Theraputic Exercises to Improve Function  Homebound Criteria Met:: Requires the Assistance of Another Person for Safe Ambulation or to Leave the Home  Supporting Clincal Findings:: Limited Endurance, Fatigues Easliy in Short Distances    DME Referral Provided  Referral made for DME?: No         Would you like to participate in our [facility] service program?  : No - Declined    Treatment Team Recommendation: Short Term Rehab  Discharge Destination Plan:: Home with Home Health Care  Transport at Discharge : Family                                      Additional Comments: CM met with pt and daughters at bedside to discuss discharge planning. STR recommended. Pt and daughters not in agremeent with same. Agreeable to [facility]. They requested referral to [facility]. Referral sent and they are able to accept. Pt and daughters aware and in agreement. Imelda from [facility] updated.

## 2024-01-08 NOTE — DISCHARGE SUMMARY
[facility]  Discharge- Dionne Zuluaga 4/25/1931, 89 y.o. female MRN: [id number]  Unit/Bed#: -01 Encounter: [id number]  Primary Care Provider: Megan Barros DO   Date and time admitted to hospital: 1/1/2024  4:10 PM    * Acute respiratory failure with hypoxia and hypercapnia (HCC)  Assessment & Plan  Currently saturating 95 to 97% on room air.  Required up to 8 L mid flow during acute illness.  Has been saturating well on room air  CT chest 1/1/2024: Attenuation of multiple small airways in the right lower lobe which could be related to infectious or inflammatory bronchitis, aspiration, and/or mucous plugging   No formal diagnosis of COPD  Chest x-ray 1/2/2024: Interstitial edema with development of retrocardiac opacity.  Chest x-ray 1/3/2024: Question mild pulmonary venous congestion with possible small left pleural effusion and left retrocardiac atelectasis  Continue Solumedrol 60 mg IV BID, transitioned to oral prednisone taper   S/p 7 days of cefepime IV  Status post 5 days azithromycin 500 mg IV and 4 days of vancomycin  Continue Mucinex  Pulmonology recommendations appreciated  Discharged to home with home health care and family support  Ambulatory referral to pulmonology    Severe sepsis (HCC)  Assessment & Plan  Sepsis parameters met with tachycardia, tachypnea, and leukocytosis   Blood cultures 1/1/2024: NGTD  Flu/RSV/COVID: Negative   Procal: 0.29  Imaging as above  Status post 7 days of cefepime IV      Dysphagia  Assessment & Plan  Speech evaluation appreciated.  Regular diet with thin liquids  Continue to monitor    Elevated d-dimer  Assessment & Plan  D-dimer was 2.98  CTA was limited by motion artifact but no central PE was identified     Hyponatremia  Assessment & Plan  Sodium today 132  Sodium trended down to 120 on 1/2/2024 suspected to be multifactorial from HCTZ use in combination with SIADH due to PNA  HCTZ resumed on discharge  Repeat labs in 1 week via home health  care    Type 2 diabetes mellitus with stage 3 chronic kidney disease, without long-term current use of insulin ([facility])  Assessment & Plan  Lab Results   Component Value Date    HGBA1C 6.8 (H) 01/01/2024       Recent Labs     01/07/24  1549 01/07/24  2103 01/08/24  0743 01/08/24  1055   POCGLU 165* 179* 147* 184*         Blood Sugar Average: Last 72 hrs:  (P) 173.8161079426110742    Carb controlled diet  Resume home medication    Tobacco use disorder  Assessment & Plan  Counseled patient on importance of smoking cessation  Offered nicotine replacement which she declined    Hypertensive emergency  Assessment & Plan  BP reviewed and acceptable   Continue home lisinopril 20 mg daily and   Home hydrochlorothiazide held due to hyponatremia.  Will resume on discharge  Continue outpatient follow-up with PCP        Medical Problems       Resolved Problems  Date Reviewed: 1/8/2024   None       Discharging Physician / Practitioner: TARUN Vang  PCP: Megan Barros DO  Admission Date:   Admission Orders (From admission, onward)       Ordered        01/01/24 1808  INPATIENT ADMISSION  Once                          Discharge Date: 01/08/24    Consultations During Hospital Stay:  Critical care, palliative    Procedures Performed:   Intubation 1/2/2024  Extubation 1/4/2024    Significant Findings / Test Results:   Chest x-ray 1/2/2024: Minimal bibasilar subsegmental atelectasis  CTA chest PE study 1/1/2024: Some images are suboptimal secondary to respiratory motion with decreased sensitivity for evaluation of peripheral pulmonary emboli, similar to this, no central pulmonary embolism is seen.  Attenuation of multiple small airways in the right lower lobe which could be related to infectious or inflammatory bronchitis, aspiration, and/or mucous plugging  Chest x-ray portable ICU 1/2/2024: Interstitial edema with development of retrocardiac opacity  Chest x-ray portable ICU 1/3/2024: Life support tubes in place.  Question mild  pulmonary venous congestion with possible small left pleural effusion and left retrocardiac atelectasis     Outpatient Tests Requested:  BMP and CBC in 1 week via home health    Complications: None apparent    Reason for Admission: Acute hypoxic respiratory failure and sepsis due to pneumonia    Hospital Course:   Dionne Zuluaga is a 89 y.o. female patient who originally presented to the hospital on 1/1/2024 due to shortness of breath.  She was hypoxic on arrival with wheezing and rhonchi.  Imaging performed in the ER revealed evidence for right lower lobe infiltrate.  She met sepsis criteria. She was started on IV antibiotics and IV steroids.  She was also noted to have hyponatremia and an elevated D-dimer.  CTA of the chest was negative for central pulmonary embolism.  She was admitted to medicine.  Shortly after admission she had a deterioration index alert. She was updated to SD2 level of care.  IV antibiotics were expanded and palliative was consulted for goals of care discussion.  At that time patient preferred to try BiPAP rather than intubation.  She did not improve and was intubated shortly thereafter on 1/2/2024.  She was upgraded to the critical care service.  After stabilization a trial of extubation was conducted on 1/4/2024.  She was extubated to 6 L nasal cannula.  Currently, she improved and was downgraded to the medical service on 1/6/2024.  She was evaluated by therapy for moderate resource intensity.  Her daughters preferred to take her home with home health care.  On the day of discharge, patient was ambulatory and saturating well on room air.  Antibiotics were completed and steroids were transitioned to oral prednisone taper. she was eating well.  She was discharged home to the care of her daughters in stable condition.  Patient and her daughters at bedside verbalized understanding of discharge instructions including reasons to return to the hospital.  Patient was again counseled on smoking  "cessation and pulmonary hygiene health maintenance activities including healthy diet and gentle exercise were reinforced.  Ambulatory referral to pulmonology was given at family request. This is a brief discharge summary.  Please see full patient chart for additional details    Condition at Discharge: stable    Discharge Day Visit / Exam:   Subjective: Patient seen and examined.  She said she is feeling good, shortness of breath is improved, she is able to cough and clear mucus.  Ate fairly well today, had a bowel movement, voiding well.     Vitals: Blood Pressure: 153/79 (01/08/24 0742)  Pulse: 67 (01/08/24 0742)  Temperature: 98 °F (36.7 °C) (01/08/24 0742)  Temp Source: Oral (01/06/24 2243)  Respirations: 18 (01/08/24 0742)  Height: 5' 3\" (160 cm) (01/01/24 2114)  Weight - Scale: 91.2 kg (201 lb 1 oz) (01/08/24 0542)  SpO2: 93 % (01/08/24 0955)    Exam:   Physical Exam  Vitals and nursing note reviewed.   Constitutional:       General: She is not in acute distress.     Appearance: She is not ill-appearing.   HENT:      Head: Normocephalic and atraumatic.      Nose: Nose normal.      Mouth/Throat:      Mouth: Mucous membranes are moist.      Pharynx: Oropharynx is clear.   Eyes:      Pupils: Pupils are equal, round, and reactive to light.   Cardiovascular:      Rate and Rhythm: Normal rate and regular rhythm.      Pulses: Normal pulses.      Heart sounds: Normal heart sounds.   Pulmonary:      Effort: Pulmonary effort is normal. No respiratory distress.      Breath sounds: Normal breath sounds. No wheezing or rhonchi.   Abdominal:      General: Bowel sounds are normal.      Palpations: Abdomen is soft.      Tenderness: There is no abdominal tenderness.   Musculoskeletal:      Cervical back: Neck supple.      Right lower leg: No edema.      Left lower leg: No edema.   Skin:     General: Skin is warm and dry.      Capillary Refill: Capillary refill takes less than 2 seconds.   Neurological:      General: No focal " deficit present.      Mental Status: She is alert and oriented to person, place, and time.        Discussion with Family: Updated  (daughter) at bedside.    Discharge instructions/Information to patient and family:   See after visit summary for information provided to patient and family.      Provisions for Follow-Up Care:  See after visit summary for information related to follow-up care and any pertinent home health orders.      Mobility at time of Discharge:   Basic Mobility Inpatient Raw Score: 18  JH-HLM Goal: 6: Walk 10 steps or more  JH-HLM Achieved: 6: Walk 10 steps or more  HLM Goal achieved. Continue to encourage appropriate mobility.     Disposition:   Home with VNA Services (Reminder: Complete face to face encounter)    Planned Readmission: No     Discharge Statement:  I spent 50 minutes discharging the patient. This time was spent on the day of discharge. I had direct contact with the patient on the day of discharge. Greater than 50% of the total time was spent examining patient, answering all patient questions, arranging and discussing plan of care with patient as well as directly providing post-discharge instructions.  Additional time then spent on discharge activities.    Discharge Medications:  See after visit summary for reconciled discharge medications provided to patient and/or family.      **Please Note: This note may have been constructed using a voice recognition system**

## 2024-01-08 NOTE — ASSESSMENT & PLAN NOTE
BP reviewed and acceptable   Continue home lisinopril 20 mg daily and   Home hydrochlorothiazide held due to hyponatremia.  Will resume on discharge  Continue outpatient follow-up with PCP

## 2024-01-08 NOTE — ASSESSMENT & PLAN NOTE
Lab Results   Component Value Date    HGBA1C 6.8 (H) 01/01/2024       Recent Labs     01/07/24  1549 01/07/24  2103 01/08/24  0743 01/08/24  1055   POCGLU 165* 179* 147* 184*         Blood Sugar Average: Last 72 hrs:  (P) [id number]    Carb controlled diet  Resume home medication

## 2024-01-09 ENCOUNTER — HOME CARE VISIT (OUTPATIENT)
Dept: HOME HEALTH SERVICES | Facility: HOME HEALTHCARE | Age: 89
End: 2024-01-09

## 2024-01-09 LAB
DME PARACHUTE DELIVERY DATE ACTUAL: NORMAL
DME PARACHUTE DELIVERY DATE REQUESTED: NORMAL
DME PARACHUTE DELIVERY NOTE: NORMAL
DME PARACHUTE ITEM DESCRIPTION: NORMAL
DME PARACHUTE ORDER STATUS: NORMAL
DME PARACHUTE SUPPLIER NAME: NORMAL
DME PARACHUTE SUPPLIER PHONE: NORMAL

## 2024-01-10 ENCOUNTER — HOME CARE VISIT (OUTPATIENT)
Dept: HOME HEALTH SERVICES | Facility: HOME HEALTHCARE | Age: 89
End: 2024-01-10
Payer: MEDICARE

## 2024-01-10 VITALS
HEART RATE: 68 BPM | WEIGHT: 180 LBS | OXYGEN SATURATION: 90 % | HEIGHT: 63 IN | DIASTOLIC BLOOD PRESSURE: 72 MMHG | SYSTOLIC BLOOD PRESSURE: 128 MMHG | BODY MASS INDEX: 31.89 KG/M2 | RESPIRATION RATE: 18 BRPM | TEMPERATURE: 97.9 F

## 2024-01-10 PROCEDURE — 10330081 VN NO-PAY CLAIM PROCEDURE

## 2024-01-10 PROCEDURE — G0299 HHS/HOSPICE OF RN EA 15 MIN: HCPCS

## 2024-01-10 PROCEDURE — 400013 VN SOC

## 2024-01-11 ENCOUNTER — HOME CARE VISIT (OUTPATIENT)
Dept: HOME HEALTH SERVICES | Facility: HOME HEALTHCARE | Age: 89
End: 2024-01-11
Payer: MEDICARE

## 2024-01-11 VITALS — DIASTOLIC BLOOD PRESSURE: 70 MMHG | HEART RATE: 70 BPM | OXYGEN SATURATION: 92 % | SYSTOLIC BLOOD PRESSURE: 122 MMHG

## 2024-01-11 PROCEDURE — G0152 HHCP-SERV OF OT,EA 15 MIN: HCPCS | Performed by: OCCUPATIONAL THERAPIST

## 2024-01-12 ENCOUNTER — HOME CARE VISIT (OUTPATIENT)
Dept: HOME HEALTH SERVICES | Facility: HOME HEALTHCARE | Age: 89
End: 2024-01-12
Payer: MEDICARE

## 2024-01-12 VITALS — DIASTOLIC BLOOD PRESSURE: 58 MMHG | SYSTOLIC BLOOD PRESSURE: 120 MMHG | HEART RATE: 71 BPM | OXYGEN SATURATION: 97 %

## 2024-01-12 PROCEDURE — G0151 HHCP-SERV OF PT,EA 15 MIN: HCPCS

## 2024-01-12 PROCEDURE — G0299 HHS/HOSPICE OF RN EA 15 MIN: HCPCS

## 2024-01-14 VITALS
OXYGEN SATURATION: 93 % | TEMPERATURE: 96.8 F | DIASTOLIC BLOOD PRESSURE: 60 MMHG | HEART RATE: 65 BPM | RESPIRATION RATE: 20 BRPM | SYSTOLIC BLOOD PRESSURE: 110 MMHG

## 2024-01-15 ENCOUNTER — HOME CARE VISIT (OUTPATIENT)
Dept: HOME HEALTH SERVICES | Facility: HOME HEALTHCARE | Age: 89
End: 2024-01-15
Payer: MEDICARE

## 2024-01-15 PROCEDURE — G0299 HHS/HOSPICE OF RN EA 15 MIN: HCPCS

## 2024-01-15 NOTE — CASE COMMUNICATION
[facility] OT evaluation 1/11/24.    Patient denies need for upper extremity strengthening or ADL training as she is comfortable with current upper extremity strength and current level of function with performance of daily activities.    No further OT services are planned at this time as patient denies need for additional OT services.

## 2024-01-16 ENCOUNTER — HOME CARE VISIT (OUTPATIENT)
Dept: HOME HEALTH SERVICES | Facility: HOME HEALTHCARE | Age: 89
End: 2024-01-16
Payer: MEDICARE

## 2024-01-16 VITALS — DIASTOLIC BLOOD PRESSURE: 64 MMHG | SYSTOLIC BLOOD PRESSURE: 112 MMHG | HEART RATE: 64 BPM | OXYGEN SATURATION: 95 %

## 2024-01-16 PROCEDURE — G0151 HHCP-SERV OF PT,EA 15 MIN: HCPCS

## 2024-01-17 ENCOUNTER — HOME CARE VISIT (OUTPATIENT)
Dept: HOME HEALTH SERVICES | Facility: HOME HEALTHCARE | Age: 89
End: 2024-01-17
Payer: MEDICARE

## 2024-01-18 ENCOUNTER — HOME CARE VISIT (OUTPATIENT)
Dept: HOME HEALTH SERVICES | Facility: HOME HEALTHCARE | Age: 89
End: 2024-01-18
Payer: MEDICARE

## 2024-01-18 VITALS — OXYGEN SATURATION: 98 % | HEART RATE: 78 BPM | DIASTOLIC BLOOD PRESSURE: 60 MMHG | SYSTOLIC BLOOD PRESSURE: 114 MMHG

## 2024-01-18 PROCEDURE — G0299 HHS/HOSPICE OF RN EA 15 MIN: HCPCS

## 2024-01-18 PROCEDURE — G0151 HHCP-SERV OF PT,EA 15 MIN: HCPCS

## 2024-01-20 PROCEDURE — G0180 MD CERTIFICATION HHA PATIENT: HCPCS | Performed by: INTERNAL MEDICINE

## 2024-01-21 VITALS
RESPIRATION RATE: 20 BRPM | TEMPERATURE: 98.8 F | OXYGEN SATURATION: 96 % | DIASTOLIC BLOOD PRESSURE: 62 MMHG | HEART RATE: 76 BPM | SYSTOLIC BLOOD PRESSURE: 122 MMHG

## 2024-01-23 ENCOUNTER — HOME CARE VISIT (OUTPATIENT)
Dept: HOME HEALTH SERVICES | Facility: HOME HEALTHCARE | Age: 89
End: 2024-01-23
Payer: MEDICARE

## 2024-01-23 VITALS — SYSTOLIC BLOOD PRESSURE: 114 MMHG | DIASTOLIC BLOOD PRESSURE: 68 MMHG

## 2024-01-23 PROCEDURE — G0151 HHCP-SERV OF PT,EA 15 MIN: HCPCS

## 2024-01-23 NOTE — CASE COMMUNICATION
Patient is discharged from [facility] PT to her HEP at this time. She has progressed well and is indep in home mobility with SPC and indep in HEP.  Tinettis score increase from 15/28 on eval to 26/28 on discharges indicates progress as well as that patient is currently at low risk of falls.  SPC recommended for max safety.

## 2024-01-24 ENCOUNTER — OFFICE VISIT (OUTPATIENT)
Dept: PULMONOLOGY | Facility: CLINIC | Age: 89
End: 2024-01-24
Payer: MEDICARE

## 2024-01-24 ENCOUNTER — TELEPHONE (OUTPATIENT)
Age: 89
End: 2024-01-24

## 2024-01-24 VITALS
DIASTOLIC BLOOD PRESSURE: 60 MMHG | HEART RATE: 73 BPM | OXYGEN SATURATION: 96 % | SYSTOLIC BLOOD PRESSURE: 108 MMHG | TEMPERATURE: 98.7 F

## 2024-01-24 DIAGNOSIS — J18.9 PNEUMONIA: ICD-10-CM

## 2024-01-24 DIAGNOSIS — R05.3 CHRONIC COUGH: ICD-10-CM

## 2024-01-24 DIAGNOSIS — F17.211 CIGARETTE NICOTINE DEPENDENCE IN REMISSION: Primary | ICD-10-CM

## 2024-01-24 DIAGNOSIS — J96.01 ACUTE HYPOXIC RESPIRATORY FAILURE (HCC): ICD-10-CM

## 2024-01-24 PROBLEM — R65.20 SEVERE SEPSIS (HCC): Status: RESOLVED | Noted: 2024-01-01 | Resolved: 2024-01-24

## 2024-01-24 PROBLEM — A41.9 SEVERE SEPSIS (HCC): Status: RESOLVED | Noted: 2024-01-01 | Resolved: 2024-01-24

## 2024-01-24 PROBLEM — R79.89 ELEVATED D-DIMER: Status: RESOLVED | Noted: 2024-01-01 | Resolved: 2024-01-24

## 2024-01-24 PROBLEM — J96.02 ACUTE RESPIRATORY FAILURE WITH HYPOXIA AND HYPERCAPNIA (HCC): Status: RESOLVED | Noted: 2024-01-01 | Resolved: 2024-01-24

## 2024-01-24 PROCEDURE — 99204 OFFICE O/P NEW MOD 45 MIN: CPT | Performed by: INTERNAL MEDICINE

## 2024-01-24 PROCEDURE — 94010 BREATHING CAPACITY TEST: CPT | Performed by: INTERNAL MEDICINE

## 2024-01-24 NOTE — PROGRESS NOTES
Pulmonary Consultation   Dionne Zuluaga 89 y.o. female MRN: [id number]  1/24/2024      Reason for Consultation:  Recent hospital admission for respiratory failure secondary to pneumonia    Requested by:  FERN Vang    PCP: Megan Barros DO      Assessment/Plan:    Recent respiratory failure requiring intubation  Likely secondary to bronchospasm due to viral or bacterial pneumonia and active tobacco use.   Patient was intubated for 2 days. She was weaned off oxygen prior to being discharged from the hospital.  She is doing well today. SpO2 96% on RA.     Chronic cough  Several years with productive cough and wheezing as well as ANTON.   Almost completely resolved since being discharged from the hospital and smoking cessation.   Recommend continuing abstinence from smoking at this time.   POCT spirometry ordered with FEV1/FVC ratio 73% (LLN 60%. FVC76% predicted, FEV1 74% predicted, consistent with normal spirometry.    Cigarette nicotine dependence  Reports smoking half/pack per day since she was 17 years old (38 pack/year history).  Quit a few weeks ago after being discharged from the hospital.        History of Present Illness   HPI:  Dionne Zuluaga is a 89 y.o. female with a PMHx of tobacco use, HLD, HTN, T2DM, who comes to the office today after being referred by hospitalist during recent admission for respiratory failure secondary to pneumonia. Referral requested by patient's family.    Patient was admitted at [facility] from 1/1-1/8 after presenting with worsening shortness of breath, wheezing and nonproductive cough that had started a few days prior. Patient was hypoxic on arrival to the ED requiring supplemental O2 by NC. Lab workup on admission remarkable for hyponatremia (121), leukocytosis with WBC 17 and neutrophils 86%, elevated d-dimer. CTA PE study with no evidence of PE. COVID/flu/RSV negative. Patient was originally admitted to the medical service meeting sepsis criteria. She was  started on IV antibiotics. Additionally, due to the smoking history and wheezing on exam, patient was also started on IV SoluMedrol for possible undiagnosed COPD. The following day, patient had respiratory distress requiring a trial of BiPAP with no improvement, for which she required intubation and transfer to the ICU. She was extubated on 01/04. She was completely weaned off of supplemental oxygen prior to discharge.    Today, patient is accompanied by her daughters. They report prior to hospital admission patient had significant productive cough for several years accompanied by dyspnea on exertion and wheezing. Since being discharged from the hospital she has been feeling a lot better and the cough is almost completely resolved. ANTON has also improved, however she has not been very active since her hospital admission.     Patient reports smoking since the age of 17 until this recent hospital admission approximately half/pack per day. She has no known history of asthma and has never been officially diagnosed with COPD. She has never been on inhalers. She worked in the [employer] industry for 36 years and denies any known occupational exposures to chemicals or fumes. She had a cat a long time ago but no longer has any pets.     Review of Systems   Constitutional:  Negative for chills and fever.   HENT: Negative.     Respiratory:  Positive for cough and shortness of breath (occasionally, on exertion).    Cardiovascular:  Negative for chest pain and palpitations.   Skin:  Negative for pallor and rash.   Neurological:  Negative for dizziness and light-headedness.   Hematological: Negative.    Psychiatric/Behavioral: Negative.          Historical Information   Past Medical History:   Diagnosis Date    Hyperlipidemia     Hypertension      Past Surgical History:   Procedure Laterality Date    HYSTERECTOMY  1974     History reviewed. No pertinent family history.    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     Social History: Smoker. Quit a few weeks ago.    Social History     Tobacco Use   Smoking Status Former    Types: Cigarettes    Start date: 2023    Quit date: 1999    Years since quittin.0   Smokeless Tobacco Never       Meds/Allergies     Current Outpatient Medications:     calcium citrate-vitamin D (CITRACAL+D) 315-200 MG-UNIT per tablet, Take 1 tablet by mouth 2 (two) times a day, Disp: , Rfl:     guaiFENesin (MUCINEX) 600 mg 12 hr tablet, Take 1 tablet (600 mg total) by mouth every 12 (twelve) hours, Disp: 60 tablet, Rfl: 0    lisinopril-hydrochlorothiazide (PRINZIDE,ZESTORETIC) 20-12.5 MG per tablet, , Disp: , Rfl:     Multiple Vitamin (MULTIVITAMIN) tablet, Take 1 tablet by mouth daily, Disp: , Rfl:     Omega-3 Fatty Acids (FISH OIL) 1,000 mg, Take 1,000 mg by mouth daily, Disp: , Rfl:     senna-docusate sodium (SENOKOT S) 8.6-50 mg per tablet, Take 2 tablets by mouth daily at bedtime as needed for constipation (constipation), Disp: 30 tablet, Rfl: 0    simvastatin (ZOCOR) 40 mg tablet, , Disp: , Rfl:     vitamin E, tocopherol, 1,000 units capsule, Take 1,000 Units by mouth daily, Disp: , Rfl:     benzonatate (TESSALON PERLES) 100 mg capsule, Take 1 capsule (100 mg total) by mouth 3 (three) times a day (Patient not taking: Reported on 2024), Disp: 20 capsule, Rfl: 0  Allergies   Allergen Reactions    Alcohol - Food Allergy Hives       Vitals: Blood pressure 108/60, pulse 73, temperature 98.7 °F (37.1 °C), temperature source Tympanic, SpO2 96%., There is no height or weight on file to calculate BMI. Oxygen Therapy  SpO2: 96 %  Oxygen Therapy: None (Room air)    Physical Exam   Physical Exam  Vitals reviewed.   Constitutional:       General: She is not in acute distress.     Appearance: She is not ill-appearing or toxic-appearing.   HENT:      Head: Normocephalic.   Eyes:      General: No scleral icterus.     Pupils: Pupils are equal, round, and reactive to  "light.   Cardiovascular:      Rate and Rhythm: Normal rate and regular rhythm.      Heart sounds: No murmur heard.     No gallop.   Pulmonary:      Effort: Pulmonary effort is normal. No respiratory distress.      Breath sounds: Normal breath sounds. No wheezing, rhonchi or rales.   Abdominal:      General: Bowel sounds are normal.      Palpations: Abdomen is soft.   Musculoskeletal:      Cervical back: Normal range of motion.      Right lower leg: No edema.      Left lower leg: No edema.   Skin:     General: Skin is warm.      Capillary Refill: Capillary refill takes less than 2 seconds.   Neurological:      Mental Status: She is alert and oriented to person, place, and time. Mental status is at baseline.      Cranial Nerves: No cranial nerve deficit.   Psychiatric:         Mood and Affect: Mood normal.           Labs: I have personally reviewed pertinent lab results.  Lab Results   Component Value Date    WBC 22.86 (H) 01/08/2024    HGB 13.3 01/08/2024    HCT 40.7 01/08/2024    MCV 91 01/08/2024     01/08/2024     Lab Results   Component Value Date    GLUCOSE 154 (H) 01/02/2024    CALCIUM 9.0 01/08/2024    K 4.9 01/08/2024    CO2 28 01/08/2024    CL 96 01/08/2024    BUN 50 (H) 01/08/2024    CREATININE 1.25 01/08/2024     No results found for: \"IGE\"  Lab Results   Component Value Date    ALT 48 01/06/2024    AST 19 01/06/2024    ALKPHOS 54 01/06/2024     Imaging and other studies: I have personally reviewed pertinent reports.   and I have personally reviewed pertinent films in PACS    CTA PE study 01/01/2024: No evidence of PE. Exam is limited by motion, however there is no evidence of emphysema and there is no significant consolidation. No pleural effusions.     Pulmonary function testing 01/24/24:   POCT spirometry with FEV1/FVC ratio 73% (LLN 60%. FVC76% predicted, FEV1 74% predicted, consistent with normal spirometry.    EKG, Pathology, and Other Studies: I have personally reviewed pertinent reports.  " "    Disclaimer: Portions of the record may have been created with voice recognition software. Occasional wrong word or \"sound a like\" substitutions may have occurred due to the inherent limitations of voice recognition software. Careful consideration should be taken to recognize, using context, where substitutions have occurred.    Sarah Robles MD  Pulmonary and Critical Care Fellow, PGY-5  [facility]    "

## 2024-01-25 ENCOUNTER — OFFICE VISIT (OUTPATIENT)
Dept: FAMILY MEDICINE CLINIC | Facility: CLINIC | Age: 89
End: 2024-01-25
Payer: MEDICARE

## 2024-01-25 VITALS
OXYGEN SATURATION: 94 % | HEART RATE: 76 BPM | RESPIRATION RATE: 16 BRPM | BODY MASS INDEX: 33.47 KG/M2 | DIASTOLIC BLOOD PRESSURE: 60 MMHG | HEIGHT: 63 IN | TEMPERATURE: 97.9 F | SYSTOLIC BLOOD PRESSURE: 110 MMHG | WEIGHT: 188.9 LBS

## 2024-01-25 DIAGNOSIS — Z91.89 DRIVING SAFETY ISSUE: ICD-10-CM

## 2024-01-25 DIAGNOSIS — I16.1 HYPERTENSIVE EMERGENCY: ICD-10-CM

## 2024-01-25 DIAGNOSIS — N18.32 CKD STAGE G3B/A1, GFR 30-44 AND ALBUMIN CREATININE RATIO <30 MG/G (HCC): ICD-10-CM

## 2024-01-25 DIAGNOSIS — I71.43 INFRARENAL ABDOMINAL AORTIC ANEURYSM (AAA) WITHOUT RUPTURE (HCC): ICD-10-CM

## 2024-01-25 DIAGNOSIS — E78.2 MIXED HYPERLIPIDEMIA: ICD-10-CM

## 2024-01-25 DIAGNOSIS — Z00.00 ENCOUNTER FOR ANNUAL WELLNESS VISIT (AWV) IN MEDICARE PATIENT: Primary | ICD-10-CM

## 2024-01-25 DIAGNOSIS — F17.200 TOBACCO USE DISORDER: ICD-10-CM

## 2024-01-25 DIAGNOSIS — Z23 ENCOUNTER FOR IMMUNIZATION: ICD-10-CM

## 2024-01-25 DIAGNOSIS — R05.3 CHRONIC COUGH: ICD-10-CM

## 2024-01-25 DIAGNOSIS — R31.9 HEMATURIA, UNSPECIFIED TYPE: ICD-10-CM

## 2024-01-25 PROBLEM — E87.1 HYPONATREMIA: Status: RESOLVED | Noted: 2024-01-01 | Resolved: 2024-01-25

## 2024-01-25 PROCEDURE — 99214 OFFICE O/P EST MOD 30 MIN: CPT | Performed by: INTERNAL MEDICINE

## 2024-01-25 PROCEDURE — G0009 ADMIN PNEUMOCOCCAL VACCINE: HCPCS

## 2024-01-25 PROCEDURE — G0439 PPPS, SUBSEQ VISIT: HCPCS | Performed by: INTERNAL MEDICINE

## 2024-01-25 PROCEDURE — 90677 PCV20 VACCINE IM: CPT

## 2024-01-25 NOTE — PROGRESS NOTES
Assessment and Plan:     Problem List Items Addressed This Visit     CKD stage G3b/A1, GFR 30-44 and albumin creatinine ratio <30 mg/g (HCC)    Relevant Orders    CBC and differential    Comprehensive metabolic panel    Urinalysis with microscopic    Hypertensive emergency    Relevant Orders    CBC and differential    Comprehensive metabolic panel    Hematuria    Relevant Orders    Urinalysis with microscopic    Mixed hyperlipidemia    Relevant Orders    Lipid Panel with Direct LDL reflex    Tobacco use disorder    Chronic cough    Relevant Orders    CBC and differential    Comprehensive metabolic panel   Other Visit Diagnoses     Encounter for annual wellness visit (AWV) in Medicare patient    -  Primary    Infrarenal abdominal aortic aneurysm (AAA) without rupture (HCC)        Relevant Orders    US abdominal aorta screening aaa    CBC and differential    Comprehensive metabolic panel    Encounter for immunization        Relevant Orders    Pneumococcal Conjugate Vaccine 20-valent (Pcv20) (Completed)    Driving safety issue              Depression Screening and Follow-up Plan: Patient was screened for depression during today's encounter. They screened negative with a PHQ-9 score of 0.    Urinary Incontinence Plan of Care: counseling topics discussed: use restroom every 2 hours and preventing constipation.       Preventive health issues were discussed with patient, and age appropriate screening tests were ordered as noted in patient's After Visit Summary.  Personalized health advice and appropriate referrals for health education or preventive services given if needed, as noted in patient's After Visit Summary.       History of Present Illness:     Patient presents for a Medicare Wellness Visit    89yo female here to establish care. Recently admitted for severe sepsis, acute respiratory failure requiring intubation and mechanical ventilation, here to establish care. Here with her daughters Dorita and Cheri    Reports  she had a cold around ivan that progressed until 01/01 when she appeared very short of breath and weak. Daughters took her to ER and admitted with severe sepsis, acute respiratory failure requiring intubation. Treated with IV cefepime, steroids and discharged on prednisone taper. Quit smoking day before she was admitted. Saw Pulmonology since discharge and in-office PFTs were normal.     Reports chronic cough for years, but no history of asthma or COPD. Cough resolved since discharge. Smoked since age 18yo. Quit during 2 pregnancies and one period of 10 years. Smoked about 0.5 ppd.     Lives alone and fully independent with ADLs and IADLs. Still drives but hasn't driven since discharge. Getting home health PT/OT.        Patient Care Team:  Gloria Hall MD as PCP - General (Internal Medicine)     Review of Systems:     Review of Systems   Constitutional:  Negative for chills, fatigue, fever and unexpected weight change.   HENT:  Negative for congestion, postnasal drip, rhinorrhea, sore throat and trouble swallowing.    Eyes:  Negative for pain, redness, itching and visual disturbance.   Respiratory:  Positive for cough. Negative for chest tightness, shortness of breath and wheezing.    Cardiovascular:  Negative for chest pain, palpitations and leg swelling.   Gastrointestinal:  Negative for abdominal pain, blood in stool, constipation, diarrhea, nausea and vomiting.   Endocrine: Negative for cold intolerance, heat intolerance, polydipsia and polyuria.   Genitourinary:  Negative for dysuria, frequency, hematuria and urgency.   Musculoskeletal:  Negative for arthralgias, back pain and joint swelling.   Skin:  Negative for rash.   Neurological:  Negative for dizziness, tremors, weakness, light-headedness, numbness and headaches.   Psychiatric/Behavioral:  Negative for confusion, dysphoric mood, hallucinations and suicidal ideas. The patient is not nervous/anxious.         Problem List:     Patient Active  Problem List   Diagnosis   • CKD stage G3b/A1, GFR 30-44 and albumin creatinine ratio <30 mg/g (HCC)   • Hypertensive emergency   • Hematuria   • Intrinsic eczema   • Mixed hyperlipidemia   • Tobacco use disorder   • Dysphagia   • Cigarette nicotine dependence in remission   • Chronic cough      Past Medical and Surgical History:     Past Medical History:   Diagnosis Date   • Acute respiratory failure with hypoxia and hypercapnia (HCC)    • Elevated d-dimer    • Hyperlipidemia    • Hypertension    • Severe sepsis (HCC)      Past Surgical History:   Procedure Laterality Date   • HYSTERECTOMY        Family History:     History reviewed. No pertinent family history.   Social History:     Social History     Socioeconomic History   • Marital status:      Spouse name: None   • Number of children: None   • Years of education: None   • Highest education level: None   Occupational History   • None   Tobacco Use   • Smoking status: Former     Current packs/day: 0.00     Average packs/day: 0.5 packs/day for 76.0 years (38.0 ttl pk-yrs)     Types: Cigarettes     Start date:      Quit date: 2024     Years since quittin.0   • Smokeless tobacco: Never   Vaping Use   • Vaping status: Never Used   Substance and Sexual Activity   • Alcohol use: Not Currently   • Drug use: Never   • Sexual activity: Not Currently   Other Topics Concern   • None   Social History Narrative   • None     Social Determinants of Health     Financial Resource Strain: Not on file   Food Insecurity: No Food Insecurity (2024)    Hunger Vital Sign    • Worried About Running Out of Food in the Last Year: Never true    • Ran Out of Food in the Last Year: Never true   Transportation Needs: No Transportation Needs (2024)    PRAPARE - Transportation    • Lack of Transportation (Medical): No    • Lack of Transportation (Non-Medical): No   Physical Activity: Not on file   Stress: Not on file   Social Connections: Not on file   Intimate  Partner Violence: Not on file   Housing Stability: Low Risk  (1/5/2024)    Housing Stability Vital Sign    • Unable to Pay for Housing in the Last Year: No    • Number of Places Lived in the Last Year: 1    • Unstable Housing in the Last Year: No      Medications and Allergies:     Current Outpatient Medications   Medication Sig Dispense Refill   • calcium citrate-vitamin D (CITRACAL+D) 315-200 MG-UNIT per tablet Take 1 tablet by mouth 2 (two) times a day     • lisinopril-hydrochlorothiazide (PRINZIDE,ZESTORETIC) 20-12.5 MG per tablet      • Multiple Vitamin (MULTIVITAMIN) tablet Take 1 tablet by mouth daily     • Omega-3 Fatty Acids (FISH OIL) 1,000 mg Take 1,000 mg by mouth daily     • simvastatin (ZOCOR) 40 mg tablet      • vitamin E, tocopherol, 1,000 units capsule Take 1,000 Units by mouth daily       No current facility-administered medications for this visit.     Allergies   Allergen Reactions   • Alcohol - Food Allergy Hives      Immunizations:     Immunization History   Administered Date(s) Administered   • COVID-19 PFIZER VACCINE 0.3 ML IM 03/15/2021, 04/08/2021, 01/26/2022   • COVID-19 Pfizer Vac BIVALENT Rashawn-sucrose 12 Yr+ IM 02/17/2023   • Pneumococcal Conjugate Vaccine 20-valent (Pcv20), Polysace 01/25/2024      Health Maintenance:     There are no preventive care reminders to display for this patient.      Topic Date Due   • Hepatitis A Vaccine (1 of 2 - Risk 2-dose series) Never done   • COVID-19 Vaccine (5 - 2023-24 season) 09/01/2023      Medicare Screening Tests and Risk Assessments:     Dionne is here for her Subsequent Wellness visit.     Health Risk Assessment:   Patient rates overall health as good. Patient feels that their physical health rating is same. Patient is satisfied with their life. Eyesight was rated as same. Hearing was rated as same. Patient feels that their emotional and mental health rating is same. Patients states they are never, rarely angry. Patient states they are often  unusually tired/fatigued. Pain experienced in the last 7 days has been none. Patient states that she has experienced no weight loss or gain in last 6 months.     Depression Screening:   PHQ-9 Score: 0      Fall Risk Screening:   In the past year, patient has experienced: no history of falling in past year      Urinary Incontinence Screening:   Patient has leaked urine accidently in the last six months.     Home Safety:  Patient does not have trouble with stairs inside or outside of their home. Patient has working smoke alarms and has no working carbon monoxide detector. Home safety hazards include: none.     Nutrition:   Current diet is Regular.     Medications:   Patient is currently taking over-the-counter supplements. OTC medications include: see medication list. Patient is able to manage medications.     Activities of Daily Living (ADLs)/Instrumental Activities of Daily Living (IADLs):   Walk and transfer into and out of bed and chair?: Yes  Dress and groom yourself?: Yes    Bathe or shower yourself?: Yes    Feed yourself? Yes  Do your laundry/housekeeping?: Yes  Manage your money, pay your bills and track your expenses?: Yes  Make your own meals?: Yes    Do your own shopping?: Yes    Previous Hospitalizations:   Any hospitalizations or ED visits within the last 12 months?: Yes    How many hospitalizations have you had in the last year?: 1-2    Hospitalization Comments: Recently hospitalized for respiratory failure, pneumonia and sepsis     Advance Care Planning:   Living will: Yes    Durable POA for healthcare: Yes    Advanced directive: Yes    Advanced directive counseling given: Yes      Comments: Daughter Sabra nguyễn POA  Has living will, but not sure what is says    Cognitive Screening:   Provider or family/friend/caregiver concerned regarding cognition?: No    PREVENTIVE SCREENINGS      Cardiovascular Screening:    General: Screening Not Indicated, History Lipid Disorder and Screening Current      Diabetes  "Screening:     General: Screening Not Indicated, History Diabetes and Screening Current      Colorectal Cancer Screening:     General: Screening Not Indicated      Breast Cancer Screening:     General: Screening Not Indicated      Cervical Cancer Screening:    General: Screening Not Indicated      Osteoporosis Screening:    General: Screening Not Indicated      Abdominal Aortic Aneurysm (AAA) Screening:        General: Screening Not Indicated and History AAA    Due for: Screening AAA Ultrasound      Lung Cancer Screening:     General: Screening Not Indicated and Screening Current      Hepatitis C Screening:    General: Screening Not Indicated    Screening, Brief Intervention, and Referral to Treatment (SBIRT)    Screening  Typical number of drinks in a day: 0  Typical number of drinks in a week: 0  Interpretation: Low risk drinking behavior.    Single Item Drug Screening:  How often have you used an illegal drug (including marijuana) or a prescription medication for non-medical reasons in the past year? never    Single Item Drug Screen Score: 0  Interpretation: Negative screen for possible drug use disorder    Brief Intervention  Alcohol & drug use screenings were reviewed. No concerns regarding substance use disorder identified.     Other Counseling Topics:   Car/seat belt/driving safety, skin self-exam, sunscreen and calcium and vitamin D intake and regular weightbearing exercise. Discussed driving safety, referral to [facility] [place]     No results found.     Physical Exam:     /60   Pulse 76   Temp 97.9 °F (36.6 °C) (Tympanic)   Resp 16   Ht 5' 3\" (1.6 m)   Wt 85.7 kg (188 lb 14.4 oz)   SpO2 94%   BMI 33.46 kg/m²     Physical Exam  Vitals reviewed.   Constitutional:       General: She is not in acute distress.     Appearance: She is normal weight.   HENT:      Head: Normocephalic and atraumatic.      Right Ear: Tympanic membrane, ear canal and external ear normal.      Left Ear: Tympanic " membrane, ear canal and external ear normal.      Mouth/Throat:      Pharynx: No oropharyngeal exudate or posterior oropharyngeal erythema.   Cardiovascular:      Rate and Rhythm: Normal rate and regular rhythm.      Heart sounds: No murmur heard.     No friction rub. No gallop.   Pulmonary:      Effort: Pulmonary effort is normal. No respiratory distress.      Breath sounds: No wheezing, rhonchi or rales.   Abdominal:      General: Bowel sounds are normal. There is distension.      Palpations: Abdomen is soft.      Tenderness: There is no abdominal tenderness. There is no guarding or rebound.   Lymphadenopathy:      Cervical: No cervical adenopathy.   Neurological:      Mental Status: She is alert.      Motor: Motor function is intact.      Gait: Gait is intact.   Psychiatric:         Mood and Affect: Mood and affect normal.         Speech: Speech normal.         Behavior: Behavior normal. Behavior is cooperative.          Gloria Hall MD

## 2024-01-25 NOTE — PATIENT INSTRUCTIONS
Medicare Preventive Visit Patient Instructions  Thank you for completing your Welcome to Medicare Visit or Medicare Annual Wellness Visit today. Your next wellness visit will be due in one year (1/25/2025).  The screening/preventive services that you may require over the next 5-10 years are detailed below. Some tests may not apply to you based off risk factors and/or age. Screening tests ordered at today's visit but not completed yet may show as past due. Also, please note that scanned in results may not display below.  Preventive Screenings:  Service Recommendations Previous Testing/Comments   Colorectal Cancer Screening  * Colonoscopy    * Fecal Occult Blood Test (FOBT)/Fecal Immunochemical Test (FIT)  * Fecal DNA/Cologuard Test  * Flexible Sigmoidoscopy Age: 45-75 years old   Colonoscopy: every 10 years (may be performed more frequently if at higher risk)  OR  FOBT/FIT: every 1 year  OR  Cologuard: every 3 years  OR  Sigmoidoscopy: every 5 years  Screening may be recommended earlier than age 45 if at higher risk for colorectal cancer. Also, an individualized decision between you and your healthcare provider will decide whether screening between the ages of 76-85 would be appropriate. Colonoscopy: Not on file  FOBT/FIT: Not on file  Cologuard: Not on file  Sigmoidoscopy: Not on file    Screening Not Indicated     Breast Cancer Screening Age: 40+ years old  Frequency: every 1-2 years  Not required if history of left and right mastectomy Mammogram: 09/25/2020        Cervical Cancer Screening Between the ages of 21-29, pap smear recommended once every 3 years.   Between the ages of 30-65, can perform pap smear with HPV co-testing every 5 years.   Recommendations may differ for women with a history of total hysterectomy, cervical cancer, or abnormal pap smears in past. Pap Smear: Not on file    Screening Not Indicated   Hepatitis C Screening Once for adults born between 1945 and 1965  More frequently in patients at  high risk for Hepatitis C Hep C Antibody: Not on file        Diabetes Screening 1-2 times per year if you're at risk for diabetes or have pre-diabetes Fasting glucose: No results in last 5 years (No results in last 5 years)  A1C: 6.8 % (1/1/2024)  Screening Not Indicated  History Diabetes   Cholesterol Screening Once every 5 years if you don't have a lipid disorder. May order more often based on risk factors. Lipid panel: 08/14/2023    Screening Not Indicated  History Lipid Disorder     Other Preventive Screenings Covered by Medicare:  Abdominal Aortic Aneurysm (AAA) Screening: covered once if your at risk. You're considered to be at risk if you have a family history of AAA.  Lung Cancer Screening: covers low dose CT scan once per year if you meet all of the following conditions: (1) Age 55-77; (2) No signs or symptoms of lung cancer; (3) Current smoker or have quit smoking within the last 15 years; (4) You have a tobacco smoking history of at least 20 pack years (packs per day multiplied by number of years you smoked); (5) You get a written order from a healthcare provider.  Glaucoma Screening: covered annually if you're considered high risk: (1) You have diabetes OR (2) Family history of glaucoma OR (3)  aged 50 and older OR (4)  American aged 65 and older  Osteoporosis Screening: covered every 2 years if you meet one of the following conditions: (1) You're estrogen deficient and at risk for osteoporosis based off medical history and other findings; (2) Have a vertebral abnormality; (3) On glucocorticoid therapy for more than 3 months; (4) Have primary hyperparathyroidism; (5) On osteoporosis medications and need to assess response to drug therapy.   Last bone density test (DXA Scan): Not on file.  HIV Screening: covered annually if you're between the age of 15-65. Also covered annually if you are younger than 15 and older than 65 with risk factors for HIV infection. For pregnant patients,  it is covered up to 3 times per pregnancy.    Immunizations:  Immunization Recommendations   Influenza Vaccine Annual influenza vaccination during flu season is recommended for all persons aged >= 6 months who do not have contraindications   Pneumococcal Vaccine   * Pneumococcal conjugate vaccine = PCV13 (Prevnar 13), PCV15 (Vaxneuvance), PCV20 (Prevnar 20)  * Pneumococcal polysaccharide vaccine = PPSV23 (Pneumovax) Adults 19-65 yo with certain risk factors or if 65+ yo  If never received any pneumonia vaccine: recommend Prevnar 20 (PCV20)  Give PCV20 if previously received 1 dose of PCV13 or PPSV23   Hepatitis B Vaccine 3 dose series if at intermediate or high risk (ex: diabetes, end stage renal disease, liver disease)   Respiratory syncytial virus (RSV) Vaccine - COVERED BY MEDICARE PART D  * RSVPreF3 (Arexvy) CDC recommends that adults 60 years of age and older may receive a single dose of RSV vaccine using shared clinical decision-making (SCDM)   Tetanus (Td) Vaccine - COST NOT COVERED BY MEDICARE PART B Following completion of primary series, a booster dose should be given every 10 years to maintain immunity against tetanus. Td may also be given as tetanus wound prophylaxis.   Tdap Vaccine - COST NOT COVERED BY MEDICARE PART B Recommended at least once for all adults. For pregnant patients, recommended with each pregnancy.   Shingles Vaccine (Shingrix) - COST NOT COVERED BY MEDICARE PART B  2 shot series recommended in those 19 years and older who have or will have weakened immune systems or those 50 years and older     Health Maintenance Due:  There are no preventive care reminders to display for this patient.  Immunizations Due:      Topic Date Due   • Hepatitis A Vaccine (1 of 2 - Risk 2-dose series) Never done   • COVID-19 Vaccine (5 - 2023-24 season) 09/01/2023     Advance Directives   What are advance directives?  Advance directives are legal documents that state your wishes and plans for medical care.  These plans are made ahead of time in case you lose your ability to make decisions for yourself. Advance directives can apply to any medical decision, such as the treatments you want, and if you want to donate organs.   What are the types of advance directives?  There are many types of advance directives, and each state has rules about how to use them. You may choose a combination of any of the following:  Living will:  This is a written record of the treatment you want. You can also choose which treatments you do not want, which to limit, and which to stop at a certain time. This includes surgery, medicine, IV fluid, and tube feedings.   Durable power of  for healthcare (DPAHC):  This is a written record that states who you want to make healthcare choices for you when you are unable to make them for yourself. This person, called a proxy, is usually a family member or a friend. You may choose more than 1 proxy.  Do not resuscitate (DNR) order:  A DNR order is used in case your heart stops beating or you stop breathing. It is a request not to have certain forms of treatment, such as CPR. A DNR order may be included in other types of advance directives.  Medical directive:  This covers the care that you want if you are in a coma, near death, or unable to make decisions for yourself. You can list the treatments you want for each condition. Treatment may include pain medicine, surgery, blood transfusions, dialysis, IV or tube feedings, and a ventilator (breathing machine).  Values history:  This document has questions about your views, beliefs, and how you feel and think about life. This information can help others choose the care that you would choose.  Why are advance directives important?  An advance directive helps you control your care. Although spoken wishes may be used, it is better to have your wishes written down. Spoken wishes can be misunderstood, or not followed. Treatments may be given even if you  do not want them. An advance directive may make it easier for your family to make difficult choices about your care.   Urinary Incontinence   Urinary incontinence (UI)  is when you lose control of your bladder. UI develops because your bladder cannot store or empty urine properly. The 3 most common types of UI are stress incontinence, urge incontinence, or both.  Medicines:   May be given to help strengthen your bladder control. Report any side effects of medication to your healthcare provider.  Do pelvic muscle exercises often:  Your pelvic muscles help you stop urinating. Squeeze these muscles tight for 5 seconds, then relax for 5 seconds. Gradually work up to squeezing for 10 seconds. Do 3 sets of 15 repetitions a day, or as directed. This will help strengthen your pelvic muscles and improve bladder control.  Train your bladder:  Go to the bathroom at set times, such as every 2 hours, even if you do not feel the urge to go. You can also try to hold your urine when you feel the urge to go. For example, hold your urine for 5 minutes when you feel the urge to go. As that becomes easier, hold your urine for 10 minutes.   Self-care:   Keep a UI record.  Write down how often you leak urine and how much you leak. Make a note of what you were doing when you leaked urine.  Drink liquids as directed. You may need to limit the amount of liquid you drink to help control your urine leakage. Do not drink any liquid right before you go to bed. Limit or do not have drinks that contain caffeine or alcohol.   Prevent constipation.  Eat a variety of high-fiber foods. Good examples are high-fiber cereals, beans, vegetables, and whole-grain breads. Walking is the best way to trigger your intestines to have a bowel movement.  Exercise regularly and maintain a healthy weight.  Weight loss and exercise will decrease pressure on your bladder and help you control your leakage.   Use a catheter as directed  to help empty your bladder. A  catheter is a tiny, plastic tube that is put into your bladder to drain your urine.   Go to behavior therapy as directed.  Behavior therapy may be used to help you learn to control your urge to urinate.    Weight Management   Why it is important to manage your weight:  Being overweight increases your risk of health conditions such as heart disease, high blood pressure, type 2 diabetes, and certain types of cancer. It can also increase your risk for osteoarthritis, sleep apnea, and other respiratory problems. Aim for a slow, steady weight loss. Even a small amount of weight loss can lower your risk of health problems.  How to lose weight safely:  A safe and healthy way to lose weight is to eat fewer calories and get regular exercise. You can lose up about 1 pound a week by decreasing the number of calories you eat by 500 calories each day.   Healthy meal plan for weight management:  A healthy meal plan includes a variety of foods, contains fewer calories, and helps you stay healthy. A healthy meal plan includes the following:  Eat whole-grain foods more often.  A healthy meal plan should contain fiber. Fiber is the part of grains, fruits, and vegetables that is not broken down by your body. Whole-grain foods are healthy and provide extra fiber in your diet. Some examples of whole-grain foods are whole-wheat breads and pastas, oatmeal, brown rice, and bulgur.  Eat a variety of vegetables every day.  Include dark, leafy greens such as spinach, kale, hipolito greens, and mustard greens. Eat yellow and orange vegetables such as carrots, sweet potatoes, and winter squash.   Eat a variety of fruits every day.  Choose fresh or canned fruit (canned in its own juice or light syrup) instead of juice. Fruit juice has very little or no fiber.  Eat low-fat dairy foods.  Drink fat-free (skim) milk or 1% milk. Eat fat-free yogurt and low-fat cottage cheese. Try low-fat cheeses such as mozzarella and other reduced-fat  cheeses.  Choose meat and other protein foods that are low in fat.  Choose beans or other legumes such as split peas or lentils. Choose fish, skinless poultry (chicken or turkey), or lean cuts of red meat (beef or pork). Before you cook meat or poultry, cut off any visible fat.   Use less fat and oil.  Try baking foods instead of frying them. Add less fat, such as margarine, sour cream, regular salad dressing and mayonnaise to foods. Eat fewer high-fat foods. Some examples of high-fat foods include french fries, doughnuts, ice cream, and cakes.  Eat fewer sweets.  Limit foods and drinks that are high in sugar. This includes candy, cookies, regular soda, and sweetened drinks.  Exercise:  Exercise at least 30 minutes per day on most days of the week. Some examples of exercise include walking, biking, dancing, and swimming. You can also fit in more physical activity by taking the stairs instead of the elevator or parking farther away from stores. Ask your healthcare provider about the best exercise plan for you.      © Copyright [employer] 2018 Information is for End User's use only and may not be sold, redistributed or otherwise used for commercial purposes. All illustrations and images included in CareNotes® are the copyrighted property of [employer] or [employer]      Medicare Preventive Visit Patient Instructions  Thank you for completing your Welcome to Medicare Visit or Medicare Annual Wellness Visit today. Your next wellness visit will be due in one year (1/25/2025).  The screening/preventive services that you may require over the next 5-10 years are detailed below. Some tests may not apply to you based off risk factors and/or age. Screening tests ordered at today's visit but not completed yet may show as past due. Also, please note that scanned in results may not display below.  Preventive Screenings:  Service Recommendations Previous Testing/Comments   Colorectal Cancer Screening  * Colonoscopy     * Fecal Occult Blood Test (FOBT)/Fecal Immunochemical Test (FIT)  * Fecal DNA/Cologuard Test  * Flexible Sigmoidoscopy Age: 45-75 years old   Colonoscopy: every 10 years (may be performed more frequently if at higher risk)  OR  FOBT/FIT: every 1 year  OR  Cologuard: every 3 years  OR  Sigmoidoscopy: every 5 years  Screening may be recommended earlier than age 45 if at higher risk for colorectal cancer. Also, an individualized decision between you and your healthcare provider will decide whether screening between the ages of 76-85 would be appropriate. Colonoscopy: Not on file  FOBT/FIT: Not on file  Cologuard: Not on file  Sigmoidoscopy: Not on file    Screening Not Indicated     Breast Cancer Screening Age: 40+ years old  Frequency: every 1-2 years  Not required if history of left and right mastectomy Mammogram: 09/25/2020        Cervical Cancer Screening Between the ages of 21-29, pap smear recommended once every 3 years.   Between the ages of 30-65, can perform pap smear with HPV co-testing every 5 years.   Recommendations may differ for women with a history of total hysterectomy, cervical cancer, or abnormal pap smears in past. Pap Smear: Not on file    Screening Not Indicated   Hepatitis C Screening Once for adults born between 1945 and 1965  More frequently in patients at high risk for Hepatitis C Hep C Antibody: Not on file        Diabetes Screening 1-2 times per year if you're at risk for diabetes or have pre-diabetes Fasting glucose: No results in last 5 years (No results in last 5 years)  A1C: 6.8 % (1/1/2024)  Screening Not Indicated  History Diabetes   Cholesterol Screening Once every 5 years if you don't have a lipid disorder. May order more often based on risk factors. Lipid panel: 08/14/2023    Screening Not Indicated  History Lipid Disorder     Other Preventive Screenings Covered by Medicare:  Abdominal Aortic Aneurysm (AAA) Screening: covered once if your at risk. You're considered to be at risk  if you have a family history of AAA.  Lung Cancer Screening: covers low dose CT scan once per year if you meet all of the following conditions: (1) Age 55-77; (2) No signs or symptoms of lung cancer; (3) Current smoker or have quit smoking within the last 15 years; (4) You have a tobacco smoking history of at least 20 pack years (packs per day multiplied by number of years you smoked); (5) You get a written order from a healthcare provider.  Glaucoma Screening: covered annually if you're considered high risk: (1) You have diabetes OR (2) Family history of glaucoma OR (3)  aged 50 and older OR (4)  American aged 65 and older  Osteoporosis Screening: covered every 2 years if you meet one of the following conditions: (1) You're estrogen deficient and at risk for osteoporosis based off medical history and other findings; (2) Have a vertebral abnormality; (3) On glucocorticoid therapy for more than 3 months; (4) Have primary hyperparathyroidism; (5) On osteoporosis medications and need to assess response to drug therapy.   Last bone density test (DXA Scan): Not on file.  HIV Screening: covered annually if you're between the age of 15-65. Also covered annually if you are younger than 15 and older than 65 with risk factors for HIV infection. For pregnant patients, it is covered up to 3 times per pregnancy.    Immunizations:  Immunization Recommendations   Influenza Vaccine Annual influenza vaccination during flu season is recommended for all persons aged >= 6 months who do not have contraindications   Pneumococcal Vaccine   * Pneumococcal conjugate vaccine = PCV13 (Prevnar 13), PCV15 (Vaxneuvance), PCV20 (Prevnar 20)  * Pneumococcal polysaccharide vaccine = PPSV23 (Pneumovax) Adults 19-63 yo with certain risk factors or if 65+ yo  If never received any pneumonia vaccine: recommend Prevnar 20 (PCV20)  Give PCV20 if previously received 1 dose of PCV13 or PPSV23   Hepatitis B Vaccine 3 dose series if  at intermediate or high risk (ex: diabetes, end stage renal disease, liver disease)   Respiratory syncytial virus (RSV) Vaccine - COVERED BY MEDICARE PART D  * RSVPreF3 (Arexvy) CDC recommends that adults 60 years of age and older may receive a single dose of RSV vaccine using shared clinical decision-making (SCDM)   Tetanus (Td) Vaccine - COST NOT COVERED BY MEDICARE PART B Following completion of primary series, a booster dose should be given every 10 years to maintain immunity against tetanus. Td may also be given as tetanus wound prophylaxis.   Tdap Vaccine - COST NOT COVERED BY MEDICARE PART B Recommended at least once for all adults. For pregnant patients, recommended with each pregnancy.   Shingles Vaccine (Shingrix) - COST NOT COVERED BY MEDICARE PART B  2 shot series recommended in those 19 years and older who have or will have weakened immune systems or those 50 years and older     Health Maintenance Due:  There are no preventive care reminders to display for this patient.  Immunizations Due:      Topic Date Due   • Hepatitis A Vaccine (1 of 2 - Risk 2-dose series) Never done   • COVID-19 Vaccine (5 - 2023-24 season) 09/01/2023     Advance Directives   What are advance directives?  Advance directives are legal documents that state your wishes and plans for medical care. These plans are made ahead of time in case you lose your ability to make decisions for yourself. Advance directives can apply to any medical decision, such as the treatments you want, and if you want to donate organs.   What are the types of advance directives?  There are many types of advance directives, and each state has rules about how to use them. You may choose a combination of any of the following:  Living will:  This is a written record of the treatment you want. You can also choose which treatments you do not want, which to limit, and which to stop at a certain time. This includes surgery, medicine, IV fluid, and tube feedings.    Durable power of  for healthcare (DPA):  This is a written record that states who you want to make healthcare choices for you when you are unable to make them for yourself. This person, called a proxy, is usually a family member or a friend. You may choose more than 1 proxy.  Do not resuscitate (DNR) order:  A DNR order is used in case your heart stops beating or you stop breathing. It is a request not to have certain forms of treatment, such as CPR. A DNR order may be included in other types of advance directives.  Medical directive:  This covers the care that you want if you are in a coma, near death, or unable to make decisions for yourself. You can list the treatments you want for each condition. Treatment may include pain medicine, surgery, blood transfusions, dialysis, IV or tube feedings, and a ventilator (breathing machine).  Values history:  This document has questions about your views, beliefs, and how you feel and think about life. This information can help others choose the care that you would choose.  Why are advance directives important?  An advance directive helps you control your care. Although spoken wishes may be used, it is better to have your wishes written down. Spoken wishes can be misunderstood, or not followed. Treatments may be given even if you do not want them. An advance directive may make it easier for your family to make difficult choices about your care.   Urinary Incontinence   Urinary incontinence (UI)  is when you lose control of your bladder. UI develops because your bladder cannot store or empty urine properly. The 3 most common types of UI are stress incontinence, urge incontinence, or both.  Medicines:   May be given to help strengthen your bladder control. Report any side effects of medication to your healthcare provider.  Do pelvic muscle exercises often:  Your pelvic muscles help you stop urinating. Squeeze these muscles tight for 5 seconds, then relax for 5  seconds. Gradually work up to squeezing for 10 seconds. Do 3 sets of 15 repetitions a day, or as directed. This will help strengthen your pelvic muscles and improve bladder control.  Train your bladder:  Go to the bathroom at set times, such as every 2 hours, even if you do not feel the urge to go. You can also try to hold your urine when you feel the urge to go. For example, hold your urine for 5 minutes when you feel the urge to go. As that becomes easier, hold your urine for 10 minutes.   Self-care:   Keep a UI record.  Write down how often you leak urine and how much you leak. Make a note of what you were doing when you leaked urine.  Drink liquids as directed. You may need to limit the amount of liquid you drink to help control your urine leakage. Do not drink any liquid right before you go to bed. Limit or do not have drinks that contain caffeine or alcohol.   Prevent constipation.  Eat a variety of high-fiber foods. Good examples are high-fiber cereals, beans, vegetables, and whole-grain breads. Walking is the best way to trigger your intestines to have a bowel movement.  Exercise regularly and maintain a healthy weight.  Weight loss and exercise will decrease pressure on your bladder and help you control your leakage.   Use a catheter as directed  to help empty your bladder. A catheter is a tiny, plastic tube that is put into your bladder to drain your urine.   Go to behavior therapy as directed.  Behavior therapy may be used to help you learn to control your urge to urinate.    Weight Management   Why it is important to manage your weight:  Being overweight increases your risk of health conditions such as heart disease, high blood pressure, type 2 diabetes, and certain types of cancer. It can also increase your risk for osteoarthritis, sleep apnea, and other respiratory problems. Aim for a slow, steady weight loss. Even a small amount of weight loss can lower your risk of health problems.  How to lose  weight safely:  A safe and healthy way to lose weight is to eat fewer calories and get regular exercise. You can lose up about 1 pound a week by decreasing the number of calories you eat by 500 calories each day.   Healthy meal plan for weight management:  A healthy meal plan includes a variety of foods, contains fewer calories, and helps you stay healthy. A healthy meal plan includes the following:  Eat whole-grain foods more often.  A healthy meal plan should contain fiber. Fiber is the part of grains, fruits, and vegetables that is not broken down by your body. Whole-grain foods are healthy and provide extra fiber in your diet. Some examples of whole-grain foods are whole-wheat breads and pastas, oatmeal, brown rice, and bulgur.  Eat a variety of vegetables every day.  Include dark, leafy greens such as spinach, kale, hipolito greens, and mustard greens. Eat yellow and orange vegetables such as carrots, sweet potatoes, and winter squash.   Eat a variety of fruits every day.  Choose fresh or canned fruit (canned in its own juice or light syrup) instead of juice. Fruit juice has very little or no fiber.  Eat low-fat dairy foods.  Drink fat-free (skim) milk or 1% milk. Eat fat-free yogurt and low-fat cottage cheese. Try low-fat cheeses such as mozzarella and other reduced-fat cheeses.  Choose meat and other protein foods that are low in fat.  Choose beans or other legumes such as split peas or lentils. Choose fish, skinless poultry (chicken or turkey), or lean cuts of red meat (beef or pork). Before you cook meat or poultry, cut off any visible fat.   Use less fat and oil.  Try baking foods instead of frying them. Add less fat, such as margarine, sour cream, regular salad dressing and mayonnaise to foods. Eat fewer high-fat foods. Some examples of high-fat foods include french fries, doughnuts, ice cream, and cakes.  Eat fewer sweets.  Limit foods and drinks that are high in sugar. This includes candy, cookies,  regular soda, and sweetened drinks.  Exercise:  Exercise at least 30 minutes per day on most days of the week. Some examples of exercise include walking, biking, dancing, and swimming. You can also fit in more physical activity by taking the stairs instead of the elevator or parking farther away from stores. Ask your healthcare provider about the best exercise plan for you.      © Copyright [employer] 2018 Information is for End User's use only and may not be sold, redistributed or otherwise used for commercial purposes. All illustrations and images included in CareNotes® are the copyrighted property of [employer] or [employer]

## 2024-01-26 ENCOUNTER — HOME CARE VISIT (OUTPATIENT)
Dept: HOME HEALTH SERVICES | Facility: HOME HEALTHCARE | Age: 89
End: 2024-01-26
Payer: MEDICARE

## 2024-01-26 PROCEDURE — G0299 HHS/HOSPICE OF RN EA 15 MIN: HCPCS

## 2024-01-27 VITALS
TEMPERATURE: 97.2 F | OXYGEN SATURATION: 99 % | DIASTOLIC BLOOD PRESSURE: 78 MMHG | HEART RATE: 91 BPM | SYSTOLIC BLOOD PRESSURE: 124 MMHG | RESPIRATION RATE: 20 BRPM

## 2024-01-30 ENCOUNTER — TELEPHONE (OUTPATIENT)
Dept: FAMILY MEDICINE CLINIC | Facility: CLINIC | Age: 89
End: 2024-01-30

## 2024-01-30 DIAGNOSIS — Z91.89 DRIVING SAFETY ISSUE: Primary | ICD-10-CM

## 2024-01-30 NOTE — TELEPHONE ENCOUNTER
"\"My name is Dionne Zuluaga and I had an appointment and saw Doctor Ifeoma Billingsley on the 20, 5th of this month and she recommended I have someone come to and tell me about for my driving the car. And now I got an answer back and they told me it would cost me 400 and some dollars to have this lady come to my house to tell me if I can drive or not. I cannot afford $400.00. I am sorry, I cannot afford that and I am wondering why it would be that expensive to have someone come and see if I can drive. My name is Dionne Zuluaga. My phone number is [phone]. Thank you.\"    "

## 2024-01-30 NOTE — TELEPHONE ENCOUNTER
Please give her referral to [facility] in [place] for fitness to drive evaluation. I was unaware it costs that much.

## 2024-02-01 ENCOUNTER — HOME CARE VISIT (OUTPATIENT)
Dept: HOME HEALTH SERVICES | Facility: HOME HEALTHCARE | Age: 89
End: 2024-02-01
Payer: MEDICARE

## 2024-02-01 ENCOUNTER — APPOINTMENT (OUTPATIENT)
Dept: LAB | Facility: CLINIC | Age: 89
End: 2024-02-01
Payer: MEDICARE

## 2024-02-01 DIAGNOSIS — R05.3 CHRONIC COUGH: ICD-10-CM

## 2024-02-01 DIAGNOSIS — I71.43 INFRARENAL ABDOMINAL AORTIC ANEURYSM (AAA) WITHOUT RUPTURE (HCC): ICD-10-CM

## 2024-02-01 DIAGNOSIS — N18.32 CKD STAGE G3B/A1, GFR 30-44 AND ALBUMIN CREATININE RATIO <30 MG/G (HCC): ICD-10-CM

## 2024-02-01 DIAGNOSIS — I16.1 HYPERTENSIVE EMERGENCY: ICD-10-CM

## 2024-02-01 DIAGNOSIS — E78.2 MIXED HYPERLIPIDEMIA: ICD-10-CM

## 2024-02-01 DIAGNOSIS — E78.5 HYPERLIPIDEMIA, UNSPECIFIED HYPERLIPIDEMIA TYPE: ICD-10-CM

## 2024-02-01 LAB
BASOPHILS # BLD AUTO: 0.05 THOUSANDS/ÂΜL (ref 0–0.1)
BASOPHILS NFR BLD AUTO: 1 % (ref 0–1)
EOSINOPHIL # BLD AUTO: 0.33 THOUSAND/ÂΜL (ref 0–0.61)
EOSINOPHIL NFR BLD AUTO: 4 % (ref 0–6)
ERYTHROCYTE [DISTWIDTH] IN BLOOD BY AUTOMATED COUNT: 13.9 % (ref 11.6–15.1)
HCT VFR BLD AUTO: 43.3 % (ref 34.8–46.1)
HGB BLD-MCNC: 13.9 G/DL (ref 11.5–15.4)
IMM GRANULOCYTES # BLD AUTO: 0.02 THOUSAND/UL (ref 0–0.2)
IMM GRANULOCYTES NFR BLD AUTO: 0 % (ref 0–2)
LYMPHOCYTES # BLD AUTO: 2.74 THOUSANDS/ÂΜL (ref 0.6–4.47)
LYMPHOCYTES NFR BLD AUTO: 30 % (ref 14–44)
MCH RBC QN AUTO: 30 PG (ref 26.8–34.3)
MCHC RBC AUTO-ENTMCNC: 32.1 G/DL (ref 31.4–37.4)
MCV RBC AUTO: 93 FL (ref 82–98)
MONOCYTES # BLD AUTO: 0.76 THOUSAND/ÂΜL (ref 0.17–1.22)
MONOCYTES NFR BLD AUTO: 8 % (ref 4–12)
NEUTROPHILS # BLD AUTO: 5.13 THOUSANDS/ÂΜL (ref 1.85–7.62)
NEUTS SEG NFR BLD AUTO: 57 % (ref 43–75)
NRBC BLD AUTO-RTO: 0 /100 WBCS
PLATELET # BLD AUTO: 295 THOUSANDS/UL (ref 149–390)
PMV BLD AUTO: 10.3 FL (ref 8.9–12.7)
RBC # BLD AUTO: 4.64 MILLION/UL (ref 3.81–5.12)
WBC # BLD AUTO: 9.03 THOUSAND/UL (ref 4.31–10.16)

## 2024-02-01 PROCEDURE — 36415 COLL VENOUS BLD VENIPUNCTURE: CPT

## 2024-02-01 PROCEDURE — 85025 COMPLETE CBC W/AUTO DIFF WBC: CPT

## 2024-02-01 PROCEDURE — G0299 HHS/HOSPICE OF RN EA 15 MIN: HCPCS

## 2024-02-01 PROCEDURE — 80061 LIPID PANEL: CPT

## 2024-02-01 PROCEDURE — 80053 COMPREHEN METABOLIC PANEL: CPT

## 2024-02-02 LAB
ALBUMIN SERPL BCP-MCNC: 3.8 G/DL (ref 3.5–5)
ALP SERPL-CCNC: 63 U/L (ref 34–104)
ALT SERPL W P-5'-P-CCNC: 22 U/L (ref 7–52)
ANION GAP SERPL CALCULATED.3IONS-SCNC: 11 MMOL/L
AST SERPL W P-5'-P-CCNC: 21 U/L (ref 13–39)
BILIRUB SERPL-MCNC: 0.52 MG/DL (ref 0.2–1)
BUN SERPL-MCNC: 19 MG/DL (ref 5–25)
CALCIUM SERPL-MCNC: 9.6 MG/DL (ref 8.4–10.2)
CHLORIDE SERPL-SCNC: 101 MMOL/L (ref 96–108)
CHOLEST SERPL-MCNC: 187 MG/DL
CO2 SERPL-SCNC: 28 MMOL/L (ref 21–32)
CREAT SERPL-MCNC: 1.41 MG/DL (ref 0.6–1.3)
GFR SERPL CREATININE-BSD FRML MDRD: 32 ML/MIN/1.73SQ M
GLUCOSE SERPL-MCNC: 107 MG/DL (ref 65–140)
HDLC SERPL-MCNC: 47 MG/DL
LDLC SERPL CALC-MCNC: 84 MG/DL (ref 0–100)
POTASSIUM SERPL-SCNC: 4.1 MMOL/L (ref 3.5–5.3)
PROT SERPL-MCNC: 6.7 G/DL (ref 6.4–8.4)
SODIUM SERPL-SCNC: 140 MMOL/L (ref 135–147)
TRIGL SERPL-MCNC: 278 MG/DL

## 2024-02-04 VITALS
RESPIRATION RATE: 20 BRPM | DIASTOLIC BLOOD PRESSURE: 82 MMHG | SYSTOLIC BLOOD PRESSURE: 138 MMHG | TEMPERATURE: 97.5 F | OXYGEN SATURATION: 92 % | HEART RATE: 73 BPM

## 2024-02-05 ENCOUNTER — HOSPITAL ENCOUNTER (OUTPATIENT)
Dept: ULTRASOUND IMAGING | Facility: HOSPITAL | Age: 89
Discharge: HOME | End: 2024-02-05
Attending: INTERNAL MEDICINE
Payer: MEDICARE

## 2024-02-05 DIAGNOSIS — N18.32 CKD STAGE G3B/A1, GFR 30-44 AND ALBUMIN CREATININE RATIO <30 MG/G (HCC): ICD-10-CM

## 2024-02-05 DIAGNOSIS — I16.1 HYPERTENSIVE EMERGENCY: Primary | ICD-10-CM

## 2024-02-05 DIAGNOSIS — I71.43 INFRARENAL ABDOMINAL AORTIC ANEURYSM (AAA) WITHOUT RUPTURE (HCC): ICD-10-CM

## 2024-02-05 DIAGNOSIS — R31.9 HEMATURIA, UNSPECIFIED TYPE: ICD-10-CM

## 2024-02-05 LAB
BACTERIA UR QL AUTO: NORMAL /HPF
BILIRUB UR QL STRIP: NEGATIVE
CLARITY UR: CLEAR
COLOR UR: NORMAL
GLUCOSE UR STRIP-MCNC: NEGATIVE MG/DL
HGB UR QL STRIP.AUTO: NEGATIVE
KETONES UR STRIP-MCNC: NEGATIVE MG/DL
LEUKOCYTE ESTERASE UR QL STRIP: NEGATIVE
NITRITE UR QL STRIP: NEGATIVE
NON-SQ EPI CELLS URNS QL MICRO: NORMAL /HPF
PH UR STRIP.AUTO: 6 [PH]
PROT UR STRIP-MCNC: NEGATIVE MG/DL
RBC #/AREA URNS AUTO: NORMAL /HPF
SP GR UR STRIP.AUTO: 1.01 (ref 1–1.03)
UROBILINOGEN UR STRIP-ACNC: <2 MG/DL
WBC #/AREA URNS AUTO: NORMAL /HPF

## 2024-02-05 PROCEDURE — 76706 US ABDL AORTA SCREEN AAA: CPT

## 2024-02-05 PROCEDURE — 81001 URINALYSIS AUTO W/SCOPE: CPT | Performed by: INTERNAL MEDICINE

## 2024-02-05 RX ORDER — LISINOPRIL 20 MG/1
20 TABLET ORAL DAILY
Qty: 90 TABLET | Refills: 1 | Status: SHIPPED | OUTPATIENT
Start: 2024-02-05

## 2024-05-15 ENCOUNTER — OFFICE VISIT (OUTPATIENT)
Dept: FAMILY MEDICINE CLINIC | Facility: CLINIC | Age: 89
End: 2024-05-15
Payer: MEDICARE

## 2024-05-15 VITALS
WEIGHT: 190.4 LBS | RESPIRATION RATE: 18 BRPM | SYSTOLIC BLOOD PRESSURE: 120 MMHG | OXYGEN SATURATION: 96 % | HEIGHT: 63 IN | DIASTOLIC BLOOD PRESSURE: 70 MMHG | TEMPERATURE: 97.2 F | BODY MASS INDEX: 33.73 KG/M2 | HEART RATE: 66 BPM

## 2024-05-15 DIAGNOSIS — E78.2 MIXED HYPERLIPIDEMIA: ICD-10-CM

## 2024-05-15 DIAGNOSIS — R05.3 CHRONIC COUGH: ICD-10-CM

## 2024-05-15 DIAGNOSIS — N18.32 CKD STAGE G3B/A1, GFR 30-44 AND ALBUMIN CREATININE RATIO <30 MG/G (HCC): ICD-10-CM

## 2024-05-15 DIAGNOSIS — I16.1 HYPERTENSIVE EMERGENCY: Primary | ICD-10-CM

## 2024-05-15 DIAGNOSIS — R73.03 PREDIABETES: ICD-10-CM

## 2024-05-15 DIAGNOSIS — F17.200 TOBACCO USE DISORDER: ICD-10-CM

## 2024-05-15 PROBLEM — F17.211 CIGARETTE NICOTINE DEPENDENCE IN REMISSION: Status: RESOLVED | Noted: 2024-01-24 | Resolved: 2024-05-15

## 2024-05-15 PROBLEM — I15.0 RENOVASCULAR HYPERTENSION: Status: ACTIVE | Noted: 2024-05-15

## 2024-05-15 PROCEDURE — G2211 COMPLEX E/M VISIT ADD ON: HCPCS | Performed by: INTERNAL MEDICINE

## 2024-05-15 PROCEDURE — 99214 OFFICE O/P EST MOD 30 MIN: CPT | Performed by: INTERNAL MEDICINE

## 2024-05-15 NOTE — PROGRESS NOTES
[facility]        NAME: Dionne Zuluaga is a 89 y.o. female  : 1931    MRN: [id number]  DATE: May 15, 2024  TIME: 1:57 PM    Assessment and Plan   1. Hypertensive emergency  2. Tobacco use disorder  3. Chronic cough  4. Mixed hyperlipidemia  5. CKD stage G3b/A1, GFR 30-44 and albumin creatinine ratio <30 mg/g ([facility])  -     Comprehensive metabolic panel; Future; Expected date: 05/15/2024  -     CBC and differential; Future; Expected date: 05/15/2024  -     Albumin / creatinine urine ratio; Future; Expected date: 05/15/2024  -     Urinalysis with microscopic; Future; Expected date: 05/15/2024  -     PTH, intact; Future; Expected date: 05/15/2024  -     Vitamin D 25 hydroxy; Future; Expected date: 05/15/2024  -     Phosphorus; Future; Expected date: 05/15/2024  6. Prediabetes  -     Hemoglobin A1C; Future           Chief Complaint     Chief Complaint   Patient presents with   • Follow-up         History of Present Illness       89yo female with history of HTN, CKD stage 3b here for 3 month follow up. Admits to relapsing with cigarettes due to boredom. Currently about 8-10 cigarettes per day. Not interested in patches or medications for cessation. Denies chest pain, dyspnea or SOB. Otherwise feeling well.         Review of Systems   Review of Systems   Constitutional:  Negative for appetite change, chills, fatigue and fever.   Respiratory:  Negative for chest tightness and shortness of breath.    Cardiovascular:  Negative for chest pain, palpitations and leg swelling.   Neurological:  Negative for dizziness and light-headedness.         Current Medications       Current Outpatient Medications:   •  calcium citrate-vitamin D (CITRACAL+D) 315-200 MG-UNIT per tablet, Take 1 tablet by mouth 2 (two) times a day, Disp: , Rfl:   •  lisinopril (ZESTRIL) 20 mg tablet, Take 1 tablet (20 mg total) by mouth daily, Disp: 90 tablet, Rfl: 1  •  Multiple Vitamin (MULTIVITAMIN) tablet, Take 1 tablet by mouth  "daily, Disp: , Rfl:   •  Omega-3 Fatty Acids (FISH OIL) 1,000 mg, Take 1,000 mg by mouth daily, Disp: , Rfl:   •  simvastatin (ZOCOR) 40 mg tablet, , Disp: , Rfl:   •  vitamin E, tocopherol, 1,000 units capsule, Take 1,000 Units by mouth daily, Disp: , Rfl:     Current Allergies     Allergies as of 05/15/2024 - Reviewed 05/15/2024   Allergen Reaction Noted   • Alcohol - food allergy Hives 11/28/2016            The following portions of the patient's history were reviewed and updated as appropriate: allergies, current medications, past family history, past medical history, past social history, past surgical history and problem list.     Past Medical History:   Diagnosis Date   • Acute respiratory failure with hypoxia and hypercapnia (HCC)    • Elevated d-dimer    • Hyperlipidemia    • Hypertension    • Severe sepsis (HCC)        Past Surgical History:   Procedure Laterality Date   • HYSTERECTOMY  1974       No family history on file.      Medications have been verified.        Objective   /70   Pulse 66   Temp (!) 97.2 °F (36.2 °C)   Resp 18   Ht 5' 3\" (1.6 m)   Wt 86.4 kg (190 lb 6.4 oz)   SpO2 96%   BMI 33.73 kg/m²        Physical Exam     Physical Exam  Vitals reviewed.   Constitutional:       General: She is not in acute distress.     Appearance: She is normal weight.   Cardiovascular:      Rate and Rhythm: Normal rate and regular rhythm.      Heart sounds: No murmur heard.     No friction rub. No gallop.   Pulmonary:      Effort: Pulmonary effort is normal. No respiratory distress.      Breath sounds: No wheezing, rhonchi or rales.   Neurological:      General: No focal deficit present.      Mental Status: She is alert.   Psychiatric:         Mood and Affect: Mood normal.         Behavior: Behavior normal.             Results:  Lab Results   Component Value Date    SODIUM 140 02/01/2024    K 4.1 02/01/2024     02/01/2024    CO2 28 02/01/2024    BUN 19 02/01/2024    CREATININE 1.41 (H) " 02/01/2024    GLUC 107 02/01/2024    CALCIUM 9.6 02/01/2024       Lab Results   Component Value Date    HGBA1C 6.8 (H) 01/01/2024       Lab Results   Component Value Date    WBC 9.03 02/01/2024    HGB 13.9 02/01/2024    HCT 43.3 02/01/2024    MCV 93 02/01/2024     02/01/2024

## 2024-07-24 DIAGNOSIS — I16.1 HYPERTENSIVE EMERGENCY: ICD-10-CM

## 2024-07-24 DIAGNOSIS — N18.32 CKD STAGE G3B/A1, GFR 30-44 AND ALBUMIN CREATININE RATIO <30 MG/G (HCC): ICD-10-CM

## 2024-07-24 RX ORDER — LISINOPRIL 20 MG/1
20 TABLET ORAL DAILY
Qty: 100 TABLET | Refills: 1 | Status: SHIPPED | OUTPATIENT
Start: 2024-07-24

## 2024-08-06 DIAGNOSIS — E78.2 MIXED HYPERLIPIDEMIA: Primary | ICD-10-CM

## 2024-08-06 NOTE — TELEPHONE ENCOUNTER
Reason for call:   [x] Refill   [] Prior Auth  [] Other:     Office:   [x] PCP/Provider -   [] Specialty/Provider -     Medication:         Pharmacy: [facility] [place]    Does the patient have enough for 3 days?   [] Yes   [x] No - Send as HP to POD

## 2024-08-07 RX ORDER — SIMVASTATIN 40 MG
40 TABLET ORAL
Qty: 90 TABLET | Refills: 3 | Status: SHIPPED | OUTPATIENT
Start: 2024-08-07

## 2024-09-12 ENCOUNTER — TELEPHONE (OUTPATIENT)
Dept: ADMINISTRATIVE | Facility: OTHER | Age: 89
End: 2024-09-12

## 2024-09-12 NOTE — TELEPHONE ENCOUNTER
09/12/24 12:20 PM    Patient contacted to bring Advance Directive, POLST, or Living Will document to next scheduled pcp visit.[facility] Department left message.    Thank you.  Von Grimm MA  [facility]

## 2024-09-16 ENCOUNTER — APPOINTMENT (OUTPATIENT)
Dept: LAB | Facility: CLINIC | Age: 89
End: 2024-09-16
Payer: MEDICARE

## 2024-09-16 ENCOUNTER — OFFICE VISIT (OUTPATIENT)
Dept: FAMILY MEDICINE CLINIC | Facility: CLINIC | Age: 89
End: 2024-09-16
Payer: MEDICARE

## 2024-09-16 VITALS
OXYGEN SATURATION: 97 % | DIASTOLIC BLOOD PRESSURE: 74 MMHG | TEMPERATURE: 97.9 F | HEIGHT: 63 IN | BODY MASS INDEX: 33.7 KG/M2 | HEART RATE: 74 BPM | WEIGHT: 190.2 LBS | SYSTOLIC BLOOD PRESSURE: 120 MMHG | RESPIRATION RATE: 16 BRPM

## 2024-09-16 DIAGNOSIS — R05.3 CHRONIC COUGH: ICD-10-CM

## 2024-09-16 DIAGNOSIS — N18.32 CKD STAGE G3B/A1, GFR 30-44 AND ALBUMIN CREATININE RATIO <30 MG/G (HCC): ICD-10-CM

## 2024-09-16 DIAGNOSIS — R73.03 PREDIABETES: ICD-10-CM

## 2024-09-16 DIAGNOSIS — N18.4 CKD (CHRONIC KIDNEY DISEASE) STAGE 4, GFR 15-29 ML/MIN (HCC): ICD-10-CM

## 2024-09-16 DIAGNOSIS — I15.0 RENOVASCULAR HYPERTENSION: Primary | ICD-10-CM

## 2024-09-16 PROBLEM — R13.10 DYSPHAGIA: Status: RESOLVED | Noted: 2024-01-05 | Resolved: 2024-09-16

## 2024-09-16 LAB
25(OH)D3 SERPL-MCNC: 39 NG/ML (ref 30–100)
ALBUMIN SERPL BCG-MCNC: 3.9 G/DL (ref 3.5–5)
ALP SERPL-CCNC: 68 U/L (ref 34–104)
ALT SERPL W P-5'-P-CCNC: 19 U/L (ref 7–52)
ANION GAP SERPL CALCULATED.3IONS-SCNC: 8 MMOL/L (ref 4–13)
AST SERPL W P-5'-P-CCNC: 19 U/L (ref 13–39)
BACTERIA UR QL AUTO: ABNORMAL /HPF
BASOPHILS # BLD AUTO: 0.06 THOUSANDS/ΜL (ref 0–0.1)
BASOPHILS NFR BLD AUTO: 1 % (ref 0–1)
BILIRUB SERPL-MCNC: 0.39 MG/DL (ref 0.2–1)
BILIRUB UR QL STRIP: NEGATIVE
BUN SERPL-MCNC: 34 MG/DL (ref 5–25)
CALCIUM SERPL-MCNC: 9.3 MG/DL (ref 8.4–10.2)
CHLORIDE SERPL-SCNC: 102 MMOL/L (ref 96–108)
CHOLEST SERPL-MCNC: 168 MG/DL
CLARITY UR: CLEAR
CO2 SERPL-SCNC: 28 MMOL/L (ref 21–32)
COLOR UR: ABNORMAL
CREAT SERPL-MCNC: 1.52 MG/DL (ref 0.6–1.3)
CREAT UR-MCNC: 225.8 MG/DL
EOSINOPHIL # BLD AUTO: 0.48 THOUSAND/ΜL (ref 0–0.61)
EOSINOPHIL NFR BLD AUTO: 5 % (ref 0–6)
ERYTHROCYTE [DISTWIDTH] IN BLOOD BY AUTOMATED COUNT: 15 % (ref 11.6–15.1)
GFR SERPL CREATININE-BSD FRML MDRD: 29 ML/MIN/1.73SQ M
GLUCOSE P FAST SERPL-MCNC: 114 MG/DL (ref 65–99)
GLUCOSE UR STRIP-MCNC: NEGATIVE MG/DL
HCT VFR BLD AUTO: 44.5 % (ref 34.8–46.1)
HDLC SERPL-MCNC: 42 MG/DL
HGB BLD-MCNC: 14.3 G/DL (ref 11.5–15.4)
HGB UR QL STRIP.AUTO: NEGATIVE
HYALINE CASTS #/AREA URNS LPF: ABNORMAL /LPF
IMM GRANULOCYTES # BLD AUTO: 0.04 THOUSAND/UL (ref 0–0.2)
IMM GRANULOCYTES NFR BLD AUTO: 0 % (ref 0–2)
KETONES UR STRIP-MCNC: ABNORMAL MG/DL
LDLC SERPL DIRECT ASSAY-MCNC: 93 MG/DL (ref 0–100)
LEUKOCYTE ESTERASE UR QL STRIP: ABNORMAL
LYMPHOCYTES # BLD AUTO: 2.62 THOUSANDS/ΜL (ref 0.6–4.47)
LYMPHOCYTES NFR BLD AUTO: 25 % (ref 14–44)
MCH RBC QN AUTO: 29.9 PG (ref 26.8–34.3)
MCHC RBC AUTO-ENTMCNC: 32.1 G/DL (ref 31.4–37.4)
MCV RBC AUTO: 93 FL (ref 82–98)
MICROALBUMIN UR-MCNC: 25.6 MG/L
MICROALBUMIN/CREAT 24H UR: 11 MG/G CREATININE (ref 0–30)
MONOCYTES # BLD AUTO: 0.92 THOUSAND/ΜL (ref 0.17–1.22)
MONOCYTES NFR BLD AUTO: 9 % (ref 4–12)
MUCOUS THREADS UR QL AUTO: ABNORMAL
NEUTROPHILS # BLD AUTO: 6.31 THOUSANDS/ΜL (ref 1.85–7.62)
NEUTS SEG NFR BLD AUTO: 60 % (ref 43–75)
NITRITE UR QL STRIP: NEGATIVE
NON-SQ EPI CELLS URNS QL MICRO: ABNORMAL /HPF
NRBC BLD AUTO-RTO: 0 /100 WBCS
PH UR STRIP.AUTO: 6 [PH]
PHOSPHATE SERPL-MCNC: 3.6 MG/DL (ref 2.3–4.1)
PLATELET # BLD AUTO: 251 THOUSANDS/UL (ref 149–390)
PMV BLD AUTO: 11.1 FL (ref 8.9–12.7)
POTASSIUM SERPL-SCNC: 4.3 MMOL/L (ref 3.5–5.3)
PROT SERPL-MCNC: 6.9 G/DL (ref 6.4–8.4)
PROT UR STRIP-MCNC: ABNORMAL MG/DL
PTH-INTACT SERPL-MCNC: 55.2 PG/ML (ref 12–88)
RBC # BLD AUTO: 4.79 MILLION/UL (ref 3.81–5.12)
RBC #/AREA URNS AUTO: ABNORMAL /HPF
SODIUM SERPL-SCNC: 138 MMOL/L (ref 135–147)
SP GR UR STRIP.AUTO: 1.02 (ref 1–1.03)
TRIGL SERPL-MCNC: 426 MG/DL
UROBILINOGEN UR STRIP-ACNC: 2 MG/DL
WBC # BLD AUTO: 10.43 THOUSAND/UL (ref 4.31–10.16)
WBC #/AREA URNS AUTO: ABNORMAL /HPF

## 2024-09-16 PROCEDURE — 82570 ASSAY OF URINE CREATININE: CPT

## 2024-09-16 PROCEDURE — 84100 ASSAY OF PHOSPHORUS: CPT

## 2024-09-16 PROCEDURE — 83970 ASSAY OF PARATHORMONE: CPT

## 2024-09-16 PROCEDURE — 83036 HEMOGLOBIN GLYCOSYLATED A1C: CPT

## 2024-09-16 PROCEDURE — 36415 COLL VENOUS BLD VENIPUNCTURE: CPT

## 2024-09-16 PROCEDURE — G2211 COMPLEX E/M VISIT ADD ON: HCPCS | Performed by: INTERNAL MEDICINE

## 2024-09-16 PROCEDURE — 82043 UR ALBUMIN QUANTITATIVE: CPT

## 2024-09-16 PROCEDURE — 82306 VITAMIN D 25 HYDROXY: CPT

## 2024-09-16 PROCEDURE — 85025 COMPLETE CBC W/AUTO DIFF WBC: CPT

## 2024-09-16 PROCEDURE — 81001 URINALYSIS AUTO W/SCOPE: CPT

## 2024-09-16 PROCEDURE — 99214 OFFICE O/P EST MOD 30 MIN: CPT | Performed by: INTERNAL MEDICINE

## 2024-09-16 PROCEDURE — 80053 COMPREHEN METABOLIC PANEL: CPT

## 2024-09-16 NOTE — PROGRESS NOTES
Ambulatory Visit  Name: Dionne Zuluaga      : 1931      MRN: [id number]  Encounter Provider: Gloria Hall MD  Encounter Date: 2024   Encounter department: [facility]    Assessment & Plan  Renovascular hypertension  Bp controlled with lisinopril, she will get labs done today to monitor kidney function and CKD       CKD (chronic kidney disease) stage 4, GFR 15-29 ml/min ([facility])  Lab Results   Component Value Date    EGFR 29 2024    EGFR 32 2024    EGFR 37 2024    CREATININE 1.52 (H) 2024    CREATININE 1.41 (H) 2024    CREATININE 1.25 2024       Orders:    Comprehensive metabolic panel; Future    CBC and differential; Future    Albumin / creatinine urine ratio; Future    Urinalysis with microscopic; Future    PTH, intact; Future    Phosphorus; Future    Vitamin D 25 hydroxy; Future    Hemoglobin A1C; Future    Prediabetes    Orders:    Hemoglobin A1C; Future    Chronic cough            History of Present Illness     89yo female with history of HTN, tobacco abuse, CKD stage 3b here for 3 month follow up. Did not get labs done prior to this visit. Feeling well, denies chest pain, dyspnea or SOB. Still smoking about 5-6 cigarettes per day and not interested in quitting or cutting down.           Review of Systems   Constitutional:  Negative for appetite change, chills, fatigue and fever.   Respiratory:  Positive for cough. Negative for chest tightness and shortness of breath.    Cardiovascular:  Negative for chest pain and leg swelling.   Neurological:  Negative for dizziness and light-headedness.     Current Outpatient Medications on File Prior to Visit   Medication Sig Dispense Refill    calcium citrate-vitamin D (CITRACAL+D) 315-200 MG-UNIT per tablet Take 1 tablet by mouth 2 (two) times a day      lisinopril (ZESTRIL) 20 mg tablet Take 1 tablet (20 mg total) by mouth daily 100 tablet 1    Multiple Vitamin (MULTIVITAMIN) tablet Take 1 tablet  "by mouth daily      Omega-3 Fatty Acids (FISH OIL) 1,000 mg Take 1,000 mg by mouth daily      simvastatin (ZOCOR) 40 mg tablet Take 1 tablet (40 mg total) by mouth daily at bedtime 90 tablet 3    vitamin E, tocopherol, 1,000 units capsule Take 1,000 Units by mouth daily       No current facility-administered medications on file prior to visit.          Objective     /74   Pulse 74   Temp 97.9 °F (36.6 °C)   Resp 16   Ht 5' 3\" (1.6 m)   Wt 86.3 kg (190 lb 3.2 oz)   SpO2 97%   BMI 33.69 kg/m²     Physical Exam  Vitals reviewed.   Constitutional:       General: She is not in acute distress.     Appearance: She is normal weight.   Cardiovascular:      Rate and Rhythm: Normal rate and regular rhythm.      Heart sounds: No murmur heard.     No friction rub. No gallop.   Pulmonary:      Effort: No respiratory distress.      Breath sounds: Rhonchi present. No wheezing.   Musculoskeletal:      Right lower leg: No edema.      Left lower leg: No edema.   Neurological:      General: No focal deficit present.      Mental Status: She is alert.   Psychiatric:         Mood and Affect: Mood normal.         Behavior: Behavior normal.         "

## 2024-09-17 LAB
EST. AVERAGE GLUCOSE BLD GHB EST-MCNC: 146 MG/DL
HBA1C MFR BLD: 6.7 %

## 2024-09-23 DIAGNOSIS — E78.2 MIXED HYPERLIPIDEMIA: Primary | ICD-10-CM

## 2024-09-23 RX ORDER — ROSUVASTATIN CALCIUM 10 MG/1
10 TABLET, COATED ORAL DAILY
Qty: 100 TABLET | Refills: 3 | Status: SHIPPED | OUTPATIENT
Start: 2024-09-23

## 2024-11-20 ENCOUNTER — TELEPHONE (OUTPATIENT)
Dept: FAMILY MEDICINE CLINIC | Facility: CLINIC | Age: 89
End: 2024-11-20

## 2025-02-03 ENCOUNTER — TELEPHONE (OUTPATIENT)
Dept: FAMILY MEDICINE CLINIC | Facility: CLINIC | Age: OVER 89
End: 2025-02-03

## 2025-02-14 DIAGNOSIS — N18.32 CKD STAGE G3B/A1, GFR 30-44 AND ALBUMIN CREATININE RATIO <30 MG/G (HCC): ICD-10-CM

## 2025-02-14 DIAGNOSIS — I16.1 HYPERTENSIVE EMERGENCY: ICD-10-CM

## 2025-02-14 RX ORDER — LISINOPRIL 20 MG/1
20 TABLET ORAL DAILY
Qty: 100 TABLET | Refills: 1 | Status: SHIPPED | OUTPATIENT
Start: 2025-02-14

## 2025-02-14 NOTE — TELEPHONE ENCOUNTER
Patient requesting refill(s) of: Lisinopril 20 mg     Last filled: 7/24/24  Last appt: 9/16/24  Next appt:4/16/25  Pharmacy: [facility] [place]

## 2025-04-16 ENCOUNTER — APPOINTMENT (OUTPATIENT)
Dept: LAB | Facility: CLINIC | Age: OVER 89
End: 2025-04-16
Attending: INTERNAL MEDICINE
Payer: MEDICARE

## 2025-04-16 ENCOUNTER — OFFICE VISIT (OUTPATIENT)
Dept: FAMILY MEDICINE CLINIC | Facility: CLINIC | Age: OVER 89
End: 2025-04-16
Payer: MEDICARE

## 2025-04-16 VITALS
OXYGEN SATURATION: 97 % | RESPIRATION RATE: 16 BRPM | BODY MASS INDEX: 33.27 KG/M2 | HEART RATE: 52 BPM | DIASTOLIC BLOOD PRESSURE: 82 MMHG | HEIGHT: 63 IN | TEMPERATURE: 97.7 F | SYSTOLIC BLOOD PRESSURE: 130 MMHG | WEIGHT: 187.8 LBS

## 2025-04-16 DIAGNOSIS — R73.03 PREDIABETES: ICD-10-CM

## 2025-04-16 DIAGNOSIS — I15.0 RENOVASCULAR HYPERTENSION: ICD-10-CM

## 2025-04-16 DIAGNOSIS — N18.4 CKD (CHRONIC KIDNEY DISEASE) STAGE 4, GFR 15-29 ML/MIN (HCC): ICD-10-CM

## 2025-04-16 DIAGNOSIS — E78.2 MIXED HYPERLIPIDEMIA: ICD-10-CM

## 2025-04-16 DIAGNOSIS — F17.200 TOBACCO USE DISORDER: ICD-10-CM

## 2025-04-16 DIAGNOSIS — Z00.00 MEDICARE ANNUAL WELLNESS VISIT, SUBSEQUENT: Primary | ICD-10-CM

## 2025-04-16 PROCEDURE — 81001 URINALYSIS AUTO W/SCOPE: CPT

## 2025-04-16 PROCEDURE — 83970 ASSAY OF PARATHORMONE: CPT

## 2025-04-16 PROCEDURE — 82570 ASSAY OF URINE CREATININE: CPT

## 2025-04-16 PROCEDURE — 36415 COLL VENOUS BLD VENIPUNCTURE: CPT

## 2025-04-16 PROCEDURE — 82306 VITAMIN D 25 HYDROXY: CPT

## 2025-04-16 PROCEDURE — 82043 UR ALBUMIN QUANTITATIVE: CPT

## 2025-04-16 PROCEDURE — 84100 ASSAY OF PHOSPHORUS: CPT

## 2025-04-16 PROCEDURE — 83036 HEMOGLOBIN GLYCOSYLATED A1C: CPT

## 2025-04-16 PROCEDURE — G0439 PPPS, SUBSEQ VISIT: HCPCS | Performed by: INTERNAL MEDICINE

## 2025-04-16 PROCEDURE — 85025 COMPLETE CBC W/AUTO DIFF WBC: CPT

## 2025-04-16 PROCEDURE — 80053 COMPREHEN METABOLIC PANEL: CPT

## 2025-04-16 NOTE — PATIENT INSTRUCTIONS
Medicare Preventive Visit Patient Instructions  Thank you for completing your Welcome to Medicare Visit or Medicare Annual Wellness Visit today. Your next wellness visit will be due in one year (4/17/2026).  The screening/preventive services that you may require over the next 5-10 years are detailed below. Some tests may not apply to you based off risk factors and/or age. Screening tests ordered at today's visit but not completed yet may show as past due. Also, please note that scanned in results may not display below.  Preventive Screenings:  Service Recommendations Previous Testing/Comments   Colorectal Cancer Screening  * Colonoscopy    * Fecal Occult Blood Test (FOBT)/Fecal Immunochemical Test (FIT)  * Fecal DNA/Cologuard Test  * Flexible Sigmoidoscopy Age: 45-75 years old   Colonoscopy: every 10 years (may be performed more frequently if at higher risk)  OR  FOBT/FIT: every 1 year  OR  Cologuard: every 3 years  OR  Sigmoidoscopy: every 5 years  Screening may be recommended earlier than age 45 if at higher risk for colorectal cancer. Also, an individualized decision between you and your healthcare provider will decide whether screening between the ages of 76-85 would be appropriate. Colonoscopy: Not on file  FOBT/FIT: Not on file  Cologuard: Not on file  Sigmoidoscopy: Not on file    Screening Not Indicated     Breast Cancer Screening Age: 40+ years old  Frequency: every 1-2 years  Not required if history of left and right mastectomy Mammogram: 09/25/2020        Cervical Cancer Screening Between the ages of 21-29, pap smear recommended once every 3 years.   Between the ages of 30-65, can perform pap smear with HPV co-testing every 5 years.   Recommendations may differ for women with a history of total hysterectomy, cervical cancer, or abnormal pap smears in past. Pap Smear: Not on file    Screening Not Indicated   Hepatitis C Screening Once for adults born between 1945 and 1965  More frequently in patients at  high risk for Hepatitis C Hep C Antibody: Not on file        Diabetes Screening 1-2 times per year if you're at risk for diabetes or have pre-diabetes Fasting glucose: 114 mg/dL (9/16/2024)  A1C: 6.7 % (9/16/2024)  Screening Current   Cholesterol Screening Once every 5 years if you don't have a lipid disorder. May order more often based on risk factors. Lipid panel: 09/16/2024    Screening Not Indicated  History Lipid Disorder     Other Preventive Screenings Covered by Medicare:  Abdominal Aortic Aneurysm (AAA) Screening: covered once if your at risk. You're considered to be at risk if you have a family history of AAA.  Lung Cancer Screening: covers low dose CT scan once per year if you meet all of the following conditions: (1) Age 55-77; (2) No signs or symptoms of lung cancer; (3) Current smoker or have quit smoking within the last 15 years; (4) You have a tobacco smoking history of at least 20 pack years (packs per day multiplied by number of years you smoked); (5) You get a written order from a healthcare provider.  Glaucoma Screening: covered annually if you're considered high risk: (1) You have diabetes OR (2) Family history of glaucoma OR (3)  aged 50 and older OR (4)  American aged 65 and older  Osteoporosis Screening: covered every 2 years if you meet one of the following conditions: (1) You're estrogen deficient and at risk for osteoporosis based off medical history and other findings; (2) Have a vertebral abnormality; (3) On glucocorticoid therapy for more than 3 months; (4) Have primary hyperparathyroidism; (5) On osteoporosis medications and need to assess response to drug therapy.   Last bone density test (DXA Scan): Not on file.  HIV Screening: covered annually if you're between the age of 15-65. Also covered annually if you are younger than 15 and older than 65 with risk factors for HIV infection. For pregnant patients, it is covered up to 3 times per  pregnancy.    Immunizations:  Immunization Recommendations   Influenza Vaccine Annual influenza vaccination during flu season is recommended for all persons aged >= 6 months who do not have contraindications   Pneumococcal Vaccine   * Pneumococcal conjugate vaccine = PCV13 (Prevnar 13), PCV15 (Vaxneuvance), PCV20 (Prevnar 20)  * Pneumococcal polysaccharide vaccine = PPSV23 (Pneumovax) Adults 19-63 yo with certain risk factors or if 65+ yo  If never received any pneumonia vaccine: recommend Prevnar 20 (PCV20)  Give PCV20 if previously received 1 dose of PCV13 or PPSV23   Hepatitis B Vaccine 3 dose series if at intermediate or high risk (ex: diabetes, end stage renal disease, liver disease)   Respiratory syncytial virus (RSV) Vaccine - COVERED BY MEDICARE PART D  * RSVPreF3 (Arexvy) CDC recommends that adults 60 years of age and older may receive a single dose of RSV vaccine using shared clinical decision-making (SCDM)   Tetanus (Td) Vaccine - COST NOT COVERED BY MEDICARE PART B Following completion of primary series, a booster dose should be given every 10 years to maintain immunity against tetanus. Td may also be given as tetanus wound prophylaxis.   Tdap Vaccine - COST NOT COVERED BY MEDICARE PART B Recommended at least once for all adults. For pregnant patients, recommended with each pregnancy.   Shingles Vaccine (Shingrix) - COST NOT COVERED BY MEDICARE PART B  2 shot series recommended in those 19 years and older who have or will have weakened immune systems or those 50 years and older     Health Maintenance Due:  There are no preventive care reminders to display for this patient.  Immunizations Due:      Topic Date Due   • Influenza Vaccine (1) 09/01/2024   • COVID-19 Vaccine (5 - 2024-25 season) 09/01/2024     Advance Directives   What are advance directives?  Advance directives are legal documents that state your wishes and plans for medical care. These plans are made ahead of time in case you lose your  ability to make decisions for yourself. Advance directives can apply to any medical decision, such as the treatments you want, and if you want to donate organs.   What are the types of advance directives?  There are many types of advance directives, and each state has rules about how to use them. You may choose a combination of any of the following:  Living will:  This is a written record of the treatment you want. You can also choose which treatments you do not want, which to limit, and which to stop at a certain time. This includes surgery, medicine, IV fluid, and tube feedings.   Durable power of  for healthcare (DPAHC):  This is a written record that states who you want to make healthcare choices for you when you are unable to make them for yourself. This person, called a proxy, is usually a family member or a friend. You may choose more than 1 proxy.  Do not resuscitate (DNR) order:  A DNR order is used in case your heart stops beating or you stop breathing. It is a request not to have certain forms of treatment, such as CPR. A DNR order may be included in other types of advance directives.  Medical directive:  This covers the care that you want if you are in a coma, near death, or unable to make decisions for yourself. You can list the treatments you want for each condition. Treatment may include pain medicine, surgery, blood transfusions, dialysis, IV or tube feedings, and a ventilator (breathing machine).  Values history:  This document has questions about your views, beliefs, and how you feel and think about life. This information can help others choose the care that you would choose.  Why are advance directives important?  An advance directive helps you control your care. Although spoken wishes may be used, it is better to have your wishes written down. Spoken wishes can be misunderstood, or not followed. Treatments may be given even if you do not want them. An advance directive may make it easier  for your family to make difficult choices about your care.   Urinary Incontinence   Urinary incontinence (UI)  is when you lose control of your bladder. UI develops because your bladder cannot store or empty urine properly. The 3 most common types of UI are stress incontinence, urge incontinence, or both.  Medicines:   May be given to help strengthen your bladder control. Report any side effects of medication to your healthcare provider.  Do pelvic muscle exercises often:  Your pelvic muscles help you stop urinating. Squeeze these muscles tight for 5 seconds, then relax for 5 seconds. Gradually work up to squeezing for 10 seconds. Do 3 sets of 15 repetitions a day, or as directed. This will help strengthen your pelvic muscles and improve bladder control.  Train your bladder:  Go to the bathroom at set times, such as every 2 hours, even if you do not feel the urge to go. You can also try to hold your urine when you feel the urge to go. For example, hold your urine for 5 minutes when you feel the urge to go. As that becomes easier, hold your urine for 10 minutes.   Self-care:   Keep a UI record.  Write down how often you leak urine and how much you leak. Make a note of what you were doing when you leaked urine.  Drink liquids as directed. You may need to limit the amount of liquid you drink to help control your urine leakage. Do not drink any liquid right before you go to bed. Limit or do not have drinks that contain caffeine or alcohol.   Prevent constipation.  Eat a variety of high-fiber foods. Good examples are high-fiber cereals, beans, vegetables, and whole-grain breads. Walking is the best way to trigger your intestines to have a bowel movement.  Exercise regularly and maintain a healthy weight.  Weight loss and exercise will decrease pressure on your bladder and help you control your leakage.   Use a catheter as directed  to help empty your bladder. A catheter is a tiny, plastic tube that is put into your  bladder to drain your urine.   Go to behavior therapy as directed.  Behavior therapy may be used to help you learn to control your urge to urinate.    Cigarette Smoking and Your Health   Risks to your health if you smoke:  Nicotine and other chemicals found in tobacco damage every cell in your body. Even if you are a light smoker, you have an increased risk for cancer, heart disease, and lung disease. If you are pregnant or have diabetes, smoking increases your risk for complications.   Benefits to your health if you stop smoking:   You decrease respiratory symptoms such as coughing, wheezing, and shortness of breath.   You reduce your risk for cancers of the lung, mouth, throat, kidney, bladder, pancreas, stomach, and cervix. If you already have cancer, you increase the benefits of chemotherapy. You also reduce your risk for cancer returning or a second cancer from developing.   You reduce your risk for heart disease, blood clots, heart attack, and stroke.   You reduce your risk for lung infections, and diseases such as pneumonia, asthma, chronic bronchitis, and emphysema.  Your circulation improves. More oxygen can be delivered to your body. If you have diabetes, you lower your risk for complications, such as kidney, artery, and eye diseases. You also lower your risk for nerve damage. Nerve damage can lead to amputations, poor vision, and blindness.  You improve your body's ability to heal and to fight infections.  For more information and support to stop smoking:   [employer]  Phone: 9- [phone]  Web Address: www.[employer]  Weight Management   Why it is important to manage your weight:  Being overweight increases your risk of health conditions such as heart disease, high blood pressure, type 2 diabetes, and certain types of cancer. It can also increase your risk for osteoarthritis, sleep apnea, and other respiratory problems. Aim for a slow, steady weight loss. Even a small amount of weight loss can  lower your risk of health problems.  How to lose weight safely:  A safe and healthy way to lose weight is to eat fewer calories and get regular exercise. You can lose up about 1 pound a week by decreasing the number of calories you eat by 500 calories each day.   Healthy meal plan for weight management:  A healthy meal plan includes a variety of foods, contains fewer calories, and helps you stay healthy. A healthy meal plan includes the following:  Eat whole-grain foods more often.  A healthy meal plan should contain fiber. Fiber is the part of grains, fruits, and vegetables that is not broken down by your body. Whole-grain foods are healthy and provide extra fiber in your diet. Some examples of whole-grain foods are whole-wheat breads and pastas, oatmeal, brown rice, and bulgur.  Eat a variety of vegetables every day.  Include dark, leafy greens such as spinach, kale, hipolito greens, and mustard greens. Eat yellow and orange vegetables such as carrots, sweet potatoes, and winter squash.   Eat a variety of fruits every day.  Choose fresh or canned fruit (canned in its own juice or light syrup) instead of juice. Fruit juice has very little or no fiber.  Eat low-fat dairy foods.  Drink fat-free (skim) milk or 1% milk. Eat fat-free yogurt and low-fat cottage cheese. Try low-fat cheeses such as mozzarella and other reduced-fat cheeses.  Choose meat and other protein foods that are low in fat.  Choose beans or other legumes such as split peas or lentils. Choose fish, skinless poultry (chicken or turkey), or lean cuts of red meat (beef or pork). Before you cook meat or poultry, cut off any visible fat.   Use less fat and oil.  Try baking foods instead of frying them. Add less fat, such as margarine, sour cream, regular salad dressing and mayonnaise to foods. Eat fewer high-fat foods. Some examples of high-fat foods include french fries, doughnuts, ice cream, and cakes.  Eat fewer sweets.  Limit foods and drinks that are  high in sugar. This includes candy, cookies, regular soda, and sweetened drinks.  Exercise:  Exercise at least 30 minutes per day on most days of the week. Some examples of exercise include walking, biking, dancing, and swimming. You can also fit in more physical activity by taking the stairs instead of the elevator or parking farther away from stores. Ask your healthcare provider about the best exercise plan for you.      © Copyright [employer] 2018 Information is for End User's use only and may not be sold, redistributed or otherwise used for commercial purposes. All illustrations and images included in CareNotes® are the copyrighted property of [employer]., Inc. or [employer]

## 2025-04-16 NOTE — PROGRESS NOTES
Name: Dionne Zuluaga      : 1931      MRN: [id number]  Encounter Provider: Gloria Hall MD  Encounter Date: 2025   Encounter department: [facility] PRIMARY [facility]  :  Assessment & Plan  Medicare annual wellness visit, subsequent         Tobacco use disorder         Renovascular hypertension         Mixed hyperlipidemia         CKD (chronic kidney disease) stage 4, GFR 15-29 ml/min ([facility])  Lab Results   Component Value Date    EGFR 28 2025    EGFR 29 2024    EGFR 32 2024    CREATININE 1.54 (H) 2025    CREATININE 1.52 (H) 2024    CREATININE 1.41 (H) 2024            Prediabetes           Depression Screening and Follow-up Plan: Patient was screened for depression during today's encounter. They screened negative with a PHQ-2 score of 0.      Urinary Incontinence Plan of Care: counseling topics discussed: practice Kegel (pelvic floor strengthening) exercises, use restroom every 2 hours, limit alcohol, caffeine, spicy foods, and acidic foods and preventing constipation.     Tobacco Cessation Counseling: Tobacco cessation counseling was provided. The patient is sincerely urged to quit consumption of tobacco. She is not ready to quit tobacco. Patient refused medication.       Preventive health issues were discussed with patient, and age appropriate screening tests were ordered as noted in patient's After Visit Summary. Personalized health advice and appropriate referrals for health education or preventive services given if needed, as noted in patient's After Visit Summary.      History of Present Illness     89yo female with history of pre-diabetes, HTN, CKD, tobacco abuse here for AWV. Did not get labs before this visit. Otherwise doing well. No concerns today. Still smoking about 6 cigarettes per day.        Patient Care Team:  Gloria Hall MD as PCP - General (Internal Medicine)    Review of Systems   Constitutional:  Negative for appetite change,  chills, fatigue and fever.   Respiratory:  Negative for chest tightness and shortness of breath.    Cardiovascular:  Negative for chest pain, palpitations and leg swelling.   Gastrointestinal:  Negative for abdominal pain, blood in stool, constipation, diarrhea, nausea and vomiting.   Genitourinary:  Negative for difficulty urinating.   Neurological:  Negative for dizziness, light-headedness and headaches.   Psychiatric/Behavioral:  Negative for dysphoric mood and sleep disturbance. The patient is not nervous/anxious.      Medical History Reviewed by provider this encounter:  [facility]       Annual Wellness Visit Questionnaire   Dionne is here for her Subsequent Wellness visit.     Health Risk Assessment:   Patient rates overall health as very good. Patient feels that their physical health rating is same. Patient is very satisfied with their life. Eyesight was rated as same. Hearing was rated as same. Patient feels that their emotional and mental health rating is same. Patients states they are never, rarely angry. Patient states they are sometimes unusually tired/fatigued. Pain experienced in the last 7 days has been none. Patient states that she has experienced no weight loss or gain in last 6 months.     Depression Screening:   PHQ-2 Score: 0      Fall Risk Screening:   In the past year, patient has experienced: no history of falling in past year      Urinary Incontinence Screening:   Patient has leaked urine accidently in the last six months.     Home Safety:  Patient does not have trouble with stairs inside or outside of their home. Patient has working smoke alarms and has working carbon monoxide detector. Home safety hazards include: loose rugs on the floor.     Nutrition:   Current diet is Regular.     Medications:   Patient is currently taking over-the-counter supplements. OTC medications include: see medication list. Patient is able to manage medications.     Activities of Daily Living (ADLs)/Instrumental  Activities of Daily Living (IADLs):   Walk and transfer into and out of bed and chair?: Yes  Dress and groom yourself?: Yes    Bathe or shower yourself?: Yes    Feed yourself? Yes  Do your laundry/housekeeping?: Yes  Manage your money, pay your bills and track your expenses?: Yes  Make your own meals?: Yes    Do your own shopping?: Yes    Previous Hospitalizations:   Any hospitalizations or ED visits within the last 12 months?: No      Advance Care Planning:   Living will: Yes    Durable POA for healthcare: No    Advanced directive: Yes    Advanced directive counseling given: Yes    Five wishes given: No    Patient declined ACP directive: No    End of Life Decisions reviewed with patient: Yes    Provider agrees with end of life decisions: Yes      Comments: Daughters would make decisions about medical care if pt incapacitated     Cognitive Screening:   Provider or family/friend/caregiver concerned regarding cognition?: No    Preventive Screenings      Cardiovascular Screening:    General: Screening Not Indicated and History Lipid Disorder      Diabetes Screening:     General: Screening Current      Colorectal Cancer Screening:     General: Screening Not Indicated      Breast Cancer Screening:     General: Screening Not Indicated      Cervical Cancer Screening:    General: Screening Not Indicated      Osteoporosis Screening:    General: Screening Not Indicated      Abdominal Aortic Aneurysm (AAA) Screening:        General: Screening Not Indicated and Screening Current      Lung Cancer Screening:     General: Screening Not Indicated      Hepatitis C Screening:    General: Screening Not Indicated    Immunizations:  - Immunizations due: Influenza    Screening, Brief Intervention, and Referral to Treatment (SBIRT)     Screening  Typical number of drinks in a day: 0  Typical number of drinks in a week: 0  Interpretation: Low risk drinking behavior.    Single Item Drug Screening:  How often have you used an illegal drug  "(including marijuana) or a prescription medication for non-medical reasons in the past year? never    Single Item Drug Screen Score: 0  Interpretation: Negative screen for possible drug use disorder    Brief Intervention  Alcohol & drug use screenings were reviewed. No concerns regarding substance use disorder identified.     Other Counseling Topics:   Car/seat belt/driving safety, skin self-exam, sunscreen and calcium and vitamin D intake and regular weightbearing exercise.     Social Drivers of Health     Food Insecurity: No Food Insecurity (1/5/2024)    Nursing - Inadequate Food Risk Classification    • Worried About Running Out of Food in the Last Year: Never true    • Ran Out of Food in the Last Year: Never true   Transportation Needs: No Transportation Needs (2/1/2024)    [place]ASIS : Transportation    • Lack of Transportation (Medical): No    • Lack of Transportation (Non-Medical): No    • Patient Unable or Declines to Respond: No   Housing Stability: Low Risk  (1/5/2024)    Housing Stability Vital Sign    • Unable to Pay for Housing in the Last Year: No    • Number of Times Moved in the Last Year: 1    • Homeless in the Last Year: No   Utilities: Not At Risk (1/5/2024)    [facility] Utilities    • Threatened with loss of utilities: No     No results found.    Objective   /82   Pulse (!) 52   Temp 97.7 °F (36.5 °C) (Temporal)   Resp 16   Ht 5' 3\" (1.6 m)   Wt 85.2 kg (187 lb 12.8 oz)   SpO2 97%   BMI 33.27 kg/m²     Physical Exam  Vitals reviewed.   Constitutional:       General: She is not in acute distress.     Appearance: She is obese.   Cardiovascular:      Rate and Rhythm: Normal rate and regular rhythm.      Heart sounds: No murmur heard.     No friction rub. No gallop.   Pulmonary:      Effort: Pulmonary effort is normal. No respiratory distress.      Breath sounds: No wheezing, rhonchi or rales.   Abdominal:      General: Abdomen is flat. Bowel sounds are normal. There is no distension.      " Palpations: Abdomen is soft. There is no mass.      Tenderness: There is no abdominal tenderness. There is no guarding or rebound.   Neurological:      General: No focal deficit present.      Mental Status: She is alert.      Motor: No weakness.      Gait: Gait normal.   Psychiatric:         Mood and Affect: Mood normal.         Behavior: Behavior normal.         Thought Content: Thought content normal.         Judgment: Judgment normal.          S/P CABG (coronary artery bypass graft)

## 2025-04-16 NOTE — ASSESSMENT & PLAN NOTE
Lab Results   Component Value Date    EGFR 28 04/16/2025    EGFR 29 09/16/2024    EGFR 32 02/01/2024    CREATININE 1.54 (H) 04/16/2025    CREATININE 1.52 (H) 09/16/2024    CREATININE 1.41 (H) 02/01/2024

## 2025-04-17 LAB
25(OH)D3 SERPL-MCNC: 36.8 NG/ML (ref 30–100)
ALBUMIN SERPL BCG-MCNC: 4 G/DL (ref 3.5–5)
ALP SERPL-CCNC: 75 U/L (ref 34–104)
ALT SERPL W P-5'-P-CCNC: 17 U/L (ref 7–52)
ANION GAP SERPL CALCULATED.3IONS-SCNC: 10 MMOL/L (ref 4–13)
AST SERPL W P-5'-P-CCNC: 19 U/L (ref 13–39)
BACTERIA UR QL AUTO: ABNORMAL /HPF
BASOPHILS # BLD AUTO: 0.06 THOUSANDS/ÂΜL (ref 0–0.1)
BASOPHILS NFR BLD AUTO: 1 % (ref 0–1)
BILIRUB SERPL-MCNC: 0.31 MG/DL (ref 0.2–1)
BILIRUB UR QL STRIP: NEGATIVE
BUN SERPL-MCNC: 38 MG/DL (ref 5–25)
CALCIUM SERPL-MCNC: 9.4 MG/DL (ref 8.4–10.2)
CHLORIDE SERPL-SCNC: 103 MMOL/L (ref 96–108)
CLARITY UR: CLEAR
CO2 SERPL-SCNC: 29 MMOL/L (ref 21–32)
COLOR UR: YELLOW
CREAT SERPL-MCNC: 1.54 MG/DL (ref 0.6–1.3)
CREAT UR-MCNC: 140.6 MG/DL
EOSINOPHIL # BLD AUTO: 0.27 THOUSAND/ÂΜL (ref 0–0.61)
EOSINOPHIL NFR BLD AUTO: 3 % (ref 0–6)
ERYTHROCYTE [DISTWIDTH] IN BLOOD BY AUTOMATED COUNT: 15.5 % (ref 11.6–15.1)
EST. AVERAGE GLUCOSE BLD GHB EST-MCNC: 143 MG/DL
GFR SERPL CREATININE-BSD FRML MDRD: 28 ML/MIN/1.73SQ M
GLUCOSE P FAST SERPL-MCNC: 85 MG/DL (ref 65–99)
GLUCOSE UR STRIP-MCNC: NEGATIVE MG/DL
HBA1C MFR BLD: 6.6 %
HCT VFR BLD AUTO: 46.8 % (ref 34.8–46.1)
HGB BLD-MCNC: 14.6 G/DL (ref 11.5–15.4)
HGB UR QL STRIP.AUTO: NEGATIVE
IMM GRANULOCYTES # BLD AUTO: 0.02 THOUSAND/UL (ref 0–0.2)
IMM GRANULOCYTES NFR BLD AUTO: 0 % (ref 0–2)
KETONES UR STRIP-MCNC: NEGATIVE MG/DL
LEUKOCYTE ESTERASE UR QL STRIP: ABNORMAL
LYMPHOCYTES # BLD AUTO: 2.86 THOUSANDS/ÂΜL (ref 0.6–4.47)
LYMPHOCYTES NFR BLD AUTO: 32 % (ref 14–44)
MCH RBC QN AUTO: 29.2 PG (ref 26.8–34.3)
MCHC RBC AUTO-ENTMCNC: 31.2 G/DL (ref 31.4–37.4)
MCV RBC AUTO: 94 FL (ref 82–98)
MICROALBUMIN UR-MCNC: 14.8 MG/L
MICROALBUMIN/CREAT 24H UR: 11 MG/G CREATININE (ref 0–30)
MONOCYTES # BLD AUTO: 0.92 THOUSAND/ÂΜL (ref 0.17–1.22)
MONOCYTES NFR BLD AUTO: 10 % (ref 4–12)
NEUTROPHILS # BLD AUTO: 4.89 THOUSANDS/ÂΜL (ref 1.85–7.62)
NEUTS SEG NFR BLD AUTO: 54 % (ref 43–75)
NITRITE UR QL STRIP: NEGATIVE
NON-SQ EPI CELLS URNS QL MICRO: ABNORMAL /HPF
NRBC BLD AUTO-RTO: 0 /100 WBCS
PH UR STRIP.AUTO: 5.5 [PH]
PHOSPHATE SERPL-MCNC: 3.2 MG/DL (ref 2.3–4.1)
PLATELET # BLD AUTO: 210 THOUSANDS/UL (ref 149–390)
PMV BLD AUTO: 10.7 FL (ref 8.9–12.7)
POTASSIUM SERPL-SCNC: 4.1 MMOL/L (ref 3.5–5.3)
PROT SERPL-MCNC: 6.7 G/DL (ref 6.4–8.4)
PROT UR STRIP-MCNC: ABNORMAL MG/DL
PTH-INTACT SERPL-MCNC: 30 PG/ML (ref 12–88)
RBC # BLD AUTO: 5 MILLION/UL (ref 3.81–5.12)
RBC #/AREA URNS AUTO: ABNORMAL /HPF
SODIUM SERPL-SCNC: 142 MMOL/L (ref 135–147)
SP GR UR STRIP.AUTO: 1.02 (ref 1–1.03)
TRANS CELLS #/AREA URNS HPF: PRESENT /[HPF]
UROBILINOGEN UR STRIP-ACNC: <2 MG/DL
WBC # BLD AUTO: 9.02 THOUSAND/UL (ref 4.31–10.16)
WBC #/AREA URNS AUTO: ABNORMAL /HPF

## 2025-04-21 ENCOUNTER — RESULTS FOLLOW-UP (OUTPATIENT)
Dept: FAMILY MEDICINE CLINIC | Facility: CLINIC | Age: OVER 89
End: 2025-04-21

## 2025-04-21 DIAGNOSIS — N18.4 CKD (CHRONIC KIDNEY DISEASE) STAGE 4, GFR 15-29 ML/MIN (HCC): Primary | ICD-10-CM

## 2025-04-21 DIAGNOSIS — R73.03 PREDIABETES: ICD-10-CM

## 2025-04-21 DIAGNOSIS — I15.0 RENOVASCULAR HYPERTENSION: ICD-10-CM

## 2025-04-21 NOTE — TELEPHONE ENCOUNTER
Patient returned call.    Advised of Dr. Hall's message regarding the lab results.     Patient acknowledged agreement

## 2025-07-24 DIAGNOSIS — N18.32 CKD STAGE G3B/A1, GFR 30-44 AND ALBUMIN CREATININE RATIO <30 MG/G (HCC): ICD-10-CM

## 2025-07-24 DIAGNOSIS — I16.1 HYPERTENSIVE EMERGENCY: ICD-10-CM

## 2025-07-24 DIAGNOSIS — E78.2 MIXED HYPERLIPIDEMIA: ICD-10-CM

## 2025-07-24 RX ORDER — ROSUVASTATIN CALCIUM 10 MG/1
10 TABLET, COATED ORAL DAILY
Qty: 100 TABLET | Refills: 1 | Status: SHIPPED | OUTPATIENT
Start: 2025-07-24

## 2025-07-24 RX ORDER — LISINOPRIL 20 MG/1
20 TABLET ORAL DAILY
Qty: 100 TABLET | Refills: 1 | Status: SHIPPED | OUTPATIENT
Start: 2025-07-24

## 2025-07-24 NOTE — TELEPHONE ENCOUNTER
Medication: lisinopril (ZESTRIL) 20 mg tablet     Dose/Frequency: Take 1 tablet (20 mg total) by mouth daily     Quantity: 100 tablet     Pharmacy: [facility] [address] [place] [address] [address] [phone]     Office:   [x] PCP/Provider - Gloria Hall MD   [] Speciality/Provider -     Does the patient have enough for 3 days?   [] Yes   [x] No - Send as HP to POD    Medication:     rosuvastatin (CRESTOR) 10 MG tablet       Dose/Frequency:  Take 1 tablet (10 mg total) by mouth daily,     Quantity: 100 tablet     [facility] [address] [place] [address] - [address] [phone]   Office:   [x] PCP/Provider - Gloria Hall MD   [] Speciality/Provider -     Does the patient have enough for 3 days?   [] Yes   [x] No - Send as HP to POD